# Patient Record
Sex: MALE | Race: WHITE | Employment: OTHER | URBAN - METROPOLITAN AREA
[De-identification: names, ages, dates, MRNs, and addresses within clinical notes are randomized per-mention and may not be internally consistent; named-entity substitution may affect disease eponyms.]

---

## 2017-01-02 ENCOUNTER — TELEPHONE (OUTPATIENT)
Dept: FAMILY MEDICINE | Facility: CLINIC | Age: 57
End: 2017-01-02

## 2017-01-03 ENCOUNTER — TELEPHONE (OUTPATIENT)
Dept: SURGERY | Facility: CLINIC | Age: 57
End: 2017-01-03

## 2017-01-03 NOTE — TELEPHONE ENCOUNTER
GENERAL SURGERY NURSE PHONE TRIAGE     Yogi Moreland      MRN# [id number]  AGE:  56 year old     YOB: 1960  [phone]     Surgeon: Dr. Masters      Surgery type: Laparoscopic-Assisted Colostomy Takedown, repair of parastomal hernia       Surgery Date: December / 27 / 2016     POD: 7     CHIEF CONCERN:  Constipation and incision     HISTORY OF PRESENT ILLNESS:      CONSTIPATION  Last bowel movement: 1 day ago.  Stool consistency: soft formed-   Patient feels his output is far less than input.  Accompanying Signs & Symptoms:  Abdominal pain (cramping?): no   Blood in stool: no   Rectal pain: no   Nausea/vomiting: no   Weight loss or gain: no  History:   History of abdominal surgery: YES    INCISION  Patient reports superior incision- where stoma stump was, is experiencing new drainage that is dark red. Slightly pink area where drainage has been.  Onset- 2 days ago.  Patient reports, area scabs over at times. Staples are present yet loose (per pt)  PLAN:   Take Miralax  clinic appointment with provider tomorrow 1/4/17 at 10:30 am to assess incision.   Pt is in agreement with this plan.  Joanne Lai RN

## 2017-01-03 NOTE — TELEPHONE ENCOUNTER
"Hospital/TCU/ED for chronic condition Discharge Protocol    \"Hi, my name is Efe Coto, a registered nurse, and I am calling from [facility].  I am calling to follow up and see how things are going for you after your recent /hospital stay.\"    Tell me how you are doing now that you are home?\" Pretty good. I had a little seepage from incision site, the site of colostomy/stoma takedown. Called surgeon who recommended follow up visit tomorrow 1/4/17 for incision check.  And is also seeing Dr. Masters again Monday Jan 9 for staple removal      Discharge Instructions    \"Let's review your discharge instructions.  What is/are the follow-up recommendations?  Pt. Response: see above    \"Has an appointment with your primary care provider been scheduled?\"   No. Declined at this time.     \"When you see the provider, I would recommend that you bring your medications with you.\"    Medications    \"Tell me what changed about your medicines when you discharged?\"    Changes to chronic meds?    0-1    \"What questions do you have about your medications?\"    None     New diagnoses of heart failure, COPD, diabetes, or MI?    No              Medication reconciliation completed? Yes  Was MTM referral placed (*Make sure to put transitions as reason for referral)?   No    Call Summary    \"What questions or concerns do you have about your recent visit and your follow-up care?\"     none    \"If you have questions or things don't continue to improve, we encourage you contact us.  Even if the clinic is not open, triage nurses are available 24/7 to help you.     \"Thank you for your time and take care!\"             "

## 2017-01-04 ENCOUNTER — OFFICE VISIT (OUTPATIENT)
Dept: SURGERY | Facility: CLINIC | Age: 57
End: 2017-01-04
Payer: COMMERCIAL

## 2017-01-04 VITALS
WEIGHT: 165 LBS | SYSTOLIC BLOOD PRESSURE: 110 MMHG | BODY MASS INDEX: 22.35 KG/M2 | HEART RATE: 77 BPM | OXYGEN SATURATION: 100 % | HEIGHT: 72 IN | DIASTOLIC BLOOD PRESSURE: 74 MMHG | TEMPERATURE: 98 F

## 2017-01-04 DIAGNOSIS — Z09 SURGICAL FOLLOWUP VISIT: Primary | ICD-10-CM

## 2017-01-04 PROCEDURE — 99024 POSTOP FOLLOW-UP VISIT: CPT | Performed by: PHYSICIAN ASSISTANT

## 2017-01-04 NOTE — PROGRESS NOTES
1/4/2017  [facility] Clinic Note     Subjective:  Yogi Moreland is here for an incision check. He underwent a laparoscopic-assisted colostomy takedown and repair of parastomal hernia by Dr. Masters on 12/26/2016. Today he  tells me he has been feeling well since surgery although on Monday he feels he may have overdone it with activity as he reports he attempted to hang some shelves at home and later that day he noticed some serosanginous drainage from his old stoma site. He denies increased pain or signs/symptoms of infections and is afebrile. He also reports he was feeling a bit constipated and began to take miralax, which has been successful. He currently does not require narcotic pain medications and is managing his pain with Tylenol and ibuprofen alone.     Objective:  Incisions - Healing well, well approximated, without signs of infection and no current drainage. Staples intact.  Old stoma site with small scab between staples, without drainage or signs of infection.     Assessment:  S/p laparoscopic-assisted colostomy takedown and repair of parastomal hernia by Dr. Masters on 12/26/2016. .   Likely small seroma began draining from stoma site.       Plan:  RTC next week as scheduled for postoperative visit and staple removal, or sooner if needed. Patient is in agreement with this plan.       Melba Crawford PA-C      Please route or send letter to:  *None*

## 2017-01-09 ENCOUNTER — OFFICE VISIT (OUTPATIENT)
Dept: SURGERY | Facility: CLINIC | Age: 57
End: 2017-01-09
Payer: COMMERCIAL

## 2017-01-09 VITALS
HEART RATE: 94 BPM | OXYGEN SATURATION: 100 % | TEMPERATURE: 98 F | DIASTOLIC BLOOD PRESSURE: 64 MMHG | BODY MASS INDEX: 22.62 KG/M2 | WEIGHT: 167 LBS | SYSTOLIC BLOOD PRESSURE: 122 MMHG | HEIGHT: 72 IN

## 2017-01-09 DIAGNOSIS — Z09 SURGICAL FOLLOWUP VISIT: Primary | ICD-10-CM

## 2017-01-09 PROCEDURE — 99024 POSTOP FOLLOW-UP VISIT: CPT | Performed by: SURGERY

## 2017-01-09 NOTE — PROGRESS NOTES
The patient returns to follow-up after his colostomy takedown and repair of parastomal hernia.  He is doing very well.  He did have some drainage from his incision, but this has stopped.  He presents today to have his staples removed.    The patient's incisions are healing well. There is a small scab in the midportion of his stoma site incision.  The staples are removed and Steri-Strips are placed.    Overall, the patient is doing well.  He may return to see me on an as-needed basis.  He should definitely return if he has any further significant drainage from his incision.      Ramón Masters MD  [facility]    Please route or send letter to:  Primary Care Provider (PCP)      HPI      ROS      Physical Exam

## 2017-01-09 NOTE — Clinical Note
2017      RE:  Yogi ORDOÑEZ Aniceto-: 56    The patient returns to follow-up after his colostomy takedown and repair of parastomal hernia.  He is doing very well.  He did have some drainage from his incision, but this has stopped.  He presents today to have his staples removed.    The patient's incisions are healing well. There is a small scab in the midportion of his stoma site incision.  The staples are removed and Steri-Strips are placed.    Overall, the patient is doing well.  He may return to see me on an as-needed basis.  He should definitely return if he has any further significant drainage from his incision.      Ramón Masters MD  [facility]

## 2017-01-23 DIAGNOSIS — R97.20 ELEVATED PROSTATE SPECIFIC ANTIGEN (PSA): ICD-10-CM

## 2017-01-23 PROCEDURE — 36415 COLL VENOUS BLD VENIPUNCTURE: CPT | Performed by: FAMILY MEDICINE

## 2017-01-23 PROCEDURE — 84153 ASSAY OF PSA TOTAL: CPT | Performed by: FAMILY MEDICINE

## 2017-01-24 LAB — PSA SERPL-MCNC: 7.61 UG/L (ref 0–4)

## 2017-01-26 ENCOUNTER — OFFICE VISIT (OUTPATIENT)
Dept: UROLOGY | Facility: CLINIC | Age: 57
End: 2017-01-26
Payer: COMMERCIAL

## 2017-01-26 ENCOUNTER — OFFICE VISIT (OUTPATIENT)
Dept: SURGERY | Facility: CLINIC | Age: 57
End: 2017-01-26
Payer: COMMERCIAL

## 2017-01-26 VITALS
WEIGHT: 167 LBS | HEIGHT: 72 IN | SYSTOLIC BLOOD PRESSURE: 120 MMHG | HEART RATE: 78 BPM | DIASTOLIC BLOOD PRESSURE: 60 MMHG | BODY MASS INDEX: 22.62 KG/M2

## 2017-01-26 DIAGNOSIS — R97.20 ELEVATED PROSTATE SPECIFIC ANTIGEN (PSA): Primary | ICD-10-CM

## 2017-01-26 DIAGNOSIS — Z09 SURGICAL FOLLOWUP VISIT: Primary | ICD-10-CM

## 2017-01-26 PROCEDURE — 99212 OFFICE O/P EST SF 10 MIN: CPT | Performed by: UROLOGY

## 2017-01-26 PROCEDURE — 99207 ZZC NO CHARGE NURSE ONLY: CPT | Performed by: SURGERY

## 2017-01-26 RX ORDER — CIPROFLOXACIN 500 MG/1
500 TABLET, FILM COATED ORAL EVERY 12 HOURS
Qty: 6 TABLET | Refills: 0 | Status: SHIPPED | OUTPATIENT
Start: 2017-01-26 | End: 2017-03-07

## 2017-01-26 NOTE — PROGRESS NOTES
Yogi is a 56-year-old male with an elevated PSA.  He recently underwent colostomy takedown.  His repeat PSA preop was 7.61.  He has no first-degree relative with prostate cancer.  He had a normal digital rectal exam 5 weeks ago.  Other past medical history:inflammatory diverticular disease, hyperlipidemia, nonsmoker  Medications:oxycodone  Allergies: None  Review of systems: Healing well from surgery a month ago.  Has some blood in stool and will check Dr. Masters  Assessment: Elevated PSA  Plan: Transrectal ultrasound of prostate with likely transrectal biopsies of the prostate  In 3 weeks.  We will prophylax with Cipro 500 mg plus  Gentamicin 80 mg IM because he has taken quinolones recently-discussed 20% risk of quinolone resistant Escherichia coli, other risks, follow-up

## 2017-01-26 NOTE — MR AVS SNAPSHOT
After Visit Summary   1/26/2017    Yogi Moreland    MRN: [id number]           Patient Information     Date Of Birth          1960        Visit Information        Provider Department      1/26/2017 10:30 AM Chuy Reddy MD [facility] [place]        Today's Diagnoses     Elevated prostate specific antigen (PSA)    -  1       Care Instructions    Ultrasound Guided Prostate Biopsy      What is a prostate biopsy?  A prostate biopsy is a relatively painless procedure performed in the physician's office, outpatient facility or hospital.  A long, thin needle is inserted into the prostate to collect a sample of tissue from the prostate.  These samples are then examined by a pathologist for abnormal cells.    Why do I need a prostate biopsy? During a man's lifetime the prostate gradually increases in size.  The patient may or may not experience symptoms.  Symptoms of an enlarge prostate include:    Increased frequency of urination    Decreased force of urinary stream    Trouble with urination    Awakening at night to urinate    When the prostate is examined, the physician may feel a nodule (hard or firm growth) which would require a biopsy.    Another reason for having a prostate biopsy is an increase in the prostate specific androgen (PSA) in your blood.  A prostate biopsy may be necessary to determine the cause of the increased PSA.    Your physician will also perform an ultrasound (an image created by sound waves).  The ultrasound is performed by placing a small probe in the rectum to image the prosate gland.    Preparation for the Prostate Biopsy    1.  During the week before your prostate biopsy substances that may thin your blood (ASPIRIN, BABY ASPIRIN, ACETYLSALICYLIC ACID, ADVIL/MOTRIN, IBUPROFEN, ALEVE, COUMADIN (WARFARIN), PRADAXA, ELIQUIS, PLAVIX, XARELTO, VITAMIN E, FISH OIL) must be discontinued.  If you are in doubt, please call.  This is a very  important requirement and your procedure may be cancelled if you have not stopped taking all of these medications.    2.  If you have any bleeding problems (thin blood), please tell your doctor.    3.  If you have been told by another doctor to take antibiotics before dental work or surgery, please tell the doctor.  This is common for patients that have had a joint replacement.    YOU HAVE BEEN PRESCRIBED AN ANTIBIOTIC.  Please follow the directions written on the bottle.  The directions usually will tell you to start the antibiotic the day before your biopsy.  You will continue taking the medication until it is gone.    The day of the biopsy you will be asked to use an enema one hour before you leave for your appointment.    Unless you have been prescribed a sedative (Valium or a similar drug) you will be able to drive to and from your appointment.  If you have taken a sedative you must have a ride.    AFTER THE BIOPSY    DANGER SIGNS    Small amount of blood in the urine for 10-14 days Excessive blood in the urine, stool or ejaculate  Small amount of blood in the stool for 48 hours Fever over 100 or chills  Small amount of blood in the ejaculate for up to Frequent urination or burning when you urinate   4 weeks          Your biopsy is scheduled on____________________________ at our [place] office.  Please be at     The office at ____________________.  A follow up visit has been scheduled for you on     _______________________________ at the  ________________________________.    Your doctor will discuss your results with you at this visit.    CALL THE DOCTOR'S OFFICE -[phone] IF YOU HAVE ANY OF THESE SYMPTOMS.  IF YOU CANNOT CONTACT THE OFFICE, GO TO THE EMERGENCY ROOM.    _             Follow-ups after your visit        Your next 10 appointments already scheduled     Feb 17, 2017  2:00 PM   Sonography/Biopsy with Chuy Reddy MD,  BX ROOM   [facility] [place] ([facility] [place])    [address]  [address]  [facility] [id number] [phone]            Mar 07, 2017 10:10 AM   Return Visit with Chuy Reddy MD   [facility] [place] ([facility] [place])    [address]  [address]  [place] [address] [phone]              Who to contact     If you have questions or need follow up information about today's clinic visit or your schedule please contact [facility] [place] directly at [phone].  Normal or non-critical lab and imaging results will be communicated to you by [employer], letter or phone within 4 business days after the clinic has received the results. If you do not hear from us within 7 days, please contact the clinic through [employer] or phone. If you have a critical or abnormal lab result, we will notify you by phone as soon as possible.  Submit refill requests through [employer] or call your pharmacy and they will forward the refill request to us. Please allow 3 business days for your refill to be completed.          Additional Information About Your Visit        MyChart Information     [employer] gives you secure access to your electronic health record. If you see a primary care provider, you can also send messages to your care team and make appointments. If you have questions, please call your primary care clinic.  If you do not have a primary care provider, please call [phone] and they will assist you.        Care EveryWhere ID     This is your Care EveryWhere ID. This could be used by other organizations to access your [place] medical records  [id number]        Your Vitals Were     Pulse Height BMI (Body Mass Index)             78 1.829 m (6') 22.64 kg/m2          Blood Pressure from Last 3 Encounters:   01/26/17 120/60   01/09/17 122/64   01/04/17 110/74    Weight from Last 3 Encounters:   01/26/17 75.751 kg (167 lb)   01/09/17 75.751 kg (167 lb)    01/04/17 74.844 kg (165 lb)              Today, you had the following     No orders found for display         Today's Medication Changes          These changes are accurate as of: 1/26/17 10:41 AM.  If you have any questions, ask your nurse or doctor.               Start taking these medicines.        Dose/Directions    ciprofloxacin 500 MG tablet   Commonly known as:  CIPRO   Used for:  Elevated prostate specific antigen (PSA)   Started by:  Chuy Reddy MD        Dose:  500 mg   Take 1 tablet (500 mg) by mouth every 12 hours   Quantity:  6 tablet   Refills:  0            Where to get your medicines      These medications were sent to [place] [address] [place] [facility] [address]  [address] [facility] [address]     Phone:  [phone]    - ciprofloxacin 500 MG tablet             Primary Care Provider Office Phone # Fax #    Randolph Solomon Mueller [phone]       [facility] [address] [facility] [address]        Thank you!     Thank you for choosing [facility] [place]  for your care. Our goal is always to provide you with excellent care. Hearing back from our patients is one way we can continue to improve our services. Please take a few minutes to complete the written survey that you may receive in the mail after your visit with us. Thank you!             Your Updated Medication List - Protect others around you: Learn how to safely use, store and throw away your medicines at www.disposemymeds.org.          This list is accurate as of: 1/26/17 10:41 AM.  Always use your most recent med list.                   Brand Name Dispense Instructions for use    ciprofloxacin 500 MG tablet    CIPRO    6 tablet    Take 1 tablet (500 mg) by mouth every 12 hours       oxyCODONE 5 MG IR tablet    ROXICODONE    50 tablet    Take 1-2 tablets (5-10 mg) by mouth every 4 hours as needed for moderate to severe pain

## 2017-01-26 NOTE — PATIENT INSTRUCTIONS
Ultrasound Guided Prostate Biopsy      What is a prostate biopsy?  A prostate biopsy is a relatively painless procedure performed in the physician's office, outpatient facility or hospital.  A long, thin needle is inserted into the prostate to collect a sample of tissue from the prostate.  These samples are then examined by a pathologist for abnormal cells.    Why do I need a prostate biopsy? During a man's lifetime the prostate gradually increases in size.  The patient may or may not experience symptoms.  Symptoms of an enlarge prostate include:    Increased frequency of urination    Decreased force of urinary stream    Trouble with urination    Awakening at night to urinate    When the prostate is examined, the physician may feel a nodule (hard or firm growth) which would require a biopsy.    Another reason for having a prostate biopsy is an increase in the prostate specific androgen (PSA) in your blood.  A prostate biopsy may be necessary to determine the cause of the increased PSA.    Your physician will also perform an ultrasound (an image created by sound waves).  The ultrasound is performed by placing a small probe in the rectum to image the prosate gland.    Preparation for the Prostate Biopsy    1.  During the week before your prostate biopsy substances that may thin your blood (ASPIRIN, BABY ASPIRIN, ACETYLSALICYLIC ACID, ADVIL/MOTRIN, IBUPROFEN, ALEVE, COUMADIN (WARFARIN), PRADAXA, ELIQUIS, PLAVIX, XARELTO, VITAMIN E, FISH OIL) must be discontinued.  If you are in doubt, please call.  This is a very important requirement and your procedure may be cancelled if you have not stopped taking all of these medications.    2.  If you have any bleeding problems (thin blood), please tell your doctor.    3.  If you have been told by another doctor to take antibiotics before dental work or surgery, please tell the doctor.  This is common for patients that have had a joint replacement.    YOU HAVE BEEN PRESCRIBED AN  ANTIBIOTIC.  Please follow the directions written on the bottle.  The directions usually will tell you to start the antibiotic the day before your biopsy.  You will continue taking the medication until it is gone.    The day of the biopsy you will be asked to use an enema one hour before you leave for your appointment.    Unless you have been prescribed a sedative (Valium or a similar drug) you will be able to drive to and from your appointment.  If you have taken a sedative you must have a ride.    AFTER THE BIOPSY    DANGER SIGNS    Small amount of blood in the urine for 10-14 days Excessive blood in the urine, stool or ejaculate  Small amount of blood in the stool for 48 hours Fever over 100 or chills  Small amount of blood in the ejaculate for up to Frequent urination or burning when you urinate   4 weeks          Your biopsy is scheduled on____________________________ at our [place] office.  Please be at     The office at ____________________.  A follow up visit has been scheduled for you on     _______________________________ at the  ________________________________.    Your doctor will discuss your results with you at this visit.    CALL THE DOCTOR'S OFFICE -[phone] IF YOU HAVE ANY OF THESE SYMPTOMS.  IF YOU CANNOT CONTACT THE OFFICE, GO TO THE EMERGENCY ROOM.    _

## 2017-01-26 NOTE — NURSING NOTE
Unable to find Gentamicin 80mg vial of medicine on Epic so RX was cllled into his pharmacy. Yogi was instructed to  the vial of medicine and and bring to his appointment for the biopsy and nurse at office will give 30 minutes prior to biopsy appointment per Dr Reddy. Suri Santiago LPN

## 2017-01-26 NOTE — Clinical Note
1/26/2017       RE: Yogi Moreland  [address] [facility] [address]     Dear Colleague,    Thank you for referring your patient, Yogi Moreland, to the [facility] [place] [facility]. Please see a copy of my visit note below.    Yogi is a 56-year-old male with an elevated PSA.  He recently underwent colostomy takedown.  His repeat PSA preop was 7.61.  He has no first-degree relative with prostate cancer.  He had a normal digital rectal exam 5 weeks ago.  Other past medical history:inflammatory diverticular disease, hyperlipidemia, nonsmoker  Medications:oxycodone  Allergies: None  Review of systems: Healing well from surgery a month ago.  Has some blood in stool and will check Dr. Masters  Assessment: Elevated PSA  Plan: Transrectal ultrasound of prostate with likely transrectal biopsies of the prostate  In 3 weeks.  We will prophylax with Cipro 500 mg plus  Gentamicin 80 mg IM because he has taken quinolones recently-discussed 20% risk of quinolone resistant Escherichia coli, other risks, follow-up    Again, thank you for allowing me to participate in the care of your patient.      Sincerely,    Chuy Reddy MD

## 2017-01-26 NOTE — PROGRESS NOTES
GENERAL SURGERY NURSE PHONE TRIAGE     Yogi Moreland      MRN# [id number]  AGE:  56 year old     YOB: 1960  [phone] (home) [phone] (work) [place]     Surgeon: Dr. Masters      Surgery type: Laparoscopic-Assisted Colostomy Takedown, repair of parastomal hernia         Surgery Date: December / 27 / 2016     POD: 30     CHIEF CONCERN:  1. Discomfort  2.  Blood in stool     HISTORY OF PRESENT ILLNESS:   Patient walked into clinic and asked to see someone, had just been downstairs at urologist  And has a procedure scheduled for 2/17/17.  Told front dest he is having slight discomfort with B.Ms and blood in stools for the past 3-4 days.    Was in a nurse visit, patient approached desk stating he could not wait any longer.  Reports bleeding is bright red, small line on side of stool.      Appointment scheduled with Dr. Masters for tomorrow morning.  Will inform clinic PA-C (who was in an appointment when pt walked in)  Will also alert Dr. Masters.    PLAN:   Appointment scheduled with Dr. Masters for tomorrow morning.  Will inform clinic PA-C (who was in an appointment when pt walked in)  Will also alert Dr. Masters.   Pt is in agreement with this plan.

## 2017-01-27 ENCOUNTER — OFFICE VISIT (OUTPATIENT)
Dept: SURGERY | Facility: CLINIC | Age: 57
End: 2017-01-27
Payer: COMMERCIAL

## 2017-01-27 VITALS
TEMPERATURE: 97.7 F | HEART RATE: 70 BPM | SYSTOLIC BLOOD PRESSURE: 118 MMHG | OXYGEN SATURATION: 99 % | BODY MASS INDEX: 22.62 KG/M2 | HEIGHT: 72 IN | DIASTOLIC BLOOD PRESSURE: 62 MMHG | WEIGHT: 167 LBS

## 2017-01-27 DIAGNOSIS — Z09 SURGICAL FOLLOWUP VISIT: Primary | ICD-10-CM

## 2017-01-27 PROCEDURE — 99024 POSTOP FOLLOW-UP VISIT: CPT | Performed by: SURGERY

## 2017-01-27 NOTE — Clinical Note
2017      RE:  Yogi Arredondomelissa-:  56    The patient presents today with concerns about some recent blood in his stool.  He had several days of a small amount of blood on the surface of his stool.  This has now been resolved for two days.  He is due to have a prostate biopsy in about three weeks.    I explained to the patient that if he is not having active bleeding, this is likely either due to the healing staple line or possibly to hemorrhoids.  Given the fact that the bleeding has stopped, I would not recommend any intervention at this time.  If he has any continued bleeding, colonoscopy sometime after the six week postop.  May be appropriate.  I think it should be fine for him to go ahead with his prostate biopsy.  He will let me know if he has any further bleeding.    Ramón Masters MD  [facility]

## 2017-01-27 NOTE — PROGRESS NOTES
The patient presents today with concerns about some recent blood in his stool.  He had several days of a small amount of blood on the surface of his stool.  This has now been resolved for two days.  He is due to have a prostate biopsy in about three weeks.    I explained to the patient that if he is not having active bleeding, this is likely either due to the healing staple line or possibly to hemorrhoids.  Given the fact that the bleeding has stopped, I would not recommend any intervention at this time.  If he has any continued bleeding, colonoscopy sometime after the six week postop.  May be appropriate.  I think it should be fine for him to go ahead with his prostate biopsy.  He will let me know if he has any further bleeding.    Ramón Masters MD  [facility]    Please route or send letter to:  Primary Care Provider (PCP)      HPI      ROS      Physical Exam

## 2017-02-17 ENCOUNTER — OFFICE VISIT (OUTPATIENT)
Dept: UROLOGY | Facility: CLINIC | Age: 57
End: 2017-02-17
Payer: COMMERCIAL

## 2017-02-17 VITALS
WEIGHT: 167 LBS | HEIGHT: 72 IN | BODY MASS INDEX: 22.62 KG/M2 | SYSTOLIC BLOOD PRESSURE: 124 MMHG | DIASTOLIC BLOOD PRESSURE: 70 MMHG | HEART RATE: 80 BPM

## 2017-02-17 DIAGNOSIS — R97.20 ELEVATED PROSTATE SPECIFIC ANTIGEN (PSA): Primary | ICD-10-CM

## 2017-02-17 PROCEDURE — 76872 US TRANSRECTAL: CPT | Performed by: UROLOGY

## 2017-02-17 PROCEDURE — 55700 ZZHC BIOPSY PROSTATE NEEDLE/PUNCH: CPT | Performed by: UROLOGY

## 2017-02-17 PROCEDURE — 88342 IMHCHEM/IMCYTCHM 1ST ANTB: CPT | Mod: 59 | Performed by: UROLOGY

## 2017-02-17 PROCEDURE — 88305 TISSUE EXAM BY PATHOLOGIST: CPT | Mod: 59 | Performed by: UROLOGY

## 2017-02-17 PROCEDURE — 88344 IMHCHEM/IMCYTCHM EA MLT ANTB: CPT | Performed by: UROLOGY

## 2017-02-17 RX ORDER — GENTAMICIN 40 MG/ML
INJECTION, SOLUTION INTRAMUSCULAR; INTRAVENOUS
COMMUNITY
Start: 2017-01-26 | End: 2017-03-07

## 2017-02-17 ASSESSMENT — PAIN SCALES - GENERAL: PAINLEVEL: NO PAIN (0)

## 2017-02-17 NOTE — PROGRESS NOTES
Diagnosis: PSA 7.61  Procedure: ALCIDES, transrectal ultrasound of prostate, transrectal biopsies of prostate  Anesthesia: Local  EBL: 5 cc  Complications: None. Patient tolerated procedure well  Findings: Prostate is 62 cc in size, left transition zone is much larger than right transition zone. No hypoechoic areas seen in the peripheral zone. Total of 15 biopsies taken with 3 from the left transition zone  Prophylaxis: Cipro 500 mg ×6 doses plus gentamicin IM

## 2017-02-17 NOTE — PATIENT INSTRUCTIONS
[facility]  Transrectal Ultrasound  Post Operative Information    The physician who performed your Transrectal Ultrasound is Dr. Reddy (telephone number [phone]).  Please contact this doctor if you have any problems or questions.  If unable to reach your doctor, please return to the Emergency Department.      Take one antibiotic the evening of the procedure and then as directed on your prescription.    Drink at least 6-8 glasses of fluids for the first 48 hours.    Avoid heavy lifting and strenuous activity for 48 hours.    Avoid sexual intercourse for the first 24 hours.    No aspirin or ibuprofen products (Motrin, Advil, Nuprin, ect.) for one week.  You may take acetaminophen (Tylenol) for pain.    You may notice a small amount of blood on the tissue after a bowel movement.    You may pass blood with clots in your urine following the procedure.  The amount will decrease with time but may be visible for up to two weeks.     You make have blood in your semen for 4 weeks after the procedure.    You may experience mild perineal (groin area) discomfort after the procedure.    Please call you doctor if you have any of the follow symptoms:  Fever  Increase in the amount of blood passed  Severe discomfort or pain

## 2017-02-17 NOTE — MR AVS SNAPSHOT
After Visit Summary   2/17/2017    Yogi Moreland    MRN: [id number]           Patient Information     Date Of Birth          1960        Visit Information        Provider Department      2/17/2017 2:00 PM Chuy Reddy MD; UA BX ROOM [facility] [place]        Today's Diagnoses     Elevated prostate specific antigen (PSA)    -  1      Care Instructions    [facility]  Transrectal Ultrasound  Post Operative Information    The physician who performed your Transrectal Ultrasound is Dr. Reddy (telephone number [phone]).  Please contact this doctor if you have any problems or questions.  If unable to reach your doctor, please return to the Emergency Department.      Take one antibiotic the evening of the procedure and then as directed on your prescription.    Drink at least 6-8 glasses of fluids for the first 48 hours.    Avoid heavy lifting and strenuous activity for 48 hours.    Avoid sexual intercourse for the first 24 hours.    No aspirin or ibuprofen products (Motrin, Advil, Nuprin, ect.) for one week.  You may take acetaminophen (Tylenol) for pain.    You may notice a small amount of blood on the tissue after a bowel movement.    You may pass blood with clots in your urine following the procedure.  The amount will decrease with time but may be visible for up to two weeks.     You make have blood in your semen for 4 weeks after the procedure.    You may experience mild perineal (groin area) discomfort after the procedure.    Please call you doctor if you have any of the follow symptoms:  Fever  Increase in the amount of blood passed  Severe discomfort or pain        Follow-ups after your visit        Your next 10 appointments already scheduled     Mar 07, 2017 10:10 AM CST   Return Visit with Chuy Reddy MD   [facility] [place] ([facility])    [address]  [facility] [id number] [phone]              Future tests that were ordered for you today     Open Future Orders        Priority Expected Expires Ordered    US TRANSRECTAL Routine 5/18/2017 2/17/2018 2/17/2017            Who to contact     If you have questions or need follow up information about today's clinic visit or your schedule please contact [facility] [place] directly at [phone].  Normal or non-critical lab and imaging results will be communicated to you by [employer], letter or phone within 4 business days after the clinic has received the results. If you do not hear from us within 7 days, please contact the clinic through [employer] or phone. If you have a critical or abnormal lab result, we will notify you by phone as soon as possible.  Submit refill requests through [employer] or call your pharmacy and they will forward the refill request to us. Please allow 3 business days for your refill to be completed.          Additional Information About Your Visit        [employer] Information     [employer] gives you secure access to your electronic health record. If you see a primary care provider, you can also send messages to your care team and make appointments. If you have questions, please call your primary care clinic.  If you do not have a primary care provider, please call [phone] and they will assist you.        Care EveryWhere ID     This is your Care EveryWhere ID. This could be used by other organizations to access your [place] medical records  [id number]        Your Vitals Were     Pulse Height BMI (Body Mass Index)             80 1.829 m (6') 22.65 kg/m2          Blood Pressure from Last 3 Encounters:   02/17/17 124/70   01/27/17 118/62   01/26/17 120/60    Weight from Last 3 Encounters:   02/17/17 75.8 kg (167 lb)   01/27/17 75.8 kg (167 lb)   01/26/17 75.8 kg (167 lb)              We Performed the Following     BIOPSY OF PROSTATE,NEEDLE/PUNCH [[id number]]      INJECT ASHA FELTON PERIPH NERV [[id number]]        Primary Care Provider Office Phone # Fax #    Randolph Solomon Mueller [phone]       [address] [address]        Thank you!     Thank you for choosing [facility] [place]  for your care. Our goal is always to provide you with excellent care. Hearing back from our patients is one way we can continue to improve our services. Please take a few minutes to complete the written survey that you may receive in the mail after your visit with us. Thank you!             Your Updated Medication List - Protect others around you: Learn how to safely use, store and throw away your medicines at www.disposemymeds.org.          This list is accurate as of: 2/17/17  2:53 PM.  Always use your most recent med list.                   Brand Name Dispense Instructions for use    ciprofloxacin 500 MG tablet    CIPRO    6 tablet    Take 1 tablet (500 mg) by mouth every 12 hours       gentamicin 40 MG/ML injection    GARAMYCIN         oxyCODONE 5 MG IR tablet    ROXICODONE    50 tablet    Take 1-2 tablets (5-10 mg) by mouth every 4 hours as needed for moderate to severe pain

## 2017-02-17 NOTE — LETTER
2/17/2017       RE: Yogi Moreland  [address]   [place] [place] [facility] [address]     Dear Colleague,    Thank you for referring your patient, Yogi Moreland, to the [facility]OLOG[facility] [place] at [facility]. Please see a copy of my visit note below.    Diagnosis: PSA 7.61  Procedure: ALCIDES, transrectal ultrasound of prostate, transrectal biopsies of prostate  Anesthesia: Local  EBL: 5 cc  Complications: None. Patient tolerated procedure well  Findings: Prostate is 62 cc in size, left transition zone is much larger than right transition zone. No hypoechoic areas seen in the peripheral zone. Total of 15 biopsies taken with 3 from the left transition zone  Prophylaxis: Cipro 500 mg ×6 doses plus gentamicin IM    Again, thank you for allowing me to participate in the care of your patient.      Sincerely,    Chuy Reddy MD

## 2017-02-17 NOTE — NURSING NOTE
Chief Complaint   Patient presents with     Prostate Biopsy     Prior to the start of the procedure and with procedural staff participation, I verbally confirmed the patient s identity using two indicators, relevant allergies, that the procedure was appropriate and matched the consent or emergent situation, and that the correct equipment/implants were available. Immediately prior to starting the procedure I conducted the Time Out with the procedural staff and re-confirmed the patient s name, procedure, and site/side. (The Joint Commission universal protocol was followed.)  Yes    Sedation (Moderate or Deep): None  Angie Ham LPN

## 2017-02-22 LAB — COPATH REPORT: NORMAL

## 2017-03-07 ENCOUNTER — OFFICE VISIT (OUTPATIENT)
Dept: UROLOGY | Facility: CLINIC | Age: 57
End: 2017-03-07
Payer: COMMERCIAL

## 2017-03-07 VITALS
HEART RATE: 80 BPM | BODY MASS INDEX: 21.94 KG/M2 | HEIGHT: 72 IN | WEIGHT: 162 LBS | DIASTOLIC BLOOD PRESSURE: 70 MMHG | SYSTOLIC BLOOD PRESSURE: 120 MMHG

## 2017-03-07 DIAGNOSIS — C61 MALIGNANT NEOPLASM OF PROSTATE (H): Primary | ICD-10-CM

## 2017-03-07 PROCEDURE — 99215 OFFICE O/P EST HI 40 MIN: CPT | Performed by: UROLOGY

## 2017-03-07 NOTE — PROGRESS NOTES
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 His clinical stage is T1c. PSA is 7.46. Prostate volume 60 cc.  The patient underwent sigmoid resection and colostomy through a lower midline incision last year. His colostomy was taken down December 27 of 2016. He has done very well since.  Other past medical history: Hyperlipidemia, nonsmoker  Medications: None  Allergies: None  Assessment: Grade 3+4 adenocarcinoma of the prostate. Discussed all options, risks, follow-up, recommendation for bilateral pelvic lymph node dissection and radical retropubic prostatectomy. Patient should see Phillip Denson M.D. for further discussion of open versus robotic-assisted surgery.  All questions answered  Total time: 45 minutes, 100% counseling. Abdifatah tables reviewed

## 2017-03-07 NOTE — LETTER
3/7/2017       RE: Yogi Moreland  [address] [facility] [address]     Dear Colleague,    Thank you for referring your patient, Yogi Moreland, to the [facility]OLOG[facility] [place] at [facility]. Please see a copy of my visit note below.    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 His clinical stage is T1c. PSA is 7.46. Prostate volume 60 cc.  The patient underwent sigmoid resection and colostomy through a lower midline incision last year. His colostomy was taken down December 27 of 2016. He has done very well since.  Other past medical history: Hyperlipidemia, nonsmoker  Medications: None  Allergies: None  Assessment: Grade 3+4 adenocarcinoma of the prostate. Discussed all options, risks, follow-up, recommendation for bilateral pelvic lymph node dissection and radical retropubic prostatectomy. Patient should see Phillip Denson M.D. for further discussion of open versus robotic-assisted surgery.  All questions answered  Total time: 45 minutes, 100% counseling. Abdifatah tables reviewed    Again, thank you for allowing me to participate in the care of your patient.      Sincerely,    Chuy Reddy MD

## 2017-03-07 NOTE — MR AVS SNAPSHOT
After Visit Summary   3/7/2017    Yogi Moreland    MRN: [id number]           Patient Information     Date Of Birth          1960        Visit Information        Provider Department      3/7/2017 10:10 AM Chuy Reddy MD [facility]        Today's Diagnoses     Malignant neoplasm of prostate (H)    -  1       Follow-ups after your visit        Your next 10 appointments already scheduled     Mar 21, 2017  3:40 PM CDT   Return Prostate Cancer with Keith Denson MD   [facility] ([facility])    [address]  [address]  [facility] [address] [phone]              Who to contact     If you have questions or need follow up information about today's clinic visit or your schedule please contact [facility] directly at [phone].  Normal or non-critical lab and imaging results will be communicated to you by MyChart, letter or phone within 4 business days after the clinic has received the results. If you do not hear from us within 7 days, please contact the clinic through [employer] or phone. If you have a critical or abnormal lab result, we will notify you by phone as soon as possible.  Submit refill requests through [employer] or call your pharmacy and they will forward the refill request to us. Please allow 3 business days for your refill to be completed.          Additional Information About Your Visit        MyChart Information     [employer] gives you secure access to your electronic health record. If you see a primary care provider, you can also send messages to your care team and make appointments. If you have questions, please call your primary care clinic.  If you do not have a primary care provider, please call [phone] and they will assist you.        Care EveryWhere ID     This is your Care EveryWhere ID. This could be used by  other organizations to access your [place] medical records  [id number]        Your Vitals Were     Pulse Height BMI (Body Mass Index)             80 1.829 m (6') 21.97 kg/m2          Blood Pressure from Last 3 Encounters:   03/07/17 120/70   02/17/17 124/70   01/27/17 118/62    Weight from Last 3 Encounters:   03/07/17 73.5 kg (162 lb)   02/17/17 75.8 kg (167 lb)   01/27/17 75.8 kg (167 lb)              Today, you had the following     No orders found for display         Today's Medication Changes          These changes are accurate as of: 3/7/17 11:58 AM.  If you have any questions, ask your nurse or doctor.               Stop taking these medicines if you haven't already. Please contact your care team if you have questions.     ciprofloxacin 500 MG tablet   Commonly known as:  CIPRO   Stopped by:  Chuy Reddy MD           gentamicin 40 MG/ML injection   Commonly known as:  GARAMYCIN   Stopped by:  Chuy Reddy MD           oxyCODONE 5 MG IR tablet   Commonly known as:  ROXICODONE   Stopped by:  Chuy Reddy MD                    Primary Care Provider Office Phone # Fax #    Randolph Solomon Mueller [phone]       [address] [address]        Thank you!     Thank you for choosing [facility]OLOG[facility] [place]  for your care. Our goal is always to provide you with excellent care. Hearing back from our patients is one way we can continue to improve our services. Please take a few minutes to complete the written survey that you may receive in the mail after your visit with us. Thank you!             Your Updated Medication List - Protect others around you: Learn how to safely use, store and throw away your medicines at www.disposemymeds.org.      Notice  As of 3/7/2017 11:58 AM    You have not been prescribed any medications.

## 2017-03-21 ENCOUNTER — OFFICE VISIT (OUTPATIENT)
Dept: UROLOGY | Facility: CLINIC | Age: 57
End: 2017-03-21
Payer: COMMERCIAL

## 2017-03-21 VITALS
DIASTOLIC BLOOD PRESSURE: 66 MMHG | HEART RATE: 72 BPM | HEIGHT: 72 IN | SYSTOLIC BLOOD PRESSURE: 124 MMHG | BODY MASS INDEX: 21.94 KG/M2 | WEIGHT: 162 LBS

## 2017-03-21 DIAGNOSIS — C61 MALIGNANT NEOPLASM OF PROSTATE (H): Primary | ICD-10-CM

## 2017-03-21 PROCEDURE — 99214 OFFICE O/P EST MOD 30 MIN: CPT | Performed by: UROLOGY

## 2017-03-21 ASSESSMENT — PAIN SCALES - GENERAL: PAINLEVEL: NO PAIN (0)

## 2017-03-21 NOTE — LETTER
3/21/2017       RE: Yogi TORRES Aniceto  [address]   [place] [address]     Dear Colleague,    Thank you for referring your patient, Yogi Moreland, to the [facility] [place] at [facility]. Please see a copy of my visit note below.    Office Visit Note  [facility]  [phone]    UROLOGIC DIAGNOSES:   T1C Windy 3+4=7 prostate cancer    CURRENT INTERVENTIONS:       HISTORY:   This is a 56-year-old gentleman who recently saw Dr. Reddy because of an elevated PSA of 7.61. He underwent transrectal ultrasound biopsies of prostate and they revealed 1 core positive for Windy 3+4 = 7 prostate cancer at the left base. The other 11 cores were negative. He has been counseled on his treatment options and is interested in having surgery for his prostate cancer.    He has a complicated recent surgical history in that he had perforated diverticulitis in September for which he underwent an exploratory laparotomy, sigmoid resection, end colostomy. He underwent his colostomy takedown in December and has done well since that procedure.    He reports good erectile function. His prostate was measured at 62 cm .      PAST MEDICAL HISTORY:   Past Medical History   Diagnosis Date     Colon polyp      Elevated LFTs 8/2016     Elevated PSA      Enlarged prostate      Hyperlipidemia        PAST SURGICAL HISTORY:   Past Surgical History   Procedure Laterality Date     Colonoscopy  2009     Colonoscopy  2/2013     Laparotomy exploratory N/A 9/26/2016     Procedure: LAPAROTOMY EXPLORATORY;  Surgeon: Ramón Masters MD;  Location: [facility] OR     Colectomy with colostomy, combined N/A 9/26/2016     Procedure: COMBINED COLECTOMY WITH COLOSTOMY;  Surgeon: Ramón Masters MD;  Location: [facility] OR     Appendectomy open N/A 9/26/2016     Procedure: APPENDECTOMY OPEN;  Surgeon: Ramón Masters MD;  Location: [facility] OR     Laparoscopic assisted colostomy  "takedown N/A 12/27/2016     Procedure: LAPAROSCOPIC ASSISTED COLOSTOMY TAKEDOWN;  Surgeon: Ramón Masters MD;  Location: [facility] OR       FAMILY HISTORY:   Family History   Problem Relation Age of Onset     CANCER Mother      metastatic     CANCER Father      melanoma        SOCIAL HISTORY:   Social History   Substance Use Topics     Smoking status: Never Smoker     Smokeless tobacco: Never Used     Alcohol use 0.0 oz/week     0 Standard drinks or equivalent per week      Comment: 1 drink per day, binge drinks \"too drunk to drive\" once per month.       No current outpatient prescriptions on file.     No current facility-administered medications for this visit.          PHYSICAL EXAM:    Ht 1.829 m (6')  Wt 73.5 kg (162 lb)  BMI 21.97 kg/m2    HEENT: Normocephalic and atraumatic   Cardiac: Not done  Back/Flank: Not done  CNS/PNS: Not done  Respiratory: Normal non-labored breathing  Abdomen: Soft nontender and nondistended  Peripheral Vascular: Not done  Mental Status: Not done    Penis: Not done  Scrotal Skin: Not done  Testicles: Not done  Epididymis: Not done  Digital Rectal Exam:    Cystoscopy: Not done    Imaging: None    Urinalysis: UA RESULTS:  Recent Labs   Lab Test  12/30/16   1025  12/06/16   1137   COLOR  Yellow  Yellow   APPEARANCE  Slightly Cloudy  Clear   URINEGLC  Negative  Negative   URINEBILI  Negative  Negative   URINEKETONE  Negative  Negative   SG  1.010  1.020   UBLD  Large*  Trace*   URINEPH  6.0  6.0   PROTEIN  Negative  Negative   UROBILINOGEN   --   0.2   NITRITE  Negative  Negative   LEUKEST  Negative  Negative   RBCU  161*   --    WBCU  7*   --        PSA: 7.61    Post Void Residual:     Other labs: None today      IMPRESSION:  T1C Ripon 3+4 = 7 prostate cancer, enlarged prostate    PLAN:  We discussed his biopsy results in detail today. He had a small amount of Ripon grade 7 prostate cancer discovered in the left base of the prostate. We went over his treatment options in detail again " today including active surveillance, radical prostatectomy surgery, and radiotherapy. He is interested in having surgery for the prostate and I think this is a good option for him. We discussed open versus robotic prostatectomy. He has had recent complicated bowel surgeries on the sigmoid colon after a perforated diverticulitis. This would likely make a robotic prostatectomy difficult to impossible. This would also increase the risk of bowel injury with a robotic technique as well. We discussed this today. I recommended he proceed with an open radical retropubic prostatectomy and he agreed to the procedure. We discussed the procedure in detail today along with its risks and expected recovery. All of their questions were answered. He would like a nerve sparing procedure to be performed. He will perform Kegel exercises. We will get him scheduled for surgery sometime likely in the spring.     Total Time: 30 min                                      Total in Consultation: 30 min      Keith Denson M.D.

## 2017-03-21 NOTE — PROGRESS NOTES
Office Visit Note  [facility]  [phone]    UROLOGIC DIAGNOSES:   T1C Greeley 3+4=7 prostate cancer    CURRENT INTERVENTIONS:       HISTORY:   This is a 56-year-old gentleman who recently saw Dr. Reddy because of an elevated PSA of 7.61. He underwent transrectal ultrasound biopsies of prostate and they revealed 1 core positive for Greeley 3+4 = 7 prostate cancer at the left base. The other 11 cores were negative. He has been counseled on his treatment options and is interested in having surgery for his prostate cancer.    He has a complicated recent surgical history in that he had perforated diverticulitis in September for which he underwent an exploratory laparotomy, sigmoid resection, end colostomy. He underwent his colostomy takedown in December and has done well since that procedure.    He reports good erectile function. His prostate was measured at 62 cm .      PAST MEDICAL HISTORY:   Past Medical History   Diagnosis Date     Colon polyp      Elevated LFTs 8/2016     Elevated PSA      Enlarged prostate      Hyperlipidemia        PAST SURGICAL HISTORY:   Past Surgical History   Procedure Laterality Date     Colonoscopy  2009     Colonoscopy  2/2013     Laparotomy exploratory N/A 9/26/2016     Procedure: LAPAROTOMY EXPLORATORY;  Surgeon: Ramón Masters MD;  Location: [facility]     Colectomy with colostomy, combined N/A 9/26/2016     Procedure: COMBINED COLECTOMY WITH COLOSTOMY;  Surgeon: Ramón Masters MD;  Location: [facility] OR     Appendectomy open N/A 9/26/2016     Procedure: APPENDECTOMY OPEN;  Surgeon: Ramón Masters MD;  Location: [facility] OR     Laparoscopic assisted colostomy takedown N/A 12/27/2016     Procedure: LAPAROSCOPIC ASSISTED COLOSTOMY TAKEDOWN;  Surgeon: Ramón Masters MD;  Location: [facility] OR       FAMILY HISTORY:   Family History   Problem Relation Age of Onset     CANCER Mother      metastatic     CANCER Father      melanoma        SOCIAL HISTORY:   Social History   Substance  "Use Topics     Smoking status: Never Smoker     Smokeless tobacco: Never Used     Alcohol use 0.0 oz/week     0 Standard drinks or equivalent per week      Comment: 1 drink per day, binge drinks \"too drunk to drive\" once per month.       No current outpatient prescriptions on file.     No current facility-administered medications for this visit.          PHYSICAL EXAM:    Ht 1.829 m (6')  Wt 73.5 kg (162 lb)  BMI 21.97 kg/m2    HEENT: Normocephalic and atraumatic   Cardiac: Not done  Back/Flank: Not done  CNS/PNS: Not done  Respiratory: Normal non-labored breathing  Abdomen: Soft nontender and nondistended  Peripheral Vascular: Not done  Mental Status: Not done    Penis: Not done  Scrotal Skin: Not done  Testicles: Not done  Epididymis: Not done  Digital Rectal Exam:    Cystoscopy: Not done    Imaging: None    Urinalysis: UA RESULTS:  Recent Labs   Lab Test  12/30/16   1025  12/06/16   1137   COLOR  Yellow  Yellow   APPEARANCE  Slightly Cloudy  Clear   URINEGLC  Negative  Negative   URINEBILI  Negative  Negative   URINEKETONE  Negative  Negative   SG  1.010  1.020   UBLD  Large*  Trace*   URINEPH  6.0  6.0   PROTEIN  Negative  Negative   UROBILINOGEN   --   0.2   NITRITE  Negative  Negative   LEUKEST  Negative  Negative   RBCU  161*   --    WBCU  7*   --        PSA: 7.61    Post Void Residual:     Other labs: None today      IMPRESSION:  T1C Windy 3+4 = 7 prostate cancer, enlarged prostate    PLAN:  We discussed his biopsy results in detail today. He had a small amount of Mather grade 7 prostate cancer discovered in the left base of the prostate. We went over his treatment options in detail again today including active surveillance, radical prostatectomy surgery, and radiotherapy. He is interested in having surgery for the prostate and I think this is a good option for him. We discussed open versus robotic prostatectomy. He has had recent complicated bowel surgeries on the sigmoid colon after a perforated " diverticulitis. This would likely make a robotic prostatectomy difficult to impossible. This would also increase the risk of bowel injury with a robotic technique as well. We discussed this today. I recommended he proceed with an open radical retropubic prostatectomy and he agreed to the procedure. We discussed the procedure in detail today along with its risks and expected recovery. All of their questions were answered. He would like a nerve sparing procedure to be performed. He will perform Kegel exercises. We will get him scheduled for surgery sometime likely in the spring.     Total Time: 30 min                                      Total in Consultation: 30 min      Keith Denson M.D.

## 2017-03-21 NOTE — MR AVS SNAPSHOT
After Visit Summary   3/21/2017    Yogi Moreland    MRN: [id number]           Patient Information     Date Of Birth          1960        Visit Information        Provider Department      3/21/2017 3:40 PM Keith Denson MD [facility] [place]        Today's Diagnoses     Malignant neoplasm of prostate (H)    -  1       Follow-ups after your visit        Follow-up notes from your care team     Return for Schedule surgery.      Who to contact     If you have questions or need follow up information about today's clinic visit or your schedule please contact [facility] [place] directly at [phone].  Normal or non-critical lab and imaging results will be communicated to you by MyChart, letter or phone within 4 business days after the clinic has received the results. If you do not hear from us within 7 days, please contact the clinic through Ismolehart or phone. If you have a critical or abnormal lab result, we will notify you by phone as soon as possible.  Submit refill requests through Lil Monkey Butt or call your pharmacy and they will forward the refill request to us. Please allow 3 business days for your refill to be completed.          Additional Information About Your Visit        MyChart Information     Lil Monkey Butt gives you secure access to your electronic health record. If you see a primary care provider, you can also send messages to your care team and make appointments. If you have questions, please call your primary care clinic.  If you do not have a primary care provider, please call [phone] and they will assist you.        Care EveryWhere ID     This is your Care EveryWhere ID. This could be used by other organizations to access your [facility] medical records  [id number]        Your Vitals Were     Pulse Height BMI (Body Mass Index)             72 1.829 m (6') 21.97 kg/m2          Blood Pressure from Last 3 Encounters:    03/21/17 124/66   03/07/17 120/70   02/17/17 124/70    Weight from Last 3 Encounters:   03/21/17 73.5 kg (162 lb)   03/07/17 73.5 kg (162 lb)   02/17/17 75.8 kg (167 lb)              We Performed the Following     [place]ana-Operative Worksheet  (Urology General)        Primary Care Provider Office Phone # Fax #    Randolph Mueller [phone]       [address] [address]        Thank you!     Thank you for choosing [facility] [place]  for your care. Our goal is always to provide you with excellent care. Hearing back from our patients is one way we can continue to improve our services. Please take a few minutes to complete the written survey that you may receive in the mail after your visit with us. Thank you!             Your Updated Medication List - Protect others around you: Learn how to safely use, store and throw away your medicines at www.disposemymeds.org.      Notice  As of 3/21/2017  4:35 PM    You have not been prescribed any medications.

## 2017-03-24 ENCOUNTER — TELEPHONE (OUTPATIENT)
Dept: OTHER | Facility: CLINIC | Age: 57
End: 2017-03-24

## 2017-04-05 ENCOUNTER — OFFICE VISIT (OUTPATIENT)
Dept: UROLOGY | Facility: CLINIC | Age: 57
End: 2017-04-05
Payer: COMMERCIAL

## 2017-04-05 VITALS
BODY MASS INDEX: 23.3 KG/M2 | WEIGHT: 172 LBS | SYSTOLIC BLOOD PRESSURE: 132 MMHG | DIASTOLIC BLOOD PRESSURE: 72 MMHG | HEART RATE: 76 BPM | HEIGHT: 72 IN

## 2017-04-05 DIAGNOSIS — C61 PROSTATE CANCER (H): Primary | ICD-10-CM

## 2017-04-05 LAB
ALBUMIN UR-MCNC: NEGATIVE MG/DL
APPEARANCE UR: CLEAR
BILIRUB UR QL STRIP: NEGATIVE
COLOR UR AUTO: YELLOW
GLUCOSE UR STRIP-MCNC: NEGATIVE MG/DL
HGB UR QL STRIP: NEGATIVE
KETONES UR STRIP-MCNC: NEGATIVE MG/DL
LEUKOCYTE ESTERASE UR QL STRIP: NEGATIVE
NITRATE UR QL: NEGATIVE
PH UR STRIP: 7 PH (ref 5–7)
SP GR UR STRIP: 1.02 (ref 1–1.03)
URN SPEC COLLECT METH UR: NORMAL
UROBILINOGEN UR STRIP-ACNC: 1 EU/DL (ref 0.2–1)

## 2017-04-05 PROCEDURE — 96402 CHEMO HORMON ANTINEOPL SQ/IM: CPT | Performed by: UROLOGY

## 2017-04-05 PROCEDURE — 81003 URINALYSIS AUTO W/O SCOPE: CPT | Performed by: UROLOGY

## 2017-04-05 PROCEDURE — 99212 OFFICE O/P EST SF 10 MIN: CPT | Mod: 25 | Performed by: UROLOGY

## 2017-04-05 ASSESSMENT — PAIN SCALES - GENERAL: PAINLEVEL: NO PAIN (0)

## 2017-04-05 NOTE — MR AVS SNAPSHOT
After Visit Summary   4/5/2017    Yogi Moreland    MRN: [id number]           Patient Information     Date Of Birth          1960        Visit Information        Provider Department      4/5/2017 3:30 PM Chuy Reddy MD [facility] [place]        Today's Diagnoses     Prostate cancer (H)    -  1       Follow-ups after your visit        Who to contact     If you have questions or need follow up information about today's clinic visit or your schedule please contact [facility] [place] directly at [phone].  Normal or non-critical lab and imaging results will be communicated to you by Whittlhart, letter or phone within 4 business days after the clinic has received the results. If you do not hear from us within 7 days, please contact the clinic through Goods Platformt or phone. If you have a critical or abnormal lab result, we will notify you by phone as soon as possible.  Submit refill requests through PlantSense or call your pharmacy and they will forward the refill request to us. Please allow 3 business days for your refill to be completed.          Additional Information About Your Visit        MyChart Information     PlantSense gives you secure access to your electronic health record. If you see a primary care provider, you can also send messages to your care team and make appointments. If you have questions, please call your primary care clinic.  If you do not have a primary care provider, please call [phone] and they will assist you.        Care EveryWhere ID     This is your Care EveryWhere ID. This could be used by other organizations to access your [facility] medical records  [id number]        Your Vitals Were     Pulse Height BMI (Body Mass Index)             76 1.829 m (6') 23.33 kg/m2          Blood Pressure from Last 3 Encounters:   04/05/17 132/72   03/21/17 124/66   03/07/17 120/70    Weight from Last 3 Encounters:   04/05/17  78 kg (172 lb)   03/21/17 73.5 kg (162 lb)   03/07/17 73.5 kg (162 lb)              We Performed the Following     UA without Microscopic        Primary Care Provider Office Phone # Fax #    Randolph Mueller [phone]       [facility] [id number] [place] [id number]        Thank you!     Thank you for choosing [facility]OLOG[facility]  for your care. Our goal is always to provide you with excellent care. Hearing back from our patients is one way we can continue to improve our services. Please take a few minutes to complete the written survey that you may receive in the mail after your visit with us. Thank you!             Your Updated Medication List - Protect others around you: Learn how to safely use, store and throw away your medicines at www.disposemymeds.org.      Notice  As of 4/5/2017  3:58 PM    You have not been prescribed any medications.

## 2017-04-05 NOTE — PROGRESS NOTES
Yogi is a 56-year-old gentleman with grade 3+4 adenocarcinoma at the left prostate base medially, a PSA of 6.1, clinical stage TIc.  In December he underwent takedown of a colostomy and is doing well. He is here to discuss his surgery date and hormonal therapy.  Other past medical history: Inflammatory diverticular disease, hyperlipidemia, nonsmoker  Medications: None  Allergies: None  Exam: Normal appearance, alert and oriented, normocephalic  Assessment: Grade 3+4 adenocarcinoma of the prostate. He has elected to proceed with open radical retropubic prostatectomy and modified bilateral pelvic lymph node dissection in 3 months. He will have a bilateral nerve sparing procedure. The side effects of hormonal therapy for the next 3 months has been discussed  Plan: Eligard 22.5 mg subcutaneous today            Surgery scheduled for Tuesday, July 11 at [facility]

## 2017-04-05 NOTE — LETTER
4/5/2017       RE: Yogi Moreland  [address]   [place] [address]     Dear Colleague,    Thank you for referring your patient, Yogi Moreland, to the [facility] [place] at [facility]. Please see a copy of my visit note below.    Yogi is a 56-year-old gentleman with grade 3+4 adenocarcinoma at the left prostate base medially, a PSA of 6.1, clinical stage TIc.  In December he underwent takedown of a colostomy and is doing well. He is here to discuss his surgery date and hormonal therapy.  Other past medical history: Inflammatory diverticular disease, hyperlipidemia, nonsmoker  Medications: None  Allergies: None  Exam: Normal appearance, alert and oriented, normocephalic  Assessment: Grade 3+4 adenocarcinoma of the prostate. He has elected to proceed with open radical retropubic prostatectomy and modified bilateral pelvic lymph node dissection in 3 months. He will have a bilateral nerve sparing procedure. The side effects of hormonal therapy for the next 3 months has been discussed  Plan: Eligard 22.5 mg subcutaneous today            Surgery scheduled for Tuesday, July 11 at [facility]    Again, thank you for allowing me to participate in the care of your patient.      Sincerely,    Chuy Reddy MD

## 2017-04-06 DIAGNOSIS — C61 MALIGNANT NEOPLASM OF PROSTATE (H): Primary | ICD-10-CM

## 2017-04-07 DIAGNOSIS — C61 MALIGNANT NEOPLASM OF PROSTATE (H): Primary | ICD-10-CM

## 2017-04-11 DIAGNOSIS — C61 PROSTATE CANCER (H): ICD-10-CM

## 2017-04-11 DIAGNOSIS — C61 MALIGNANT NEOPLASM OF PROSTATE (H): Primary | ICD-10-CM

## 2017-06-14 ENCOUNTER — OFFICE VISIT (OUTPATIENT)
Dept: FAMILY MEDICINE | Facility: CLINIC | Age: 57
End: 2017-06-14
Payer: COMMERCIAL

## 2017-06-14 VITALS
HEART RATE: 68 BPM | WEIGHT: 170.7 LBS | SYSTOLIC BLOOD PRESSURE: 116 MMHG | OXYGEN SATURATION: 98 % | DIASTOLIC BLOOD PRESSURE: 73 MMHG | BODY MASS INDEX: 23.12 KG/M2 | HEIGHT: 72 IN | TEMPERATURE: 98.4 F

## 2017-06-14 DIAGNOSIS — Z01.818 PREOP GENERAL PHYSICAL EXAM: Primary | ICD-10-CM

## 2017-06-14 LAB
ERYTHROCYTE [DISTWIDTH] IN BLOOD BY AUTOMATED COUNT: 13.2 % (ref 10–15)
HCT VFR BLD AUTO: 46.7 % (ref 40–53)
HGB BLD-MCNC: 15.9 G/DL (ref 13.3–17.7)
MCH RBC QN AUTO: 31.9 PG (ref 26.5–33)
MCHC RBC AUTO-ENTMCNC: 34 G/DL (ref 31.5–36.5)
MCV RBC AUTO: 94 FL (ref 78–100)
PLATELET # BLD AUTO: 230 10E9/L (ref 150–450)
RBC # BLD AUTO: 4.99 10E12/L (ref 4.4–5.9)
WBC # BLD AUTO: 7.2 10E9/L (ref 4–11)

## 2017-06-14 PROCEDURE — 93000 ELECTROCARDIOGRAM COMPLETE: CPT | Performed by: FAMILY MEDICINE

## 2017-06-14 PROCEDURE — 36415 COLL VENOUS BLD VENIPUNCTURE: CPT | Performed by: FAMILY MEDICINE

## 2017-06-14 PROCEDURE — 99214 OFFICE O/P EST MOD 30 MIN: CPT | Performed by: FAMILY MEDICINE

## 2017-06-14 PROCEDURE — 85027 COMPLETE CBC AUTOMATED: CPT | Performed by: FAMILY MEDICINE

## 2017-06-14 PROCEDURE — 80048 BASIC METABOLIC PNL TOTAL CA: CPT | Performed by: FAMILY MEDICINE

## 2017-06-14 NOTE — MR AVS SNAPSHOT
After Visit Summary   6/14/2017    Yogi Moreland    MRN: [id number]           Patient Information     Date Of Birth          1960        Visit Information        Provider Department      6/14/2017 11:00 AM Randolph Mueller MD [facility]        Today's Diagnoses     Preop general physical exam    -  1      Care Instructions      Before Your Surgery      Call your surgeon if there is any change in your health. This includes signs of a cold or flu (such as a sore throat, runny nose, cough, rash or fever).    Do not smoke, drink alcohol or take over the counter medicine (unless your surgeon or primary care doctor tells you to) for the 24 hours before and after surgery.    If you take prescribed drugs: Follow your doctor s orders about which medicines to take and which to stop until after surgery.    Eating and drinking prior to surgery: follow the instructions from your surgeon    Take a shower or bath the night before surgery. Use the soap your surgeon gave you to gently clean your skin. If you do not have soap from your surgeon, use your regular soap. Do not shave or scrub the surgery site.  Wear clean pajamas and have clean sheets on your bed.           Follow-ups after your visit        Your next 10 appointments already scheduled     Jul 11, 2017   Procedure with Chuy Reddy MD   [facility] (--)    [address]  [facility] [id number] [phone]            Jul 25, 2017  8:50 AM CDT   Post-Op with Chuy Reddy MD   [facility] [place] ([facility] [place] [facility])    [address]  [address]  [facility] [id number] [phone]              Who to contact     If you have questions or need follow up information about today's clinic visit or your schedule please contact [facility] directly at [phone].  Normal or non-critical lab and imaging results will be  communicated to you by ProtectWisehart, letter or phone within 4 business days after the clinic has received the results. If you do not hear from us within 7 days, please contact the clinic through Ethical Deal or phone. If you have a critical or abnormal lab result, we will notify you by phone as soon as possible.  Submit refill requests through Ethical Deal or call your pharmacy and they will forward the refill request to us. Please allow 3 business days for your refill to be completed.          Additional Information About Your Visit        Ethical Deal Information     Ethical Deal gives you secure access to your electronic health record. If you see a primary care provider, you can also send messages to your care team and make appointments. If you have questions, please call your primary care clinic.  If you do not have a primary care provider, please call [phone] and they will assist you.        Care EveryWhere ID     This is your Care EveryWhere ID. This could be used by other organizations to access your [place] medical records  [id number]        Your Vitals Were     Pulse Temperature Height Pulse Oximetry BMI (Body Mass Index)       68 98.4  F (36.9  C) (Oral) 6' (1.829 m) 98% 23.15 kg/m2        Blood Pressure from Last 3 Encounters:   06/14/17 116/73   04/05/17 132/72   03/21/17 124/66    Weight from Last 3 Encounters:   06/14/17 170 lb 11.2 oz (77.4 kg)   04/05/17 172 lb (78 kg)   03/21/17 162 lb (73.5 kg)              We Performed the Following     Basic metabolic panel  (Ca, Cl, CO2, Creat, Gluc, K, Na, BUN)     CBC with platelets     EKG 12-lead complete w/read - Clinics        Primary Care Provider Office Phone # Fax #    Randolph Mueller [phone]       [facility] [id number] [place] [address]        Thank you!     Thank you for choosing [facility]  for your care. Our goal is always to provide you with excellent care. Hearing back from our patients is  one way we can continue to improve our services. Please take a few minutes to complete the written survey that you may receive in the mail after your visit with us. Thank you!             Your Updated Medication List - Protect others around you: Learn how to safely use, store and throw away your medicines at www.disposemymeds.org.      Notice  As of 6/14/2017  2:35 PM    You have not been prescribed any medications.

## 2017-06-14 NOTE — PROGRESS NOTES
[facility]  [address] [address]  [phone]  Dept: [phone]    PRE-OP EVALUATION:  Today's date: 2017    Yogi Moreland (: 1960) presents for pre-operative evaluation assessment as requested by Dr. Maureen Vera.  He requires evaluation and anesthesia risk assessment prior to undergoing surgery/procedure for treatment of prostate  .  Proposed procedure:     Date of Surgery/ Procedure: 17  Time of Surgery/ Procedure: 12:40pm  Hospital/Surgical Facility: [facility]   Fax number for surgical facility:   Primary Physician: Randolph Mueller  Type of Anesthesia Anticipated: General    Patient has a Health Care Directive or Living Will:  YES     1. NO - Do you have a history of heart attack, stroke, stent, bypass or surgery on an artery in the head, neck, heart or legs?  2. NO - Do you ever have any pain or discomfort in your chest?  3. NO - Do you have a history of  Heart Failure?  4. NO - Are you troubled by shortness of breath when: walking on the level, up a slight hill or at night?  5. NO - Do you currently have a cold, bronchitis or other respiratory infection?  6. NO - Do you have a cough, shortness of breath or wheezing?  7. NO - Do you sometimes get pains in the calves of your legs when you walk?  8. NO - Do you or anyone in your family have previous history of blood clots?  9. NO - Do you or does anyone in your family have a serious bleeding problem such as prolonged bleeding following surgeries or cuts?  10. NO - Have you ever had problems with anemia or been told to take iron pills?  11. NO - Have you had any abnormal blood loss such as black, tarry or bloody stools, or abnormal vaginal bleeding?  12. NO - Have you ever had a blood transfusion?  13. NO - Have you or any of your relatives ever had problems with anesthesia?  14. NO - Do you have sleep apnea, excessive snoring or daytime drowsiness?  15. NO - Do you have any  prosthetic heart valves?  16. NO - Do you have prosthetic joints?  17. NO - Is there any chance that you may be pregnant?      HPI:                                                      Brief HPI related to upcoming procedure: prostatectomy surgery      Abdominal surgery last year for Colonic diverticulitos with colostomy later taken down     MEDICAL HISTORY:                                                      Patient Active Problem List    Diagnosis Date Noted     Prostate cancer (H) 04/06/2017     Priority: Medium     S/P colostomy takedown 12/27/2016     Priority: Medium     Diverticulitis of colon with perforation 09/26/2016     Priority: Medium     Elevated LFTs 09/02/2016     Priority: Medium     Elevated PSA 01/25/2013     History of colon polyps 01/23/2013     Hyperlipidemia LDL goal <160 01/23/2013     Impaired fasting glucose 01/23/2013      Past Medical History:   Diagnosis Date     Colon polyp      Elevated LFTs 8/2016     Elevated PSA      Enlarged prostate      Hyperlipidemia      Past Surgical History:   Procedure Laterality Date     APPENDECTOMY OPEN N/A 9/26/2016    Procedure: APPENDECTOMY OPEN;  Surgeon: Ramón Masters MD;  Location: [facility] OR     COLECTOMY WITH COLOSTOMY, COMBINED N/A 9/26/2016    Procedure: COMBINED COLECTOMY WITH COLOSTOMY;  Surgeon: Ramón Masters MD;  Location: [facility] OR     COLONOSCOPY  2009     COLONOSCOPY  2/2013     LAPAROSCOPIC ASSISTED COLOSTOMY TAKEDOWN N/A 12/27/2016    Procedure: LAPAROSCOPIC ASSISTED COLOSTOMY TAKEDOWN;  Surgeon: Ramón Masters MD;  Location: [facility] OR     LAPAROTOMY EXPLORATORY N/A 9/26/2016    Procedure: LAPAROTOMY EXPLORATORY;  Surgeon: Ramón Masters MD;  Location: [facility] OR     No current outpatient prescriptions on file.     OTC products: None, except as noted above    No Known Allergies   Latex Allergy: NO    Social History   Substance Use Topics     Smoking status: Never Smoker     Smokeless tobacco: Never Used     Alcohol use 0.0 oz/week      "0 Standard drinks or equivalent per week      Comment: 1 drink per day, binge drinks \"too drunk to drive\" once per month.     History   Drug Use No       REVIEW OF SYSTEMS:                                                    Constitutional, neuro, ENT, endocrine, pulmonary, cardiac, gastrointestinal, genitourinary, musculoskeletal, integument and psychiatric systems are negative, except as otherwise noted.    EXAM:                                                    There were no vitals taken for this visit.    GENERAL APPEARANCE: healthy, alert and no distress     EYES: EOMI,  PERRL     HENT: ear canals and TM's normal and nose and mouth without ulcers or lesions     NECK: no adenopathy, no asymmetry, masses, or scars and thyroid normal to palpation     RESP: lungs clear to auscultation - no rales, rhonchi or wheezes     CV: regular rates and rhythm, normal S1 S2, no S3 or S4 and no murmur, click or rub     ABDOMEN:  soft, nontender, no HSM or masses and bowel sounds normal     MS: extremities normal- no gross deformities noted, no evidence of inflammation in joints, FROM in all extremities.     SKIN: no suspicious lesions or rashes     NEURO: Normal strength and tone, sensory exam grossly normal, mentation intact and speech normal     PSYCH: mentation appears normal. and affect normal/bright     LYMPHATICS: No axillary, cervical, or supraclavicular nodes    DIAGNOSTICS:                                                    EKG: appears normal, NSR, normal axis, normal intervals, no acute ST/T changes c/w ischemia, no LVH by voltage criteria, unchanged from previous tracings    Recent Labs   Lab Test  12/30/16   0725  12/27/16   1550  12/22/16   1050  11/02/16   0757   09/26/16   0445  09/04/16   2240   08/26/16   1038   HGB   --    --   14.0   --    --   14.0  14.0   < >  14.7   PLT  273  218  248   --    < >  223  333   < >  218   INR   --    --    --    --    --    --   1.01   --    --    NA   --    --   141  142   " --   136  137   < >  135   POTASSIUM   --    --   3.7  4.3   --   4.1  4.0   < >  4.2   CR   --   0.74  0.72  0.77   < >  0.68  0.74   < >  0.93   A1C   --    --    --    --    --    --    --    --   5.6    < > = values in this interval not displayed.        IMPRESSION:                                                    Reason for surgery/procedure: prostate cancer   Diagnosis/reason for consult: preop    The proposed surgical procedure is considered INTERMEDIATE risk.    REVISED CARDIAC RISK INDEX  The patient has the following serious cardiovascular risks for perioperative complications such as (MI, PE, VFib and 3  AV Block):  No serious cardiac risks  INTERPRETATION: 1 risks: Class II (low risk - 0.9% complication rate)    The patient has the following additional risks for perioperative complications:  No identified additional risks      RECOMMENDATIONS:                                                              Approved for surgery and anaestheisa  (Z01.818) Preop general physical exam  (primary encounter diagnosis)  Comment:   Plan: CBC with platelets, Basic metabolic panel  (Ca,        Cl, CO2, Creat, Gluc, K, Na, BUN), EKG 12-lead         complete w/read - Clinics                   Signed Electronically by: Randolph Mueller MD    Copy of this evaluation report is provided to requesting physician.    [facility] Preop Guidelines

## 2017-06-14 NOTE — NURSING NOTE
Chief Complaint   Patient presents with     Pre-Op Exam     prostate        Initial /73 (BP Location: Right arm, Patient Position: Chair, Cuff Size: Adult Regular)  Pulse 68  Temp 98.4  F (36.9  C) (Oral)  Ht 6' (1.829 m)  Wt 170 lb 11.2 oz (77.4 kg)  SpO2 98%  BMI 23.15 kg/m2 Estimated body mass index is 23.15 kg/(m^2) as calculated from the following:    Height as of this encounter: 6' (1.829 m).    Weight as of this encounter: 170 lb 11.2 oz (77.4 kg).  Medication Reconciliation: complete

## 2017-06-15 LAB
ANION GAP SERPL CALCULATED.3IONS-SCNC: 10 MMOL/L (ref 3–14)
BUN SERPL-MCNC: 25 MG/DL (ref 7–30)
CALCIUM SERPL-MCNC: 9 MG/DL (ref 8.5–10.1)
CHLORIDE SERPL-SCNC: 105 MMOL/L (ref 94–109)
CO2 SERPL-SCNC: 26 MMOL/L (ref 20–32)
CREAT SERPL-MCNC: 0.8 MG/DL (ref 0.66–1.25)
GFR SERPL CREATININE-BSD FRML MDRD: NORMAL ML/MIN/1.7M2
GLUCOSE SERPL-MCNC: 97 MG/DL (ref 70–99)
POTASSIUM SERPL-SCNC: 4.9 MMOL/L (ref 3.4–5.3)
SODIUM SERPL-SCNC: 141 MMOL/L (ref 133–144)

## 2017-07-07 NOTE — PHARMACY-ADMISSION MEDICATION HISTORY
Med rec completed by pre admitting Lynette Lakhani RN Fri Jul 7, 2017  1:20 PM         Prior to Admission medications    Medication Sig Last Dose Taking? Auth Provider   Ibuprofen (ADVIL PO) Take 400 mg by mouth every 6 hours as needed for moderate pain   Reported, Patient

## 2017-07-07 NOTE — H&P (VIEW-ONLY)
[facility]  [address] [facility] [address]  [phone]  Dept: [phone]    PRE-OP EVALUATION:  Today's date: 2017    Yogi Moreland (: 1960) presents for pre-operative evaluation assessment as requested by Dr. Maureen Vera.  He requires evaluation and anesthesia risk assessment prior to undergoing surgery/procedure for treatment of prostate  .  Proposed procedure:     Date of Surgery/ Procedure: 17  Time of Surgery/ Procedure: 12:40pm  Hospital/Surgical Facility: [facility]   Fax number for surgical facility:   Primary Physician: Randolph Mueller  Type of Anesthesia Anticipated: General    Patient has a Health Care Directive or Living Will:  YES     1. NO - Do you have a history of heart attack, stroke, stent, bypass or surgery on an artery in the head, neck, heart or legs?  2. NO - Do you ever have any pain or discomfort in your chest?  3. NO - Do you have a history of  Heart Failure?  4. NO - Are you troubled by shortness of breath when: walking on the level, up a slight hill or at night?  5. NO - Do you currently have a cold, bronchitis or other respiratory infection?  6. NO - Do you have a cough, shortness of breath or wheezing?  7. NO - Do you sometimes get pains in the calves of your legs when you walk?  8. NO - Do you or anyone in your family have previous history of blood clots?  9. NO - Do you or does anyone in your family have a serious bleeding problem such as prolonged bleeding following surgeries or cuts?  10. NO - Have you ever had problems with anemia or been told to take iron pills?  11. NO - Have you had any abnormal blood loss such as black, tarry or bloody stools, or abnormal vaginal bleeding?  12. NO - Have you ever had a blood transfusion?  13. NO - Have you or any of your relatives ever had problems with anesthesia?  14. NO - Do you have sleep apnea, excessive snoring or daytime drowsiness?  15. NO - Do you have any  prosthetic heart valves?  16. NO - Do you have prosthetic joints?  17. NO - Is there any chance that you may be pregnant?      HPI:                                                      Brief HPI related to upcoming procedure: prostatectomy surgery      Abdominal surgery last year for Colonic diverticulitos with colostomy later taken down     MEDICAL HISTORY:                                                      Patient Active Problem List    Diagnosis Date Noted     Prostate cancer (H) 04/06/2017     Priority: Medium     S/P colostomy takedown 12/27/2016     Priority: Medium     Diverticulitis of colon with perforation 09/26/2016     Priority: Medium     Elevated LFTs 09/02/2016     Priority: Medium     Elevated PSA 01/25/2013     History of colon polyps 01/23/2013     Hyperlipidemia LDL goal <160 01/23/2013     Impaired fasting glucose 01/23/2013      Past Medical History:   Diagnosis Date     Colon polyp      Elevated LFTs 8/2016     Elevated PSA      Enlarged prostate      Hyperlipidemia      Past Surgical History:   Procedure Laterality Date     APPENDECTOMY OPEN N/A 9/26/2016    Procedure: APPENDECTOMY OPEN;  Surgeon: Ramón Masters MD;  Location: [facility] OR     COLECTOMY WITH COLOSTOMY, COMBINED N/A 9/26/2016    Procedure: COMBINED COLECTOMY WITH COLOSTOMY;  Surgeon: Ramón Masters MD;  Location: [facility] OR     COLONOSCOPY  2009     COLONOSCOPY  2/2013     LAPAROSCOPIC ASSISTED COLOSTOMY TAKEDOWN N/A 12/27/2016    Procedure: LAPAROSCOPIC ASSISTED COLOSTOMY TAKEDOWN;  Surgeon: Ramón Masters MD;  Location: [facility] OR     LAPAROTOMY EXPLORATORY N/A 9/26/2016    Procedure: LAPAROTOMY EXPLORATORY;  Surgeon: Ramón Masters MD;  Location: [facility] OR     No current outpatient prescriptions on file.     OTC products: None, except as noted above    No Known Allergies   Latex Allergy: NO    Social History   Substance Use Topics     Smoking status: Never Smoker     Smokeless tobacco: Never Used     Alcohol use 0.0 oz/week      "0 Standard drinks or equivalent per week      Comment: 1 drink per day, binge drinks \"too drunk to drive\" once per month.     History   Drug Use No       REVIEW OF SYSTEMS:                                                    Constitutional, neuro, ENT, endocrine, pulmonary, cardiac, gastrointestinal, genitourinary, musculoskeletal, integument and psychiatric systems are negative, except as otherwise noted.    EXAM:                                                    There were no vitals taken for this visit.    GENERAL APPEARANCE: healthy, alert and no distress     EYES: EOMI,  PERRL     HENT: ear canals and TM's normal and nose and mouth without ulcers or lesions     NECK: no adenopathy, no asymmetry, masses, or scars and thyroid normal to palpation     RESP: lungs clear to auscultation - no rales, rhonchi or wheezes     CV: regular rates and rhythm, normal S1 S2, no S3 or S4 and no murmur, click or rub     ABDOMEN:  soft, nontender, no HSM or masses and bowel sounds normal     MS: extremities normal- no gross deformities noted, no evidence of inflammation in joints, FROM in all extremities.     SKIN: no suspicious lesions or rashes     NEURO: Normal strength and tone, sensory exam grossly normal, mentation intact and speech normal     PSYCH: mentation appears normal. and affect normal/bright     LYMPHATICS: No axillary, cervical, or supraclavicular nodes    DIAGNOSTICS:                                                    EKG: appears normal, NSR, normal axis, normal intervals, no acute ST/T changes c/w ischemia, no LVH by voltage criteria, unchanged from previous tracings    Recent Labs   Lab Test  12/30/16   0725  12/27/16   1550  12/22/16   1050  11/02/16   0757   09/26/16   0445  09/04/16   2240   08/26/16   1038   HGB   --    --   14.0   --    --   14.0  14.0   < >  14.7   PLT  273  218  248   --    < >  223  333   < >  218   INR   --    --    --    --    --    --   1.01   --    --    NA   --    --   141  142   " --   136  137   < >  135   POTASSIUM   --    --   3.7  4.3   --   4.1  4.0   < >  4.2   CR   --   0.74  0.72  0.77   < >  0.68  0.74   < >  0.93   A1C   --    --    --    --    --    --    --    --   5.6    < > = values in this interval not displayed.        IMPRESSION:                                                    Reason for surgery/procedure: prostate cancer   Diagnosis/reason for consult: preop    The proposed surgical procedure is considered INTERMEDIATE risk.    REVISED CARDIAC RISK INDEX  The patient has the following serious cardiovascular risks for perioperative complications such as (MI, PE, VFib and 3  AV Block):  No serious cardiac risks  INTERPRETATION: 1 risks: Class II (low risk - 0.9% complication rate)    The patient has the following additional risks for perioperative complications:  No identified additional risks      RECOMMENDATIONS:                                                              Approved for surgery and anaestheisa  (Z01.818) Preop general physical exam  (primary encounter diagnosis)  Comment:   Plan: CBC with platelets, Basic metabolic panel  (Ca,        Cl, CO2, Creat, Gluc, K, Na, BUN), EKG 12-lead         complete w/read - Clinics                   Signed Electronically by: Randolph Mueller MD    Copy of this evaluation report is provided to requesting physician.    [facility] Preop Guidelines

## 2017-07-10 ENCOUNTER — HOSPITAL ENCOUNTER (OUTPATIENT)
Dept: LAB | Facility: CLINIC | Age: 57
Discharge: HOME | End: 2017-07-10
Attending: UROLOGY | Admitting: UROLOGY
Payer: COMMERCIAL

## 2017-07-10 DIAGNOSIS — Z01.812 PRE-OPERATIVE LABORATORY EXAMINATION: ICD-10-CM

## 2017-07-10 LAB
ABO + RH BLD: NORMAL
ABO + RH BLD: NORMAL
BLD GP AB SCN SERPL QL: NORMAL
BLOOD BANK CMNT PATIENT-IMP: NORMAL
SPECIMEN EXP DATE BLD: NORMAL

## 2017-07-10 PROCEDURE — 86901 BLOOD TYPING SEROLOGIC RH(D): CPT | Performed by: UROLOGY

## 2017-07-10 PROCEDURE — 86850 RBC ANTIBODY SCREEN: CPT | Performed by: UROLOGY

## 2017-07-10 PROCEDURE — 86900 BLOOD TYPING SEROLOGIC ABO: CPT | Performed by: UROLOGY

## 2017-07-10 PROCEDURE — 36415 COLL VENOUS BLD VENIPUNCTURE: CPT | Performed by: UROLOGY

## 2017-07-11 ENCOUNTER — SURGERY (OUTPATIENT)
Age: 57
End: 2017-07-11

## 2017-07-11 ENCOUNTER — ANESTHESIA EVENT (OUTPATIENT)
Dept: SURGERY | Facility: CLINIC | Age: 57
DRG: 708 | End: 2017-07-11
Payer: COMMERCIAL

## 2017-07-11 ENCOUNTER — ANESTHESIA (OUTPATIENT)
Dept: SURGERY | Facility: CLINIC | Age: 57
DRG: 708 | End: 2017-07-11
Payer: COMMERCIAL

## 2017-07-11 ENCOUNTER — HOSPITAL ENCOUNTER (INPATIENT)
Facility: CLINIC | Age: 57
LOS: 2 days | Discharge: HOME | DRG: 708 | End: 2017-07-13
Attending: UROLOGY | Admitting: UROLOGY
Payer: COMMERCIAL

## 2017-07-11 DIAGNOSIS — G89.18 ACUTE POST-OPERATIVE PAIN: ICD-10-CM

## 2017-07-11 DIAGNOSIS — C61 PROSTATE CANCER (H): Primary | ICD-10-CM

## 2017-07-11 LAB
ANION GAP SERPL CALCULATED.3IONS-SCNC: 5 MMOL/L (ref 3–14)
BUN SERPL-MCNC: 25 MG/DL (ref 7–30)
CALCIUM SERPL-MCNC: 8 MG/DL (ref 8.5–10.1)
CHLORIDE SERPL-SCNC: 107 MMOL/L (ref 94–109)
CO2 SERPL-SCNC: 28 MMOL/L (ref 20–32)
CREAT SERPL-MCNC: 0.89 MG/DL (ref 0.66–1.25)
ERYTHROCYTE [DISTWIDTH] IN BLOOD BY AUTOMATED COUNT: 12.8 % (ref 10–15)
GFR SERPL CREATININE-BSD FRML MDRD: 87 ML/MIN/1.7M2
GLUCOSE SERPL-MCNC: 141 MG/DL (ref 70–99)
HCT VFR BLD AUTO: 41.5 % (ref 40–53)
HGB BLD-MCNC: 13.6 G/DL (ref 13.3–17.7)
MCH RBC QN AUTO: 30.6 PG (ref 26.5–33)
MCHC RBC AUTO-ENTMCNC: 32.8 G/DL (ref 31.5–36.5)
MCV RBC AUTO: 94 FL (ref 78–100)
PLATELET # BLD AUTO: 230 10E9/L (ref 150–450)
POTASSIUM SERPL-SCNC: 4.5 MMOL/L (ref 3.4–5.3)
RBC # BLD AUTO: 4.44 10E12/L (ref 4.4–5.9)
SODIUM SERPL-SCNC: 140 MMOL/L (ref 133–144)
WBC # BLD AUTO: 14.4 10E9/L (ref 4–11)

## 2017-07-11 PROCEDURE — 25000566 ZZH SEVOFLURANE, EA 15 MIN: Performed by: UROLOGY

## 2017-07-11 PROCEDURE — 88309 TISSUE EXAM BY PATHOLOGIST: CPT | Mod: 26 | Performed by: UROLOGY

## 2017-07-11 PROCEDURE — 12000007 ZZH R&B INTERMEDIATE

## 2017-07-11 PROCEDURE — 25000128 H RX IP 250 OP 636: Performed by: ANESTHESIOLOGY

## 2017-07-11 PROCEDURE — 0VBQ0ZZ EXCISION OF BILATERAL VAS DEFERENS, OPEN APPROACH: ICD-10-PCS | Performed by: UROLOGY

## 2017-07-11 PROCEDURE — 25000128 H RX IP 250 OP 636: Performed by: NURSE ANESTHETIST, CERTIFIED REGISTERED

## 2017-07-11 PROCEDURE — 88309 TISSUE EXAM BY PATHOLOGIST: CPT | Performed by: UROLOGY

## 2017-07-11 PROCEDURE — 25000125 ZZHC RX 250: Performed by: NURSE ANESTHETIST, CERTIFIED REGISTERED

## 2017-07-11 PROCEDURE — 25000128 H RX IP 250 OP 636: Performed by: UROLOGY

## 2017-07-11 PROCEDURE — 25000132 ZZH RX MED GY IP 250 OP 250 PS 637: Performed by: UROLOGY

## 2017-07-11 PROCEDURE — 0VT00ZZ RESECTION OF PROSTATE, OPEN APPROACH: ICD-10-PCS | Performed by: UROLOGY

## 2017-07-11 PROCEDURE — 55845 EXTENSIVE PROSTATE SURGERY: CPT | Performed by: UROLOGY

## 2017-07-11 PROCEDURE — 07BC0ZZ EXCISION OF PELVIS LYMPHATIC, OPEN APPROACH: ICD-10-PCS | Performed by: UROLOGY

## 2017-07-11 PROCEDURE — 37000008 ZZH ANESTHESIA TECHNICAL FEE, 1ST 30 MIN: Performed by: UROLOGY

## 2017-07-11 PROCEDURE — 80048 BASIC METABOLIC PNL TOTAL CA: CPT | Performed by: UROLOGY

## 2017-07-11 PROCEDURE — 36000056 ZZH SURGERY LEVEL 3 1ST 30 MIN: Performed by: UROLOGY

## 2017-07-11 PROCEDURE — 71000012 ZZH RECOVERY PHASE 1 LEVEL 1 FIRST HR: Performed by: UROLOGY

## 2017-07-11 PROCEDURE — 36415 COLL VENOUS BLD VENIPUNCTURE: CPT | Performed by: UROLOGY

## 2017-07-11 PROCEDURE — 27210794 ZZH OR GENERAL SUPPLY STERILE: Performed by: UROLOGY

## 2017-07-11 PROCEDURE — 36000058 ZZH SURGERY LEVEL 3 EA 15 ADDTL MIN: Performed by: UROLOGY

## 2017-07-11 PROCEDURE — 85027 COMPLETE CBC AUTOMATED: CPT | Performed by: UROLOGY

## 2017-07-11 PROCEDURE — 0VT30ZZ RESECTION OF BILATERAL SEMINAL VESICLES, OPEN APPROACH: ICD-10-PCS | Performed by: UROLOGY

## 2017-07-11 PROCEDURE — 40000306 ZZH STATISTIC PRE PROC ASSESS II: Performed by: UROLOGY

## 2017-07-11 PROCEDURE — 37000009 ZZH ANESTHESIA TECHNICAL FEE, EACH ADDTL 15 MIN: Performed by: UROLOGY

## 2017-07-11 RX ORDER — CEFAZOLIN SODIUM 2 G/100ML
2 INJECTION, SOLUTION INTRAVENOUS
Status: COMPLETED | OUTPATIENT
Start: 2017-07-11 | End: 2017-07-11

## 2017-07-11 RX ORDER — FENTANYL CITRATE 50 UG/ML
INJECTION, SOLUTION INTRAMUSCULAR; INTRAVENOUS PRN
Status: DISCONTINUED | OUTPATIENT
Start: 2017-07-11 | End: 2017-07-11

## 2017-07-11 RX ORDER — CEFAZOLIN SODIUM 1 G/3ML
1 INJECTION, POWDER, FOR SOLUTION INTRAMUSCULAR; INTRAVENOUS SEE ADMIN INSTRUCTIONS
Status: DISCONTINUED | OUTPATIENT
Start: 2017-07-11 | End: 2017-07-11 | Stop reason: HOSPADM

## 2017-07-11 RX ORDER — NALOXONE HYDROCHLORIDE 0.4 MG/ML
.1-.4 INJECTION, SOLUTION INTRAMUSCULAR; INTRAVENOUS; SUBCUTANEOUS
Status: DISCONTINUED | OUTPATIENT
Start: 2017-07-11 | End: 2017-07-13 | Stop reason: HOSPADM

## 2017-07-11 RX ORDER — ACETAMINOPHEN 325 MG/1
975 TABLET ORAL EVERY 8 HOURS
Status: DISCONTINUED | OUTPATIENT
Start: 2017-07-11 | End: 2017-07-13 | Stop reason: HOSPADM

## 2017-07-11 RX ORDER — NEOSTIGMINE METHYLSULFATE 1 MG/ML
VIAL (ML) INJECTION PRN
Status: DISCONTINUED | OUTPATIENT
Start: 2017-07-11 | End: 2017-07-11

## 2017-07-11 RX ORDER — OXYCODONE HYDROCHLORIDE 5 MG/1
5-10 TABLET ORAL
Status: DISCONTINUED | OUTPATIENT
Start: 2017-07-11 | End: 2017-07-13 | Stop reason: HOSPADM

## 2017-07-11 RX ORDER — HYDROMORPHONE HYDROCHLORIDE 1 MG/ML
.3-.5 INJECTION, SOLUTION INTRAMUSCULAR; INTRAVENOUS; SUBCUTANEOUS EVERY 5 MIN PRN
Status: DISCONTINUED | OUTPATIENT
Start: 2017-07-11 | End: 2017-07-11 | Stop reason: HOSPADM

## 2017-07-11 RX ORDER — LIDOCAINE HYDROCHLORIDE 10 MG/ML
INJECTION, SOLUTION INFILTRATION; PERINEURAL PRN
Status: DISCONTINUED | OUTPATIENT
Start: 2017-07-11 | End: 2017-07-11

## 2017-07-11 RX ORDER — LIDOCAINE 40 MG/G
CREAM TOPICAL
Status: DISCONTINUED | OUTPATIENT
Start: 2017-07-11 | End: 2017-07-11 | Stop reason: HOSPADM

## 2017-07-11 RX ORDER — LIDOCAINE 40 MG/G
CREAM TOPICAL
Status: DISCONTINUED | OUTPATIENT
Start: 2017-07-11 | End: 2017-07-13 | Stop reason: HOSPADM

## 2017-07-11 RX ORDER — DEXAMETHASONE SODIUM PHOSPHATE 4 MG/ML
INJECTION, SOLUTION INTRA-ARTICULAR; INTRALESIONAL; INTRAMUSCULAR; INTRAVENOUS; SOFT TISSUE PRN
Status: DISCONTINUED | OUTPATIENT
Start: 2017-07-11 | End: 2017-07-11

## 2017-07-11 RX ORDER — NEOMYCIN/BACITRACIN/POLYMYXINB 3.5-400-5K
OINTMENT (GRAM) TOPICAL 2 TIMES DAILY
Status: DISCONTINUED | OUTPATIENT
Start: 2017-07-11 | End: 2017-07-13 | Stop reason: HOSPADM

## 2017-07-11 RX ORDER — SODIUM CHLORIDE, SODIUM LACTATE, POTASSIUM CHLORIDE, CALCIUM CHLORIDE 600; 310; 30; 20 MG/100ML; MG/100ML; MG/100ML; MG/100ML
INJECTION, SOLUTION INTRAVENOUS CONTINUOUS
Status: DISCONTINUED | OUTPATIENT
Start: 2017-07-11 | End: 2017-07-11 | Stop reason: HOSPADM

## 2017-07-11 RX ORDER — PROPOFOL 10 MG/ML
INJECTION, EMULSION INTRAVENOUS PRN
Status: DISCONTINUED | OUTPATIENT
Start: 2017-07-11 | End: 2017-07-11

## 2017-07-11 RX ORDER — LABETALOL HYDROCHLORIDE 5 MG/ML
10 INJECTION, SOLUTION INTRAVENOUS EVERY 5 MIN PRN
Status: DISCONTINUED | OUTPATIENT
Start: 2017-07-11 | End: 2017-07-11 | Stop reason: HOSPADM

## 2017-07-11 RX ORDER — PROCHLORPERAZINE MALEATE 5 MG
5-10 TABLET ORAL EVERY 6 HOURS PRN
Status: DISCONTINUED | OUTPATIENT
Start: 2017-07-11 | End: 2017-07-13 | Stop reason: HOSPADM

## 2017-07-11 RX ORDER — SODIUM CHLORIDE 9 MG/ML
INJECTION, SOLUTION INTRAVENOUS CONTINUOUS
Status: DISCONTINUED | OUTPATIENT
Start: 2017-07-11 | End: 2017-07-12

## 2017-07-11 RX ORDER — ONDANSETRON 4 MG/1
4 TABLET, ORALLY DISINTEGRATING ORAL EVERY 30 MIN PRN
Status: DISCONTINUED | OUTPATIENT
Start: 2017-07-11 | End: 2017-07-11 | Stop reason: HOSPADM

## 2017-07-11 RX ORDER — DOCUSATE SODIUM 100 MG/1
100 CAPSULE, LIQUID FILLED ORAL 2 TIMES DAILY
Status: DISCONTINUED | OUTPATIENT
Start: 2017-07-11 | End: 2017-07-13 | Stop reason: HOSPADM

## 2017-07-11 RX ORDER — ONDANSETRON 2 MG/ML
4 INJECTION INTRAMUSCULAR; INTRAVENOUS EVERY 30 MIN PRN
Status: DISCONTINUED | OUTPATIENT
Start: 2017-07-11 | End: 2017-07-11 | Stop reason: HOSPADM

## 2017-07-11 RX ORDER — ONDANSETRON 2 MG/ML
4 INJECTION INTRAMUSCULAR; INTRAVENOUS EVERY 6 HOURS PRN
Status: DISCONTINUED | OUTPATIENT
Start: 2017-07-11 | End: 2017-07-13 | Stop reason: HOSPADM

## 2017-07-11 RX ORDER — FENTANYL CITRATE 50 UG/ML
25-50 INJECTION, SOLUTION INTRAMUSCULAR; INTRAVENOUS
Status: DISCONTINUED | OUTPATIENT
Start: 2017-07-11 | End: 2017-07-11 | Stop reason: HOSPADM

## 2017-07-11 RX ORDER — ONDANSETRON 4 MG/1
4 TABLET, ORALLY DISINTEGRATING ORAL EVERY 6 HOURS PRN
Status: DISCONTINUED | OUTPATIENT
Start: 2017-07-11 | End: 2017-07-13 | Stop reason: HOSPADM

## 2017-07-11 RX ORDER — HYDROCODONE BITARTRATE AND ACETAMINOPHEN 5; 325 MG/1; MG/1
1-2 TABLET ORAL EVERY 6 HOURS PRN
Qty: 20 TABLET | Refills: 0 | Status: SHIPPED | OUTPATIENT
Start: 2017-07-11 | End: 2017-07-12

## 2017-07-11 RX ORDER — GLYCOPYRROLATE 0.2 MG/ML
INJECTION, SOLUTION INTRAMUSCULAR; INTRAVENOUS PRN
Status: DISCONTINUED | OUTPATIENT
Start: 2017-07-11 | End: 2017-07-11

## 2017-07-11 RX ORDER — ONDANSETRON 2 MG/ML
INJECTION INTRAMUSCULAR; INTRAVENOUS PRN
Status: DISCONTINUED | OUTPATIENT
Start: 2017-07-11 | End: 2017-07-11

## 2017-07-11 RX ORDER — HYDROMORPHONE HCL/0.9% NACL/PF 0.2MG/0.2
.2-.4 SYRINGE (ML) INTRAVENOUS
Status: DISCONTINUED | OUTPATIENT
Start: 2017-07-11 | End: 2017-07-13 | Stop reason: HOSPADM

## 2017-07-11 RX ORDER — KETOROLAC TROMETHAMINE 30 MG/ML
30 INJECTION, SOLUTION INTRAMUSCULAR; INTRAVENOUS EVERY 6 HOURS
Status: COMPLETED | OUTPATIENT
Start: 2017-07-11 | End: 2017-07-12

## 2017-07-11 RX ADMIN — LIDOCAINE HYDROCHLORIDE 50 MG: 10 INJECTION, SOLUTION INFILTRATION; PERINEURAL at 13:28

## 2017-07-11 RX ADMIN — FENTANYL CITRATE 50 MCG: 50 INJECTION, SOLUTION INTRAMUSCULAR; INTRAVENOUS at 13:42

## 2017-07-11 RX ADMIN — CEFAZOLIN SODIUM 2 G: 2 INJECTION, SOLUTION INTRAVENOUS at 13:17

## 2017-07-11 RX ADMIN — DEXAMETHASONE SODIUM PHOSPHATE 4 MG: 4 INJECTION, SOLUTION INTRA-ARTICULAR; INTRALESIONAL; INTRAMUSCULAR; INTRAVENOUS; SOFT TISSUE at 13:29

## 2017-07-11 RX ADMIN — SODIUM CHLORIDE: 9 INJECTION, SOLUTION INTRAVENOUS at 18:16

## 2017-07-11 RX ADMIN — SODIUM CHLORIDE, POTASSIUM CHLORIDE, SODIUM LACTATE AND CALCIUM CHLORIDE: 600; 310; 30; 20 INJECTION, SOLUTION INTRAVENOUS at 14:45

## 2017-07-11 RX ADMIN — BACITRACIN ZINC, NEOMYCIN, POLYMYXIN B: 400; 3.5; 5 OINTMENT TOPICAL at 21:34

## 2017-07-11 RX ADMIN — SODIUM CHLORIDE, POTASSIUM CHLORIDE, SODIUM LACTATE AND CALCIUM CHLORIDE: 600; 310; 30; 20 INJECTION, SOLUTION INTRAVENOUS at 13:17

## 2017-07-11 RX ADMIN — ONDANSETRON 4 MG: 2 INJECTION INTRAMUSCULAR; INTRAVENOUS at 15:23

## 2017-07-11 RX ADMIN — GLYCOPYRROLATE 0.4 MG: 0.2 INJECTION, SOLUTION INTRAMUSCULAR; INTRAVENOUS at 15:45

## 2017-07-11 RX ADMIN — KETOROLAC TROMETHAMINE 30 MG: 30 INJECTION, SOLUTION INTRAMUSCULAR at 18:22

## 2017-07-11 RX ADMIN — ROCURONIUM BROMIDE 6 MG: 10 INJECTION INTRAVENOUS at 15:06

## 2017-07-11 RX ADMIN — Medication 0.2 MG: at 21:43

## 2017-07-11 RX ADMIN — PROPOFOL 200 MG: 10 INJECTION, EMULSION INTRAVENOUS at 13:28

## 2017-07-11 RX ADMIN — FENTANYL CITRATE 50 MCG: 50 INJECTION INTRAMUSCULAR; INTRAVENOUS at 16:34

## 2017-07-11 RX ADMIN — MIDAZOLAM HYDROCHLORIDE 2 MG: 1 INJECTION, SOLUTION INTRAMUSCULAR; INTRAVENOUS at 13:17

## 2017-07-11 RX ADMIN — FENTANYL CITRATE 150 MCG: 50 INJECTION, SOLUTION INTRAMUSCULAR; INTRAVENOUS at 13:28

## 2017-07-11 RX ADMIN — CEFAZOLIN 1 G: 1 INJECTION, POWDER, FOR SOLUTION INTRAMUSCULAR; INTRAVENOUS at 15:16

## 2017-07-11 RX ADMIN — PHENYLEPHRINE HYDROCHLORIDE 100 MCG: 10 INJECTION, SOLUTION INTRAMUSCULAR; INTRAVENOUS; SUBCUTANEOUS at 15:24

## 2017-07-11 RX ADMIN — PHENYLEPHRINE HYDROCHLORIDE 100 MCG: 10 INJECTION, SOLUTION INTRAMUSCULAR; INTRAVENOUS; SUBCUTANEOUS at 14:17

## 2017-07-11 RX ADMIN — DOCUSATE SODIUM 100 MG: 100 CAPSULE, LIQUID FILLED ORAL at 21:34

## 2017-07-11 RX ADMIN — SODIUM CHLORIDE, POTASSIUM CHLORIDE, SODIUM LACTATE AND CALCIUM CHLORIDE: 600; 310; 30; 20 INJECTION, SOLUTION INTRAVENOUS at 13:47

## 2017-07-11 RX ADMIN — ROCURONIUM BROMIDE 10 MG: 10 INJECTION INTRAVENOUS at 14:07

## 2017-07-11 RX ADMIN — Medication 0.2 MG: at 18:10

## 2017-07-11 RX ADMIN — FENTANYL CITRATE 50 MCG: 50 INJECTION, SOLUTION INTRAMUSCULAR; INTRAVENOUS at 13:45

## 2017-07-11 RX ADMIN — ROCURONIUM BROMIDE 50 MG: 10 INJECTION INTRAVENOUS at 13:29

## 2017-07-11 RX ADMIN — ACETAMINOPHEN 975 MG: 325 TABLET, FILM COATED ORAL at 18:09

## 2017-07-11 RX ADMIN — PHENYLEPHRINE HYDROCHLORIDE 100 MCG: 10 INJECTION, SOLUTION INTRAMUSCULAR; INTRAVENOUS; SUBCUTANEOUS at 15:06

## 2017-07-11 RX ADMIN — FENTANYL CITRATE 50 MCG: 50 INJECTION INTRAMUSCULAR; INTRAVENOUS at 16:47

## 2017-07-11 RX ADMIN — HYDROMORPHONE HYDROCHLORIDE 0.5 MG: 1 INJECTION, SOLUTION INTRAMUSCULAR; INTRAVENOUS; SUBCUTANEOUS at 15:57

## 2017-07-11 RX ADMIN — ROCURONIUM BROMIDE 10 MG: 10 INJECTION INTRAVENOUS at 13:39

## 2017-07-11 RX ADMIN — Medication 3 MG: at 15:45

## 2017-07-11 ASSESSMENT — PAIN DESCRIPTION - DESCRIPTORS: DESCRIPTORS: CRAMPING;PRESSURE

## 2017-07-11 NOTE — IP AVS SNAPSHOT
[place] [address] [facility]    [address]    [facility] [address]    Phone:  [phone]    Fax:  [phone]                                       After Visit Summary   7/11/2017    Yogi Moreland    MRN: [id number]           After Visit Summary Signature Page     I have received my discharge instructions, and my questions have been answered. I have discussed any challenges I see with this plan with the nurse or doctor.    ..........................................................................................................................................  Patient/Patient Representative Signature      ..........................................................................................................................................  Patient Representative Print Name and Relationship to Patient    ..................................................               ................................................  Date                                            Time    ..........................................................................................................................................  Reviewed by Signature/Title    ...................................................              ..............................................  Date                                                            Time

## 2017-07-11 NOTE — BRIEF OP NOTE
[facility]    Brief Operative Note    Pre-operative diagnosis: Prostate Cancer   Post-operative diagnosis Prostate Cancer  Procedure: Procedure(s):  Retropubic Radical Prostectectomy, Left pelvic Lymph Node Dissection  - Wound Class: II-Clean Contaminated  Surgeon: Surgeon(s) and Role:     * Chuy Reddy MD - Primary     * Deon Lopez MD - Resident - Assisting     * Sapphire Lilly - Assisting  Anesthesia: General   Estimated blood loss:  400 mL  Drains:  15 Jeff drains x2, 20 Fr urethral catheter with 15 cc in balloon  Specimens: Prostate and seminal vesicles  Findings:   60 cc prostate, grossly negative margins, only left sided LND performed due to minimal right sided lymphatic tissue being present  Complications: None.  Implants: None.

## 2017-07-11 NOTE — ANESTHESIA CARE TRANSFER NOTE
Patient: Yogi Moreland    Procedure(s):  Retropubic Radical Prostectectomy, Bilateral pelvic Lymph Node Dissection  - Wound Class: II-Clean Contaminated    Diagnosis: Prostate Cancer   Diagnosis Additional Information: No value filed.    Anesthesia Type:   General, ETT     Note:  Airway :Face Mask  Patient transferred to:PACU  Comments: To PACU, adequate gas exchange, report to RN.      Vitals: (Last set prior to Anesthesia Care Transfer)    CRNA VITALS  7/11/2017 1537 - 7/11/2017 1613      7/11/2017             Pulse: 97    SpO2: 99 %    Resp Rate (observed): 10                Electronically Signed By: RIGO Harrington CRNA  July 11, 2017  4:13 PM

## 2017-07-11 NOTE — ANESTHESIA POSTPROCEDURE EVALUATION
Patient: Yogi Moreland    Procedure(s):  Retropubic Radical Prostectectomy, Bilateral pelvic Lymph Node Dissection  - Wound Class: II-Clean Contaminated    Diagnosis:Prostate Cancer   Diagnosis Additional Information: Prostate Cancer     Anesthesia Type:  General, ETT    Note:  Anesthesia Post Evaluation    Patient location during evaluation: PACU  Patient participation: Able to fully participate in evaluation  Level of consciousness: awake and alert  Pain management: adequate  Airway patency: patent  Cardiovascular status: acceptable  Respiratory status: acceptable  Hydration status: acceptable  PONV: controlled     Anesthetic complications: None          Last vitals:  Vitals:    07/11/17 1148 07/11/17 1610 07/11/17 1615   BP: 124/81 126/75 120/72   Resp: 16 12 15   Temp: 97.2  F (36.2  C) 98.1  F (36.7  C)    SpO2: 99% 96% 98%         Electronically Signed By: Kiel Barrera MD  July 11, 2017  4:23 PM

## 2017-07-11 NOTE — IP AVS SNAPSHOT
MRN:[id number]                      After Visit Summary   7/11/2017    Yogi Davey    MRN: [id number]           Thank you!     Thank you for choosing [facility] for your care. Our goal is always to provide you with excellent care. Hearing back from our patients is one way we can continue to improve our services. Please take a few minutes to complete the written survey that you may receive in the mail after you visit. If you would like to speak to someone directly about your visit please contact Patient Relations at [phone]. Thank you!          Patient Information     Date Of Birth          1960        Designated Caregiver       Most Recent Value    Caregiver    Will someone help with your care after discharge? yes    Name of designated caregiver sumaya davey    Phone number of caregiver [phone]    Caregiver address [place], [place]      About your hospital stay     You were admitted on:  July 11, 2017 You last received care in the:  [place] [address] [facility]    You were discharged on:  July 13, 2017        Reason for your hospital stay       Surgery                  Who to Call     For medical emergencies, please call 911.  For non-urgent questions about your medical care, please call your primary care provider or clinic, [phone]  For questions related to your surgery, please call your surgery clinic        Attending Provider     Provider Specialty    Chuy Reddy MD Urology       Primary Care Provider Office Phone # Fax #    Randolph Mueller [phone]       When to contact your care team       Fevers, chills, nausea, vomiting, uncontrolled pain, passing large blood clots.   [phone]                  After Care Instructions     Activity       Your activity upon discharge: No lifting >10 lbs until your post op appt. Take a stool softner regularly to prevent straining.            Diet       Follow this diet upon  discharge: Regular            Wound care and dressings       Instructions to care for your wound at home: ice to area for comfort, keep clean and dry.                  Follow-up Appointments     Follow-up and recommended labs and tests       As scheduled with Dr. Reddy on 7/25/17.                  Your next 10 appointments already scheduled     Jul 25, 2017  9:00 AM CDT   Post-Op with Chuy Reddy MD   [facility] ([facility])    [address]  [address]  [facility] [address] [phone]              Further instructions from your care team       Clean around catheter tubing two times daily and apply bacitracin as needed.     Avoid straining with bowel movements.     Rinse catheter bag with vinegar and water, clean ends of tubing with alcohol wipes.        Pending Results     No orders found from 7/9/2017 to 7/12/2017.            Statement of Approval     Ordered          07/13/17 0849  I have reviewed and agree with all the recommendations and orders detailed in this document.  EFFECTIVE NOW     Approved and electronically signed by:  Deon Lopez MD             Admission Information     Date & Time Provider Department Dept. Phone    7/11/2017 Chuy Reddy MD [place] [address] [facility] [phone]      Your Vitals Were     Blood Pressure Temperature Respirations Height Weight Pulse Oximetry    108/61 (BP Location: Right arm) 98.6  F (37  C) (Oral) 18 1.829 m (6') 80.4 kg (177 lb 4.8 oz) 98%    BMI (Body Mass Index)                   24.05 kg/m2           MyChart Information     [facility] gives you secure access to your electronic health record. If you see a primary care provider, you can also send messages to your care team and make appointments. If you have questions, please call your primary care clinic.  If you do not have a primary care provider, please call [phone] and they will assist you.        Care  EveryWhere ID     This is your Care EveryWhere ID. This could be used by other organizations to access your [facility] medical records  [id number]        Equal Access to Services     MAYDA TEJADA : Jo Ann Motta, rosmery singh, fideliacamron alvaresmarileejonn gonzaleztienjonn, waxbobby brianin hayaaminoo gonzalezbraydon alisahalima leidy shipmanNeal So [facility] [phone].    ATENCIÓN: Si habla español, tiene a solis disposición servicios gratuitos de asistencia lingüística. Llame al [phone].    We comply with applicable federal civil rights laws and [place] laws. We do not discriminate on the basis of race, color, national origin, age, disability sex, sexual orientation or gender identity.               Review of your medicines      START taking        Dose / Directions    ciprofloxacin 250 MG tablet   Commonly known as:  CIPRO   Used for:  Prostate cancer (H)        Dose:  250 mg   Take 1 tablet (250 mg) by mouth daily   Quantity:  14 tablet   Refills:  0       docusate sodium 100 MG capsule   Commonly known as:  COLACE   Used for:  Prostate cancer (H)        Dose:  100 mg   Take 1 capsule (100 mg) by mouth 2 times daily   Quantity:  30 capsule   Refills:  0       oxyCODONE 5 MG IR tablet   Commonly known as:  ROXICODONE   Used for:  Acute post-operative pain   Notes to Patient:  Available anytime        Dose:  5-10 mg   Take 1-2 tablets (5-10 mg) by mouth every 3 hours as needed for moderate to severe pain   Quantity:  20 tablet   Refills:  0         CONTINUE these medicines which have NOT CHANGED        Dose / Directions    ADVIL PO        Dose:  400 mg   Take 400 mg by mouth every 6 hours as needed for moderate pain   Refills:  0            Where to get your medicines      These medications were sent to [facility] [place], [place] - [address] [facility]  [address] [facility] [address]     Phone:  [phone]     ciprofloxacin 250 MG tablet    docusate sodium 100 MG capsule         Some of these will need a  paper prescription and others can be bought over the counter. Ask your nurse if you have questions.     Bring a paper prescription for each of these medications     oxyCODONE 5 MG IR tablet                Protect others around you: Learn how to safely use, store and throw away your medicines at www.disposemymeds.org.             Medication List: This is a list of all your medications and when to take them. Check marks below indicate your daily home schedule. Keep this list as a reference.      Medications           Morning Afternoon Evening Bedtime As Needed    ADVIL PO   Take 400 mg by mouth every 6 hours as needed for moderate pain                                   ciprofloxacin 250 MG tablet   Commonly known as:  CIPRO   Take 1 tablet (250 mg) by mouth daily   Next Dose Due:  7/13- Supper                                   docusate sodium 100 MG capsule   Commonly known as:  COLACE   Take 1 capsule (100 mg) by mouth 2 times daily   Last time this was given:  100 mg on 7/13/2017  8:47 AM   Next Dose Due:  7/13- Evening                                      oxyCODONE 5 MG IR tablet   Commonly known as:  ROXICODONE   Take 1-2 tablets (5-10 mg) by mouth every 3 hours as needed for moderate to severe pain   Last time this was given:  5 mg on 7/12/2017  9:00 PM   Notes to Patient:  Available anytime

## 2017-07-11 NOTE — OP NOTE
Preoperative diagnosis: Adenocarcinoma the prostate  Postoperative diagnosis: Same  Procedure: Left obturator lymph node dissection, radical retropubic prostatectomy  Surgeons: John Reddy Grossinger  Anesthesia: Gen. endotracheal   Estimated blood loss: 400 cc   Indications: The patient is a 56-year-old gentleman with a grade 3+4 adenocarcinoma of the prostate at the left base medially. He has clinical stage TIc disease with a PSA of 7.46. His diagnosis was made in March of this here. He underwent a sigmoidectomy with colostomy followed by colostomy takedown in December last year. He has been on hormonal therapy since his diagnosis. He now presents for radical retropubic prostatectomy and bilateral pelvic lymph node dissection. The procedure, the alternatives, risks and follow-up were carefully discussed with the patient and his wife     Procedure: Patient received 2 g of IV Ancef and was taken to the operating suite and placed supine on the operating table. After adequate general endotracheal anesthesia the patient's abdomen was shaved and prepped and draped in a sterile fashion. He was supine on the operating table with the kidney rest raised and the table flexed. A Sandra catheter was inserted in the bladder from the table and placed to close gravity drainage. Midline incision was made from the level of the symphysis pubis to just below the umbilicus. This was carried down through Camper's and Vania's fashion was the electrocautery and some subcutaneous bleeders were cauterized. The anterior rectus fascia was opened with electrocautery. The rectus muscle bellies were divided in the midline bluntly and the transversalis fascia was opened with electrocautery. The retropubic space was developed bluntly. The lateral retropubic space was also developed bluntly and a small tear in the peritoneum was made on the right side and repaired with a running 30 plain suture at the end of the case. A [place] retractor was  placed for continuous exposure. Examination of the pelvis revealed essentially no obturator tissue on the patient's right side. On the left side there is a small chain of obturator tissue that was excised between surgical clips.    The superficial dorsal veins of the penis were cauterized and divided. The endopelvic fascia was opened on each side of the prostate were opened sharply and developed bluntly. The skeletal muscle fibers from the urogenital diaphragm to the anterior surface of the prostate were bluntly dissected off the prostate. The puboprostatic ligaments were not divided. The plane between the deep dorsal vein complex and the urethra was then developed bluntly. The dorsal vein complex was tied off proximally with 2 ties of 0 Vicryl suture. Distally the complex was controlled with a curved [place] clamp and the complex was divided. An additional suture ligature of 2-0 chromic was placed proximally for hemostasis. The urethra was visualized. The posterior urethra was bluntly dissected off the rectourethralis muscle with a right ankle clamp. The anterior urethra was then divided several millimeters beyond the apex of the prostate with a scalpel. The Sandra catheter was brought through this incision with a tonsil clamp and then clamped with a [place] and divided and held for traction. The posterior urethra was then divided with the scalpel. The plane between the posterior prostatic capsule and the anterior rectal wall was then bluntly dissected. The lateral pedicles of the prostate were then divided on each side in a nerve sparing technique with surgical clips used for hemostasis. This was carried back to the seminal vesicles. We then opened and diabetes fascia sharply and the ampulla of the vasa were bluntly dissected and divided between surgical clips. The seminal vesicles were then lightly dissected off the posterior bladder wall and divided at their vascular apices between surgical clips. The bladder neck  was then developed in a retrograde fashion using electrocautery and a Kitner with excellent sparing of the bladder neck fibers. There was a large middle lobe that had to be dissected out posteriorly. The urethra was divided and the prostate was sent in formalin to pathology with its sleeve vesicles. The pelvis was irrigated with 800 cc of warm water in the pelvis packed off. Attention was turned to the bladder neck where the bladder mucosa was everted using interrupted 4-0 chromic stitches. The pelvis was then inspected and there was a small amount of bleeding from the left side near the ampulla of the vas. This was cauterized and then secured with a surgical staple.  2-0 Monocryl sutures were placed at the urethral remnant at the 1:00, 11:00, 5 and 7:00 positions and then at the appropriate position at the bladder neck. The kidney rest was taken down and the table taken out of flex and these 4 sutures tied creating the vesicourethral anastomosis. This was done over a new Sandra catheter was inserted carefully into the bladder and 15 cc of saline was placed in the Sandra balloon. The Sandra was irrigated with clear returns and a Cager clamp was placed on the Sandra balloon port. The Sandra was placed to close gravity drainage and would later be secured to the patient's thigh with a Velcro strap without traction. 2 #15 round Jef-Alejo drains were placed through small stab wounds in each lower quadrant to drain the perivesical space on each side and these were secured to the skin with a 2-0 silk suture and later placed to grenade suction. The incision was then closed in several layers. The anterior rectus fascia was closed with interrupted figure-of-eight 0 Vicryl sutures. Camper's and Vania's was irrigated with a gentamicin solution. Vania's was closed with a 30 plain stitch and the skin reapproximated with a subcuticular stitch using 4-0 Monocryl. Sterile dressings were applied and the patient went to the recovery  room in stable condition. Operative time was just over 2 hours and estimated blood loss 400 cc.

## 2017-07-11 NOTE — ANESTHESIA PREPROCEDURE EVALUATION
Anesthesia Evaluation     . Pt has had prior anesthetic. Type: General    No history of anesthetic complications          ROS/MED HX    ENT/Pulmonary:  - neg pulmonary ROS     Neurologic:  - neg neurologic ROS     Cardiovascular:     (+) Dyslipidemia, ----. : . . . :. .       METS/Exercise Tolerance:     Hematologic:  - neg hematologic  ROS       Musculoskeletal:  - neg musculoskeletal ROS       GI/Hepatic:  - neg GI/hepatic ROS       Renal/Genitourinary:     (+) BPH, Other Renal/ Genitourinary, prostate cancer      Endo:  - neg endo ROS       Psychiatric:  - neg psychiatric ROS       Infectious Disease:  - neg infectious disease ROS       Malignancy:      - no malignancy   Other:    - neg other ROS                 Physical Exam  Normal systems: cardiovascular, pulmonary and dental    Airway   Mallampati: II  TM distance: >3 FB  Neck ROM: full    Dental     Cardiovascular       Pulmonary                     Anesthesia Plan      History & Physical Review  History and physical reviewed and following examination; no interval change.    ASA Status:  1 .    NPO Status:  > 8 hours    Plan for General and ETT with Intravenous and Propofol induction. Maintenance will be Balanced.    PONV prophylaxis:  Ondansetron (or other 5HT-3) and Dexamethasone or Solumedrol       Postoperative Care  Postoperative pain management:  IV analgesics and Oral pain medications.      Consents  Anesthetic plan, risks, benefits and alternatives discussed with:  Patient or representative and Patient..                          .

## 2017-07-12 LAB
ANION GAP SERPL CALCULATED.3IONS-SCNC: 6 MMOL/L (ref 3–14)
BUN SERPL-MCNC: 19 MG/DL (ref 7–30)
CALCIUM SERPL-MCNC: 8.3 MG/DL (ref 8.5–10.1)
CHLORIDE SERPL-SCNC: 105 MMOL/L (ref 94–109)
CO2 SERPL-SCNC: 29 MMOL/L (ref 20–32)
CREAT SERPL-MCNC: 0.8 MG/DL (ref 0.66–1.25)
ERYTHROCYTE [DISTWIDTH] IN BLOOD BY AUTOMATED COUNT: 12.8 % (ref 10–15)
GFR SERPL CREATININE-BSD FRML MDRD: ABNORMAL ML/MIN/1.7M2
GLUCOSE SERPL-MCNC: 107 MG/DL (ref 70–99)
HCT VFR BLD AUTO: 37.8 % (ref 40–53)
HGB BLD-MCNC: 12.5 G/DL (ref 13.3–17.7)
MCH RBC QN AUTO: 31.6 PG (ref 26.5–33)
MCHC RBC AUTO-ENTMCNC: 33.1 G/DL (ref 31.5–36.5)
MCV RBC AUTO: 96 FL (ref 78–100)
PLATELET # BLD AUTO: 205 10E9/L (ref 150–450)
POTASSIUM SERPL-SCNC: 4.1 MMOL/L (ref 3.4–5.3)
RBC # BLD AUTO: 3.95 10E12/L (ref 4.4–5.9)
SODIUM SERPL-SCNC: 140 MMOL/L (ref 133–144)
WBC # BLD AUTO: 9.2 10E9/L (ref 4–11)

## 2017-07-12 PROCEDURE — 25000132 ZZH RX MED GY IP 250 OP 250 PS 637: Performed by: UROLOGY

## 2017-07-12 PROCEDURE — 12000007 ZZH R&B INTERMEDIATE

## 2017-07-12 PROCEDURE — 80048 BASIC METABOLIC PNL TOTAL CA: CPT | Performed by: UROLOGY

## 2017-07-12 PROCEDURE — 36415 COLL VENOUS BLD VENIPUNCTURE: CPT | Performed by: UROLOGY

## 2017-07-12 PROCEDURE — S5010 5% DEXTROSE AND 0.45% SALINE: HCPCS | Performed by: UROLOGY

## 2017-07-12 PROCEDURE — 25000128 H RX IP 250 OP 636: Performed by: UROLOGY

## 2017-07-12 PROCEDURE — 25800025 ZZH RX 258: Performed by: UROLOGY

## 2017-07-12 PROCEDURE — 85027 COMPLETE CBC AUTOMATED: CPT | Performed by: UROLOGY

## 2017-07-12 RX ORDER — HEPARIN SODIUM 5000 [USP'U]/.5ML
5000 INJECTION, SOLUTION INTRAVENOUS; SUBCUTANEOUS EVERY 12 HOURS
Status: DISCONTINUED | OUTPATIENT
Start: 2017-07-12 | End: 2017-07-13 | Stop reason: HOSPADM

## 2017-07-12 RX ORDER — TOLTERODINE 4 MG/1
4 CAPSULE, EXTENDED RELEASE ORAL DAILY
Status: DISCONTINUED | OUTPATIENT
Start: 2017-07-12 | End: 2017-07-13 | Stop reason: HOSPADM

## 2017-07-12 RX ORDER — OXYCODONE HYDROCHLORIDE 5 MG/1
5-10 TABLET ORAL
Qty: 20 TABLET | Refills: 0 | Status: SHIPPED | OUTPATIENT
Start: 2017-07-12 | End: 2017-07-25

## 2017-07-12 RX ORDER — CIPROFLOXACIN 250 MG/1
250 TABLET, FILM COATED ORAL DAILY
Qty: 14 TABLET | Refills: 0 | Status: SHIPPED | OUTPATIENT
Start: 2017-07-12 | End: 2017-11-22

## 2017-07-12 RX ORDER — DOCUSATE SODIUM 100 MG/1
100 CAPSULE, LIQUID FILLED ORAL 2 TIMES DAILY
Qty: 30 CAPSULE | Refills: 0 | Status: SHIPPED | OUTPATIENT
Start: 2017-07-12 | End: 2017-11-22

## 2017-07-12 RX ADMIN — HEPARIN SODIUM 5000 UNITS: 10000 INJECTION, SOLUTION INTRAVENOUS; SUBCUTANEOUS at 11:22

## 2017-07-12 RX ADMIN — HEPARIN SODIUM 5000 UNITS: 10000 INJECTION, SOLUTION INTRAVENOUS; SUBCUTANEOUS at 21:04

## 2017-07-12 RX ADMIN — DOCUSATE SODIUM 100 MG: 100 CAPSULE, LIQUID FILLED ORAL at 08:50

## 2017-07-12 RX ADMIN — Medication 2 LOZENGE: at 00:09

## 2017-07-12 RX ADMIN — KETOROLAC TROMETHAMINE 30 MG: 30 INJECTION, SOLUTION INTRAMUSCULAR at 00:00

## 2017-07-12 RX ADMIN — KETOROLAC TROMETHAMINE 30 MG: 30 INJECTION, SOLUTION INTRAMUSCULAR at 12:32

## 2017-07-12 RX ADMIN — KETOROLAC TROMETHAMINE 30 MG: 30 INJECTION, SOLUTION INTRAMUSCULAR at 06:26

## 2017-07-12 RX ADMIN — ACETAMINOPHEN 975 MG: 325 TABLET, FILM COATED ORAL at 11:22

## 2017-07-12 RX ADMIN — BACITRACIN ZINC, NEOMYCIN, POLYMYXIN B: 400; 3.5; 5 OINTMENT TOPICAL at 20:38

## 2017-07-12 RX ADMIN — TOLTERODINE TARTRATE 4 MG: 4 CAPSULE, EXTENDED RELEASE ORAL at 08:51

## 2017-07-12 RX ADMIN — OXYCODONE HYDROCHLORIDE 5 MG: 5 TABLET ORAL at 21:00

## 2017-07-12 RX ADMIN — DEXTROSE AND SODIUM CHLORIDE: 5; 450 INJECTION, SOLUTION INTRAVENOUS at 11:22

## 2017-07-12 RX ADMIN — ACETAMINOPHEN 975 MG: 325 TABLET, FILM COATED ORAL at 18:14

## 2017-07-12 RX ADMIN — SODIUM CHLORIDE 1000 ML: 9 INJECTION, SOLUTION INTRAVENOUS at 03:54

## 2017-07-12 RX ADMIN — ACETAMINOPHEN 975 MG: 325 TABLET, FILM COATED ORAL at 01:58

## 2017-07-12 RX ADMIN — BACITRACIN ZINC, NEOMYCIN, POLYMYXIN B: 400; 3.5; 5 OINTMENT TOPICAL at 08:52

## 2017-07-12 ASSESSMENT — PAIN DESCRIPTION - DESCRIPTORS
DESCRIPTORS: CRAMPING
DESCRIPTORS: DISCOMFORT
DESCRIPTORS: DULL

## 2017-07-12 NOTE — DISCHARGE SUMMARY
[facility] Discharge Summary: Urology    Yogi Moreland MRN# [id number]   Age: 56 year old YOB: 1960     Date of Admission:  7/11/2017  Date of Discharge::  7/13/2017  Admitting Physician:  Chuy Reddy MD  Discharge Physician:  Cesia Suero PA-C, OBDULIA, Deon Lopez MD           Admission Diagnoses:      Prostate cancer (H)  Acute post-operative pain          Discharge Diagnosis:   Same          Procedures:   RRP and left obturator LND          Medications Prior to Admission:     Prescriptions Prior to Admission   Medication Sig Dispense Refill Last Dose     Ibuprofen (ADVIL PO) Take 400 mg by mouth every 6 hours as needed for moderate pain   7/6/2017             Discharge Medications:     Current Discharge Medication List      START taking these medications    Details   oxyCODONE (ROXICODONE) 5 MG IR tablet Take 1-2 tablets (5-10 mg) by mouth every 3 hours as needed for moderate to severe pain  Qty: 20 tablet, Refills: 0    Associated Diagnoses: Acute post-operative pain      docusate sodium (COLACE) 100 MG capsule Take 1 capsule (100 mg) by mouth 2 times daily  Qty: 30 capsule, Refills: 0    Comments: Hold for diarrhea  Associated Diagnoses: Prostate cancer (H)      ciprofloxacin (CIPRO) 250 MG tablet Take 1 tablet (250 mg) by mouth daily  Qty: 14 tablet, Refills: 0    Associated Diagnoses: Prostate cancer (H)         CONTINUE these medications which have NOT CHANGED    Details   Ibuprofen (ADVIL PO) Take 400 mg by mouth every 6 hours as needed for moderate pain                   Consultations:     None          Hospital Course:     The patient underwent the above procedure and tolerated this well.  Transitioned to oral narcotics on POD1. Uncomplicated post operative course. Had adequate pain control, ambulating and tolerating regular diet at the time of discharge.  Path pend at time of discharge.          Discharge Instructions and Follow-Up:   Discharge diet: Regular   Discharge  activity: No lifting >10lbs    Discharge follow-up: Dr. Reddy on 7/25    Wound care: Ice to area for comfort  Keep wound clean and dry           Discharge Disposition:   Discharged to home        Cesia Suero PA-C  [facility]  Cell: [phone] (text or call, Mon-Wed until 5pm)    Deon Lopez MD, PGY-5  Urology Resident / [facility]  [phone] pager

## 2017-07-12 NOTE — PLAN OF CARE
Problem: Individualization  Goal: Patient Preferences  Outcome: No Change  Pt up to BR with SBA. Unable to pass gas but +BS and reports feeling hungry. Denies nausea, berto clears. C/o lower abdominal pressure. Sandra output is very dark bloody. Small clots noted but tubing is patent.   Declines narcotics. Toradol and tylenol scheduled. Flat affect noted, very apprehensive of movement and anticipatory of pain.   Uneventful night, calls appropriately and able to verbalizes needs.

## 2017-07-12 NOTE — PLAN OF CARE
Problem: Goal Outcome Summary  Goal: Goal Outcome Summary  Outcome: No Change  Pt remains admitted for prostatectomy. Pt had procedure done during the shift. EBL during procedure 350, hemo level 13.6. After returning to the floor, pt reported abdominal pain, gave tordol x1, dilaudid x2, and tylenol x1. Pt reported no nausea. Clear liquid diet, tolerated well. Sandra cath in place, output bright red, 350 mL. Right and left TEE drains, R drained 10 mL bloody output, L drained 30 mL bloody output. On 2L O2. Did not get up or dangle during shift. Will continue to monitor.

## 2017-07-12 NOTE — PROGRESS NOTES
[facility] Urology Progress Note          Assessment and Plan:   Active Problems:    Prostate cancer (H)    Assessment: POD# 1 RRP and left obturator LND, doing well    Plan:   Dilaudid IV prn and Oxy prn  Ambulate, IS  Sandra cares, leg bag and teaching prior to d/c, home on Cipro 250mg daily with Sandra in place.    Aim for home tomorrow. D/C orders done.   Tues 7/25 ely with Dr. Reddy.    Cesia Suero PA-C  [facility]  Cell: [phone] (text or call until 5pm, Mon-Wed & Fri)               Interval History:   doing well; no cp, sob, n/v/d. Pain controlled, passing flatus. Sandra with candice urine, patent.              Review of Systems:   The 5 point Review of Systems is negative other than noted in the HPI             Medications:     Current Facility-Administered Medications Ordered in Epic   Medication Dose Route Frequency Last Rate Last Dose     tolterodine (DETROL LA) 24 hr capsule 4 mg  4 mg Oral Daily   4 mg at 07/12/17 0851     dextrose 5% and 0.45% NaCl infusion   Intravenous Continuous 50 mL/hr at 07/12/17 1122       heparin sodium PF injection 5,000 Units  5,000 Units Subcutaneous Q12H   5,000 Units at 07/12/17 1122     lidocaine 1 % 1 mL  1 mL Other Q1H PRN         lidocaine (LMX4) kit   Topical Q1H PRN         sodium chloride (PF) 0.9% PF flush 3 mL  3 mL Intracatheter Q1H PRN         sodium chloride (PF) 0.9% PF flush 3 mL  3 mL Intracatheter Q8H         naloxone (NARCAN) injection 0.1-0.4 mg  0.1-0.4 mg Intravenous Q2 Min PRN         neomycin-bacitracin-polymyxin (NEOSPORIN) ointment   Topical BID         benzocaine-menthol (CHLORASEPTIC) 6-10 MG lozenge 1-2 lozenge  1-2 lozenge Buccal Q1H PRN   2 lozenge at 07/12/17 0009     HYDROmorphone (DILAUDID) injection 0.2-0.4 mg  0.2-0.4 mg Intravenous Q2H PRN   0.2 mg at 07/11/17 8223     acetaminophen (TYLENOL) tablet 975 mg  975 mg Oral Q8H   975 mg at 07/12/17 1122     oxyCODONE (ROXICODONE) IR tablet 5-10 mg  5-10 mg Oral Q3H PRN          ondansetron (ZOFRAN-ODT) ODT tab 4 mg  4 mg Oral Q6H PRN        Or     ondansetron (ZOFRAN) injection 4 mg  4 mg Intravenous Q6H PRN         prochlorperazine (COMPAZINE) injection 5-10 mg  5-10 mg Intravenous Q6H PRN        Or     prochlorperazine (COMPAZINE) tablet 5-10 mg  5-10 mg Oral Q6H PRN         docusate sodium (COLACE) capsule 100 mg  100 mg Oral BID   100 mg at 07/12/17 0850     Current Outpatient Prescriptions Ordered in Epic   Medication     oxyCODONE (ROXICODONE) 5 MG IR tablet     docusate sodium (COLACE) 100 MG capsule     ciprofloxacin (CIPRO) 250 MG tablet                  Physical Exam:   Vitals were reviewed  Patient Vitals for the past 8 hrs:   BP Temp Temp src Heart Rate Resp SpO2   07/12/17 0754 111/59 97.6  F (36.4  C) Axillary 85 18 97 %     GEN: NAD, lying in bed  HEENT: EOMI  NECK: Supple  ABD: soft, NTTP, no tympany dressing dry  EXT: No LE edema  : Sandra patent, candice, no clots.           Data:   No results found for: NTBNPI, NTBNP  Lab Results   Component Value Date    WBC 9.2 07/12/2017    WBC 14.4 (H) 07/11/2017    WBC 7.2 06/14/2017    HGB 12.5 (L) 07/12/2017    HGB 13.6 07/11/2017    HGB 15.9 06/14/2017    HCT 37.8 (L) 07/12/2017    HCT 41.5 07/11/2017    HCT 46.7 06/14/2017    MCV 96 07/12/2017    MCV 94 07/11/2017    MCV 94 06/14/2017     07/12/2017     07/11/2017     06/14/2017     Lab Results   Component Value Date    INR 1.01 09/04/2016       I have seen and examined the patient and I agree with the above findings and plan

## 2017-07-12 NOTE — PROGRESS NOTES
Urology    No acute events overnight  Ate breakfast, no nausea or vomiting   Planning to get out of bed this morning  Passing flatus, one small bowel movement as well  Pain well controlled    Exam  /59  Temp 97.6  F (36.4  C) (Axillary)  Resp 18  Ht 1.829 m (6')  Wt 80.3 kg (177 lb)  SpO2 97%  BMI 24.01 kg/m2  No acute distress  Unlabored breathing  Abd soft, nt/appropriately tender. Incisions clean with Steri Strips.   TEE drains SSD bilaterally.   Urethral catheter secured to right leg, urine watermelon    Labs  Cr 0.80  Hgb 12.5 (13.6)    Assessment/Plan  56 year old male POD#1 s/p open RRP. Doing well.  - Continue oxycodone, scheduled acetaminophen and ketorolac, prn IV hydromorphone for pain.   - IS/pulmonary toilet while awake  - Regular diet. MIVF@ 50 cc/hr. Saline lock when tolerating appropriate PO. Stool softeners.   - Hgb stable. Will plan on starting SQH prophylaxis today. SCDs while in bed. Ambulate at least QID today.   - Continue TEE drains until discharge (to be removed once outputs are <50 cc/day).   - Dispo: Likely discharge tomorrow.    Discussed with Dr. Reddy.    eDon Lopez MD, PGY-5  Urology Resident / [facility]

## 2017-07-12 NOTE — PROGRESS NOTES
[facility] Urology Progress Note          Assessment and Plan:   Active Problems:    Prostate cancer (H)    Assessment: POD# 1 RRP and left obturator LND, doing well    Plan:   Dilaudid IV prn and Oxy prn  Ambulate, IS  Sandra cares, leg bag and teaching prior to d/c, home on Cipro 250mg daily with Sandra in place.    Aim for home tomorrow. D/C orders done.   Tues 7/25 ely with Dr. Reddy.    Cesia Suero PA-C  [facility]  Cell: [phone] (text or call until 5pm, Mon-Wed & Fri)               Interval History:   doing well; no cp, sob, n/v/d. Pain controlled, passing flatus. Sandra with candice urine, patent.              Review of Systems:   The 5 point Review of Systems is negative other than noted in the HPI             Medications:     Current Facility-Administered Medications Ordered in Epic   Medication Dose Route Frequency Last Rate Last Dose     tolterodine (DETROL LA) 24 hr capsule 4 mg  4 mg Oral Daily         lidocaine 1 % 1 mL  1 mL Other Q1H PRN         lidocaine (LMX4) kit   Topical Q1H PRN         sodium chloride (PF) 0.9% PF flush 3 mL  3 mL Intracatheter Q1H PRN         sodium chloride (PF) 0.9% PF flush 3 mL  3 mL Intracatheter Q8H         naloxone (NARCAN) injection 0.1-0.4 mg  0.1-0.4 mg Intravenous Q2 Min PRN         0.9% sodium chloride infusion   Intravenous Continuous 100 mL/hr at 07/12/17 0354 1,000 mL at 07/12/17 0354     neomycin-bacitracin-polymyxin (NEOSPORIN) ointment   Topical BID         benzocaine-menthol (CHLORASEPTIC) 6-10 MG lozenge 1-2 lozenge  1-2 lozenge Buccal Q1H PRN   2 lozenge at 07/12/17 0009     HYDROmorphone (DILAUDID) injection 0.2-0.4 mg  0.2-0.4 mg Intravenous Q2H PRN   0.2 mg at 07/11/17 2143     acetaminophen (TYLENOL) tablet 975 mg  975 mg Oral Q8H   975 mg at 07/12/17 0158     oxyCODONE (ROXICODONE) IR tablet 5-10 mg  5-10 mg Oral Q3H PRN         ketorolac (TORADOL) injection 30 mg  30 mg Intravenous Q6H   30 mg at 07/12/17 4082      ondansetron (ZOFRAN-ODT) ODT tab 4 mg  4 mg Oral Q6H PRN        Or     ondansetron (ZOFRAN) injection 4 mg  4 mg Intravenous Q6H PRN         prochlorperazine (COMPAZINE) injection 5-10 mg  5-10 mg Intravenous Q6H PRN        Or     prochlorperazine (COMPAZINE) tablet 5-10 mg  5-10 mg Oral Q6H PRN         docusate sodium (COLACE) capsule 100 mg  100 mg Oral BID   100 mg at 07/11/17 2134     Current Outpatient Prescriptions Ordered in Epic   Medication     HYDROcodone-acetaminophen (NORCO) 5-325 MG per tablet                  Physical Exam:   Vitals were reviewed  Patient Vitals for the past 8 hrs:   BP Temp Temp src Heart Rate Resp SpO2   07/12/17 0754 111/59 97.6  F (36.4  C) Axillary 85 18 97 %   07/12/17 0349 105/55 97.2  F (36.2  C) Oral 68 16 100 %     GEN: NAD, lying in bed  HEENT: EOMI  NECK: Supple  ABD: soft, NTTP, no tympany dressing dry  EXT: No LE edema  : Sandra patent, candice, no clots.           Data:   No results found for: NTBNPI, NTBNP  Lab Results   Component Value Date    WBC 9.2 07/12/2017    WBC 14.4 (H) 07/11/2017    WBC 7.2 06/14/2017    HGB 12.5 (L) 07/12/2017    HGB 13.6 07/11/2017    HGB 15.9 06/14/2017    HCT 37.8 (L) 07/12/2017    HCT 41.5 07/11/2017    HCT 46.7 06/14/2017    MCV 96 07/12/2017    MCV 94 07/11/2017    MCV 94 06/14/2017     07/12/2017     07/11/2017     06/14/2017     Lab Results   Component Value Date    INR 1.01 09/04/2016

## 2017-07-13 VITALS
WEIGHT: 177.3 LBS | BODY MASS INDEX: 24.01 KG/M2 | DIASTOLIC BLOOD PRESSURE: 61 MMHG | SYSTOLIC BLOOD PRESSURE: 108 MMHG | OXYGEN SATURATION: 98 % | RESPIRATION RATE: 18 BRPM | HEIGHT: 72 IN | TEMPERATURE: 98.6 F

## 2017-07-13 LAB — COPATH REPORT: NORMAL

## 2017-07-13 PROCEDURE — S5010 5% DEXTROSE AND 0.45% SALINE: HCPCS | Performed by: UROLOGY

## 2017-07-13 PROCEDURE — 25000128 H RX IP 250 OP 636: Performed by: UROLOGY

## 2017-07-13 PROCEDURE — 25800025 ZZH RX 258: Performed by: UROLOGY

## 2017-07-13 PROCEDURE — 25000132 ZZH RX MED GY IP 250 OP 250 PS 637: Performed by: UROLOGY

## 2017-07-13 RX ADMIN — DEXTROSE AND SODIUM CHLORIDE: 5; 450 INJECTION, SOLUTION INTRAVENOUS at 06:56

## 2017-07-13 RX ADMIN — TOLTERODINE TARTRATE 4 MG: 4 CAPSULE, EXTENDED RELEASE ORAL at 08:47

## 2017-07-13 RX ADMIN — ONDANSETRON 4 MG: 2 INJECTION INTRAMUSCULAR; INTRAVENOUS at 02:09

## 2017-07-13 RX ADMIN — HEPARIN SODIUM 5000 UNITS: 10000 INJECTION, SOLUTION INTRAVENOUS; SUBCUTANEOUS at 10:13

## 2017-07-13 RX ADMIN — BACITRACIN ZINC, NEOMYCIN, POLYMYXIN B: 400; 3.5; 5 OINTMENT TOPICAL at 08:49

## 2017-07-13 RX ADMIN — DOCUSATE SODIUM 100 MG: 100 CAPSULE, LIQUID FILLED ORAL at 08:47

## 2017-07-13 RX ADMIN — ACETAMINOPHEN 975 MG: 325 TABLET, FILM COATED ORAL at 01:46

## 2017-07-13 RX ADMIN — ACETAMINOPHEN 975 MG: 325 TABLET, FILM COATED ORAL at 10:13

## 2017-07-13 ASSESSMENT — PAIN DESCRIPTION - DESCRIPTORS
DESCRIPTORS: HEAVINESS
DESCRIPTORS: DULL;DISCOMFORT

## 2017-07-13 NOTE — PROGRESS NOTES
Urology    Some lightheadedness overnight with 'warm spells'  Tolerating regular diet  Ambulated several times  Pain well controlled    Exam  /66 (BP Location: Right arm)  Temp 99  F (37.2  C) (Oral)  Resp 20  Ht 1.829 m (6')  Wt 80.4 kg (177 lb 4.8 oz)  SpO2 96%  BMI 24.05 kg/m2  No acute distress  Unlabored breathing  Abd soft, nt/appropriately tender. Incisions clean with Steri Strips.   TEE drains SSD bilaterally. Removed at bedside, dressings applied.  Urethral catheter secured to right leg, urine sunita    Assessment/Plan  56 year old male POD#2 s/p open RRP. Doing well.  - Continue oxycodone, scheduled acetaminophen  - IS/pulmonary toilet while awake  - Regular diet. Saline lock IV. Stool softeners.  - Discharge home today with follow-up in 2 weeks for catheter removal. Ciprofloxacin while catheter is in place.    Seen and examined with Dr. Denson.    Deon Lopez MD, PGY-5  Urology Resident / [facility]

## 2017-07-13 NOTE — PLAN OF CARE
Problem: Goal Outcome Summary  Goal: Goal Outcome Summary  Outcome: No Change  Pt remains hospitalized for: POD #1 prostatectomy with bilat. PND    Ambulatory Status:  Pt up standby. Ambulated in halls x2, up to chair for meal  Orientation: a&o  VS:  VSS  Pain:  C/o abd pain. Oxycodone given and scheduled tylenol   Resp: LS clear.   GI:  denies nausea.  fair appetite, on regular diet. +BS.  Passing flatus.  Last BM 7/12.  : oh patent with red urine   Skin:  Midline with dried drainage on steri-strips. TEE dressings changed. L drain is leaking at the site   Disposition:  Possible d/c tomorrow

## 2017-07-13 NOTE — PLAN OF CARE
Problem: Goal Outcome Summary  Goal: Goal Outcome Summary  Outcome: No Change  Pain managed with scheduled tylenol, pt having nausea and sweating, zofran given with some relief. up independently in room.

## 2017-07-13 NOTE — DISCHARGE INSTRUCTIONS
Clean around catheter tubing two times daily and apply bacitracin as needed.     Avoid straining with bowel movements.     Rinse catheter bag with vinegar and water, clean ends of tubing with alcohol wipes.

## 2017-07-13 NOTE — PLAN OF CARE
Problem: Goal Outcome Summary  Goal: Goal Outcome Summary  Outcome: Adequate for Discharge Date Met:  07/13/17  Pt to discharge to home with support of wife for oh catheter cares  Provided with catheter teaching and supplies including alcohol wipes and oh cleansing wipes  Pt showered dressing to TEE sites changed, more drainage on the right side  Will be sent with Oxycodone Cipro, Colace  Follow up with Dr. Reddy on 7/25/17

## 2017-07-14 ENCOUNTER — TELEPHONE (OUTPATIENT)
Dept: FAMILY MEDICINE | Facility: CLINIC | Age: 57
End: 2017-07-14

## 2017-07-14 ENCOUNTER — TELEPHONE (OUTPATIENT)
Dept: UROLOGY | Facility: CLINIC | Age: 57
End: 2017-07-14

## 2017-07-14 NOTE — TELEPHONE ENCOUNTER
Wife Karina is calling with questions and concerns, Was discharged yesterday after surgery. He has not had a  BM as of yet on stool softer and passing gas. Has increase of blood and urine bypassing the urethra when trying to have a BM ot when standing up from sitting position. Urine is draining  In bag however is gross hematuria. Yogi is not taking any narcotics however has been taking 2 Advil every 6 hours. Instructed Karina to stop the Advil and use Tylenol. Avoid straining to have BM and add Miralax daily . Informed Karina that the bladder spasm will most likely get better if he is able to move his bowels. Instructed her to call office with any other questions or concerns Informed her that after hours we do have a MD on call.. Dr Denson was informed of the hematuria and the use of Advil  . Per Dr Denson begin Miralax  As instructed. Suri Santiago LPN

## 2017-07-17 NOTE — TELEPHONE ENCOUNTER
ED / Discharge Outreach Protocol    Patient Contact    Attempt # 1    Was call answered?  No.  Left message on voicemail with information to call me back.    Cary Huitron RN, BS  Clinical Nurse Triage.

## 2017-07-18 DIAGNOSIS — N32.89 BLADDER SPASMS: Primary | ICD-10-CM

## 2017-07-18 RX ORDER — OXYBUTYNIN CHLORIDE 10 MG/1
10 TABLET, EXTENDED RELEASE ORAL DAILY
Qty: 8 TABLET | Refills: 0 | Status: SHIPPED | OUTPATIENT
Start: 2017-07-18 | End: 2017-11-22

## 2017-07-25 ENCOUNTER — OFFICE VISIT (OUTPATIENT)
Dept: UROLOGY | Facility: CLINIC | Age: 57
End: 2017-07-25
Payer: COMMERCIAL

## 2017-07-25 VITALS
HEIGHT: 72 IN | BODY MASS INDEX: 22.62 KG/M2 | HEART RATE: 78 BPM | DIASTOLIC BLOOD PRESSURE: 60 MMHG | SYSTOLIC BLOOD PRESSURE: 110 MMHG | WEIGHT: 167 LBS

## 2017-07-25 DIAGNOSIS — C61 MALIGNANT NEOPLASM OF PROSTATE (H): Primary | ICD-10-CM

## 2017-07-25 PROCEDURE — 99024 POSTOP FOLLOW-UP VISIT: CPT | Performed by: UROLOGY

## 2017-07-25 RX ORDER — OXYCODONE HYDROCHLORIDE 5 MG/1
TABLET ORAL
COMMUNITY
Start: 2017-07-13 | End: 2017-11-22

## 2017-07-25 ASSESSMENT — PAIN SCALES - GENERAL: PAINLEVEL: MODERATE PAIN (4)

## 2017-07-25 NOTE — MR AVS SNAPSHOT
After Visit Summary   7/25/2017    Yogi Moreland    MRN: [id number]           Patient Information     Date Of Birth          1960        Visit Information        Provider Department      7/25/2017 9:00 AM Chuy Reddy MD [facility] [place]        Today's Diagnoses     Malignant neoplasm of prostate (H)    -  1       Follow-ups after your visit        Follow-up notes from your care team     Return in about 4 weeks (around 8/22/2017) for PSA.      Future tests that were ordered for you today     Open Future Orders        Priority Expected Expires Ordered    PSA tumor marker [[id number]] Routine  7/25/2018 7/25/2017            Who to contact     If you have questions or need follow up information about today's clinic visit or your schedule please contact [facility] directly at [phone].  Normal or non-critical lab and imaging results will be communicated to you by MyChart, letter or phone within 4 business days after the clinic has received the results. If you do not hear from us within 7 days, please contact the clinic through FamilyFindshart or phone. If you have a critical or abnormal lab result, we will notify you by phone as soon as possible.  Submit refill requests through [employer] or call your pharmacy and they will forward the refill request to us. Please allow 3 business days for your refill to be completed.          Additional Information About Your Visit        MyChart Information     [employer] gives you secure access to your electronic health record. If you see a primary care provider, you can also send messages to your care team and make appointments. If you have questions, please call your primary care clinic.  If you do not have a primary care provider, please call [phone] and they will assist you.        Care EveryWhere ID     This is your Care EveryWhere ID. This could be used by other organizations  to access your [place] medical records  [id number]        Your Vitals Were     Pulse Height BMI (Body Mass Index)             78 1.829 m (6') 22.65 kg/m2          Blood Pressure from Last 3 Encounters:   07/25/17 110/60   07/13/17 108/61   06/14/17 116/73    Weight from Last 3 Encounters:   07/25/17 75.8 kg (167 lb)   07/13/17 80.4 kg (177 lb 4.8 oz)   06/14/17 77.4 kg (170 lb 11.2 oz)                 Today's Medication Changes          These changes are accurate as of: 7/25/17  9:52 AM.  If you have any questions, ask your nurse or doctor.               These medicines have changed or have updated prescriptions.        Dose/Directions    oxyCODONE 5 MG IR tablet   Commonly known as:  ROXICODONE   This may have changed:  Another medication with the same name was removed. Continue taking this medication, and follow the directions you see here.   Changed by:  Chuy Reddy MD        Refills:  0                Primary Care Provider Office Phone # Fax #    Randolph Solomon Mueller [phone]       [address] [address]        Equal Access to Services     MAYDA TEJADA AH: Hadii aad ku hadasho Soomaali, waaxda luqadaha, qaybta kaalmada adeegyada, romain shipman. So [facility] [phone].    ATENCIÓN: Si habla español, tiene a solis disposición servicios gratuitos de asistencia lingüística. Llame al [phone].    We comply with applicable federal civil rights laws and [place] laws. We do not discriminate on the basis of race, color, national origin, age, disability sex, sexual orientation or gender identity.            Thank you!     Thank you for choosing [facility] [place]  for your care. Our goal is always to provide you with excellent care. Hearing back from our patients is one way we can continue to improve our services. Please take a few minutes to complete the written survey that you  may receive in the mail after your visit with us. Thank you!             Your Updated Medication List - Protect others around you: Learn how to safely use, store and throw away your medicines at www.disposemymeds.org.          This list is accurate as of: 7/25/17  9:52 AM.  Always use your most recent med list.                   Brand Name Dispense Instructions for use Diagnosis    ADVIL PO      Take 400 mg by mouth every 6 hours as needed for moderate pain        ciprofloxacin 250 MG tablet    CIPRO    14 tablet    Take 1 tablet (250 mg) by mouth daily    Prostate cancer (H)       docusate sodium 100 MG capsule    COLACE    30 capsule    Take 1 capsule (100 mg) by mouth 2 times daily    Prostate cancer (H)       oxybutynin 10 MG 24 hr tablet    DITROPAN XL    8 tablet    Take 1 tablet (10 mg) by mouth daily    Bladder spasms       oxyCODONE 5 MG IR tablet    ROXICODONE

## 2017-07-25 NOTE — PROGRESS NOTES
Yogi Moreland is a 57-year-old male who underwent radical prostatectomy with lymph node dissection 2 weeks ago. His final pathology revealed grade 3+4 adenocarcinoma with negative surgical margins and negative lymph nodes. He returns today for catheter removal. His incision is healing nicely. His urine is clear and his Sandra catheter was removed. Kegel exercises discussed.  Assessment: Adenocarcinoma of the prostate-postop doing well  Plan: See me in 4 weeks with PSA, daily kegel exercises

## 2017-07-25 NOTE — LETTER
7/25/2017       RE: Yogi Moreland  [address] [address] [facility] [address]     Dear Colleague,    Thank you for referring your patient, Yogi Moreland, to the [facility] [place] at [facility]. Please see a copy of my visit note below.    Yogi Moreland is a 57-year-old male who underwent radical prostatectomy with lymph node dissection 2 weeks ago. His final pathology revealed grade 3+4 adenocarcinoma with negative surgical margins and negative lymph nodes. He returns today for catheter removal. His incision is healing nicely. His urine is clear and his Sandra catheter was removed. Kegel exercises discussed.  Assessment: Adenocarcinoma of the prostate-postop doing well  Plan: See me in 4 weeks with PSA, daily kegel exercises    Again, thank you for allowing me to participate in the care of your patient.      Sincerely,    Chuy Reddy MD

## 2017-07-28 ENCOUNTER — TELEPHONE (OUTPATIENT)
Dept: UROLOGY | Facility: CLINIC | Age: 57
End: 2017-07-28

## 2017-07-28 NOTE — TELEPHONE ENCOUNTER
Pt calling and stating he is doing well post prostectomy.  Pt states he is still leaking.  Pt is wearing a pad.  Pt states when he walks his penis is getting irritated because the pad is rubbing on his penis.  Pt states he has been using bacitracin.  I suggested he readjust the pad and wear tighter underwear so see if less movement with the pad helps.  I also suggest that he stop the bacitracin and keep the area clean and dry.  Pt will call us back if needed.  SMITA Quiñones, CMA

## 2017-08-11 ENCOUNTER — MYC MEDICAL ADVICE (OUTPATIENT)
Dept: UROLOGY | Facility: CLINIC | Age: 57
End: 2017-08-11

## 2017-08-11 NOTE — TELEPHONE ENCOUNTER
Spoke with patient and he stated that he read the discharge instructions and there was an ointment that he went home with. He has been following the instructions he was given. I explained to him that he should keep area clean and dry. He will call us back next Monday if things are worsening or if he has any further questions. Angie Ham LPN

## 2017-08-21 DIAGNOSIS — C61 MALIGNANT NEOPLASM OF PROSTATE (H): ICD-10-CM

## 2017-08-21 LAB — PSA SERPL-MCNC: 0.01 UG/L (ref 0–4)

## 2017-08-21 PROCEDURE — 84153 ASSAY OF PSA TOTAL: CPT | Performed by: FAMILY MEDICINE

## 2017-08-21 PROCEDURE — 36415 COLL VENOUS BLD VENIPUNCTURE: CPT | Performed by: FAMILY MEDICINE

## 2017-08-24 ENCOUNTER — OFFICE VISIT (OUTPATIENT)
Dept: UROLOGY | Facility: CLINIC | Age: 57
End: 2017-08-24
Payer: COMMERCIAL

## 2017-08-24 VITALS
SYSTOLIC BLOOD PRESSURE: 120 MMHG | HEIGHT: 72 IN | BODY MASS INDEX: 22.62 KG/M2 | DIASTOLIC BLOOD PRESSURE: 76 MMHG | HEART RATE: 76 BPM | WEIGHT: 167 LBS

## 2017-08-24 DIAGNOSIS — C61 MALIGNANT NEOPLASM OF PROSTATE (H): Primary | ICD-10-CM

## 2017-08-24 PROCEDURE — 99024 POSTOP FOLLOW-UP VISIT: CPT | Performed by: UROLOGY

## 2017-08-24 ASSESSMENT — PAIN SCALES - GENERAL: PAINLEVEL: NO PAIN (0)

## 2017-08-24 NOTE — MR AVS SNAPSHOT
After Visit Summary   8/24/2017    Yogi Moreland    MRN: [id number]           Patient Information     Date Of Birth          1960        Visit Information        Provider Department      8/24/2017 11:00 AM Chuy Reddy MD [facility]        Today's Diagnoses     Malignant neoplasm of prostate (H)    -  1       Follow-ups after your visit        Follow-up notes from your care team     Return in about 3 months (around 11/24/2017) for PSA.      Your next 10 appointments already scheduled     Nov 28, 2017 10:40 AM CST   Return Visit with Chuy Reddy MD   [facility] ([facility] [place])    [address]  [address]  [facility] [address] [phone]              Future tests that were ordered for you today     Open Future Orders        Priority Expected Expires Ordered    PSA tumor marker [[id number]] Routine 11/24/2017 8/24/2018 8/24/2017            Who to contact     If you have questions or need follow up information about today's clinic visit or your schedule please contact [facility] directly at [phone].  Normal or non-critical lab and imaging results will be communicated to you by MyChart, letter or phone within 4 business days after the clinic has received the results. If you do not hear from us within 7 days, please contact the clinic through MyChart or phone. If you have a critical or abnormal lab result, we will notify you by phone as soon as possible.  Submit refill requests through [employer] or call your pharmacy and they will forward the refill request to us. Please allow 3 business days for your refill to be completed.          Additional Information About Your Visit        MyChart Information     [employer] gives you secure access to your electronic health record. If you see a primary care provider, you can also send  messages to your care team and make appointments. If you have questions, please call your primary care clinic.  If you do not have a primary care provider, please call [phone] and they will assist you.        Care EveryWhere ID     This is your Care EveryWhere ID. This could be used by other organizations to access your [place] medical records  [id number]        Your Vitals Were     Pulse Height BMI (Body Mass Index)             76 1.829 m (6') 22.65 kg/m2          Blood Pressure from Last 3 Encounters:   08/24/17 120/76   07/25/17 110/60   07/13/17 108/61    Weight from Last 3 Encounters:   08/24/17 75.8 kg (167 lb)   07/25/17 75.8 kg (167 lb)   07/13/17 80.4 kg (177 lb 4.8 oz)               Primary Care Provider Office Phone # Fax #    Randolph Solomon Mueller [phone] [address] [facility] [address]        Equal Access to Services     ROBERT TEJADA AH: Hadii aad ku hadasho Soomaali, waaxda luqadaha, qaybta kaalmada adeegyada, waxay idiin hayaan phil kharash leidy . So [facility] [phone].    ATENCIÓN: Si habla español, tiene a solis disposición servicios gratuitos de asistencia lingüística. [facility] [phone].    We comply with applicable federal civil rights laws and [place] laws. We do not discriminate on the basis of race, color, national origin, age, disability sex, sexual orientation or gender identity.            Thank you!     Thank you for choosing [facility] [place]  for your care. Our goal is always to provide you with excellent care. Hearing back from our patients is one way we can continue to improve our services. Please take a few minutes to complete the written survey that you may receive in the mail after your visit with us. Thank you!             Your Updated Medication List - Protect others around you: Learn how to safely use, store and throw away your medicines at www.disposemymeds.org.          This list is accurate as  of: 8/24/17 11:19 AM.  Always use your most recent med list.                   Brand Name Dispense Instructions for use Diagnosis    ADVIL PO      Take 400 mg by mouth every 6 hours as needed for moderate pain        ciprofloxacin 250 MG tablet    CIPRO    14 tablet    Take 1 tablet (250 mg) by mouth daily    Prostate cancer (H)       docusate sodium 100 MG capsule    COLACE    30 capsule    Take 1 capsule (100 mg) by mouth 2 times daily    Prostate cancer (H)       oxybutynin 10 MG 24 hr tablet    DITROPAN XL    8 tablet    Take 1 tablet (10 mg) by mouth daily    Bladder spasms       oxyCODONE 5 MG IR tablet    ROXICODONE

## 2017-08-24 NOTE — LETTER
8/24/2017       RE: Yogi Moreland  [address] [facility] [facility] [address]     Dear Colleague,    Thank you for referring your patient, Yogi Moreland, to the [facility] [place] at [facility]. Please see a copy of my visit note below.    Yogi Moreland is a 57-year-old male who underwent radical prostatectomy with bilateral pelvic lymph node dissection and bilateral nerve sparing 6 weeks ago. He is voiding with excellent urinary control. We discussed penile rehabilitation this morning.  Other past medical history reviewed  Exam: Alert and oriented, normocephalic, normal respirations. Incision nicely healed. Normal phallus and meatus  Assessment: Grade 3+4 adenocarcinoma the prostate with negative surgical margins and negative lymph nodes. PSA 0.01  Plan: See me in 3 months with PSA, daily Kegel exercises. Cialis 5 mg twice weekly-he understands his erections may take 12-18 months to return    Again, thank you for allowing me to participate in the care of your patient.      Sincerely,    Chuy Reddy MD

## 2017-08-24 NOTE — PROGRESS NOTES
Yogi Moreland is a 57-year-old male who underwent radical prostatectomy with bilateral pelvic lymph node dissection and bilateral nerve sparing 6 weeks ago. He is voiding with excellent urinary control. We discussed penile rehabilitation this morning.  Other past medical history reviewed  Exam: Alert and oriented, normocephalic, normal respirations. Incision nicely healed. Normal phallus and meatus  Assessment: Grade 3+4 adenocarcinoma the prostate with negative surgical margins and negative lymph nodes. PSA 0.01  Plan: See me in 3 months with PSA, daily Kegel exercises. Cialis 5 mg twice weekly-he understands his erections may take 12-18 months to return

## 2017-08-30 ENCOUNTER — TELEPHONE (OUTPATIENT)
Dept: UROLOGY | Facility: CLINIC | Age: 57
End: 2017-08-30

## 2017-08-30 DIAGNOSIS — C61 PROSTATE CANCER (H): Primary | ICD-10-CM

## 2017-08-30 RX ORDER — TADALAFIL 5 MG/1
5 TABLET ORAL DAILY
Qty: 30 TABLET | Refills: 6 | Status: SHIPPED | OUTPATIENT
Start: 2017-08-30 | End: 2019-02-12

## 2017-08-30 NOTE — TELEPHONE ENCOUNTER
Pt calling with questions about cilais. Why only 5mg? Will he eventually be able to ween off of it? Can he get 30 tabs instead of 20. Answered all his questions, sent in Rx for the 30 tabs instead of 20. Juana Hopper,A

## 2017-10-13 ENCOUNTER — TELEPHONE (OUTPATIENT)
Dept: INFUSION THERAPY | Facility: CLINIC | Age: 57
End: 2017-10-13

## 2017-11-22 ENCOUNTER — OFFICE VISIT (OUTPATIENT)
Dept: FAMILY MEDICINE | Facility: CLINIC | Age: 57
End: 2017-11-22
Payer: COMMERCIAL

## 2017-11-22 VITALS
TEMPERATURE: 97.6 F | DIASTOLIC BLOOD PRESSURE: 69 MMHG | WEIGHT: 167.3 LBS | RESPIRATION RATE: 12 BRPM | BODY MASS INDEX: 23.42 KG/M2 | HEIGHT: 71 IN | HEART RATE: 60 BPM | SYSTOLIC BLOOD PRESSURE: 108 MMHG | OXYGEN SATURATION: 99 %

## 2017-11-22 DIAGNOSIS — C61 PROSTATE CANCER (H): ICD-10-CM

## 2017-11-22 DIAGNOSIS — Z00.00 ROUTINE GENERAL MEDICAL EXAMINATION AT A HEALTH CARE FACILITY: Primary | ICD-10-CM

## 2017-11-22 DIAGNOSIS — R22.1 MASS OF NECK: ICD-10-CM

## 2017-11-22 PROCEDURE — 36415 COLL VENOUS BLD VENIPUNCTURE: CPT | Performed by: FAMILY MEDICINE

## 2017-11-22 PROCEDURE — 80061 LIPID PANEL: CPT | Performed by: FAMILY MEDICINE

## 2017-11-22 PROCEDURE — 80053 COMPREHEN METABOLIC PANEL: CPT | Performed by: FAMILY MEDICINE

## 2017-11-22 PROCEDURE — 99396 PREV VISIT EST AGE 40-64: CPT | Performed by: FAMILY MEDICINE

## 2017-11-22 NOTE — MR AVS SNAPSHOT
After Visit Summary   11/22/2017    oYgi Moreland    MRN: [id number]           Patient Information     Date Of Birth          1960        Visit Information        Provider Department      11/22/2017 9:30 AM Randolph Mueller MD [facility]        Today's Diagnoses     Routine general medical examination at a health care facility    -  1    Mass of neck        Prostate cancer (H)          Care Instructions      Preventive Health Recommendations  Male Ages 50 - 64    Yearly exam:             See your health care provider every year in order to  o   Review health changes.   o   Discuss preventive care.    o   Review your medicines if your doctor has prescribed any.     Have a cholesterol test every 5 years, or more frequently if you are at risk for high cholesterol/heart disease.     Have a diabetes test (fasting glucose) every three years. If you are at risk for diabetes, you should have this test more often.     Have a colonoscopy at age 50, or have a yearly FIT test (stool test). These exams will check for colon cancer.      Talk with your health care provider about whether or not a prostate cancer screening test (PSA) is right for you.    You should be tested each year for STDs (sexually transmitted diseases), if you re at risk.     Shots: Get a flu shot each year. Get a tetanus shot every 10 years.     Nutrition:    Eat at least 5 servings of fruits and vegetables daily.     Eat whole-grain bread, whole-wheat pasta and brown rice instead of white grains and rice.     Talk to your provider about Calcium and Vitamin D.     Lifestyle    Exercise for at least 150 minutes a week (30 minutes a day, 5 days a week). This will help you control your weight and prevent disease.     Limit alcohol to one drink per day.     No smoking.     Wear sunscreen to prevent skin cancer.     See your dentist every six months for an exam and cleaning.     See your eye doctor every 1 to 2  "years.            Follow-ups after your visit        Your next 10 appointments already scheduled     Nov 28, 2017 10:40 AM CST   Return Visit with Chuy Reddy MD   [facility] [place] ([facility])    [address] [facility]  [address]  [facility] [address] [phone]              Who to contact     If you have questions or need follow up information about today's clinic visit or your schedule please contact [facility] directly at [phone].  Normal or non-critical lab and imaging results will be communicated to you by [employer], letter or phone within 4 business days after the clinic has received the results. If you do not hear from us within 7 days, please contact the clinic through [employer] or phone. If you have a critical or abnormal lab result, we will notify you by phone as soon as possible.  Submit refill requests through [employer] or call your pharmacy and they will forward the refill request to us. Please allow 3 business days for your refill to be completed.          Additional Information About Your Visit        [employer] Information     [employer] gives you secure access to your electronic health record. If you see a primary care provider, you can also send messages to your care team and make appointments. If you have questions, please call your primary care clinic.  If you do not have a primary care provider, please call [phone] and they will assist you.        Care EveryWhere ID     This is your Care EveryWhere ID. This could be used by other organizations to access your [place] medical records  [id number]        Your Vitals Were     Pulse Temperature Respirations Height Pulse Oximetry BMI (Body Mass Index)    60 97.6  F (36.4  C) (Oral) 12 5' 11\" (1.803 m) 99% 23.33 kg/m2       Blood Pressure from Last 3 Encounters:   11/22/17 108/69   08/24/17 120/76   07/25/17 110/60    Weight from Last 3 Encounters:   11/22/17 167 " lb 4.8 oz (75.9 kg)   08/24/17 167 lb (75.8 kg)   07/25/17 167 lb (75.8 kg)              We Performed the Following     Comprehensive metabolic panel     Lipid panel reflex to direct LDL Fasting        Primary Care Provider Office Phone # Fax #    Randolph Mueller [phone] [address] [facility] [address]        Equal Access to Services     ROBERT TEJADA : Hadii aad ku hadasho Soomaali, waaxda luqadaha, qaybta kaalmada adeegyada, waxay idiin hayaan adeeg khrosannesh la'analin . So [facility] [phone].    ATENCIÓN: Si brenla melissa, tiene a solis disposición servicios gratuitos de asistencia lingüística. Llame al [phone].    We comply with applicable federal civil rights laws and [place] laws. We do not discriminate on the basis of race, color, national origin, age, disability, sex, sexual orientation, or gender identity.            Thank you!     Thank you for choosing [facility]  for your care. Our goal is always to provide you with excellent care. Hearing back from our patients is one way we can continue to improve our services. Please take a few minutes to complete the written survey that you may receive in the mail after your visit with us. Thank you!             Your Updated Medication List - Protect others around you: Learn how to safely use, store and throw away your medicines at www.disposemymeds.org.          This list is accurate as of: 11/22/17  9:50 AM.  Always use your most recent med list.                   Brand Name Dispense Instructions for use Diagnosis    ADVIL PO      Take 400 mg by mouth every 6 hours as needed for moderate pain        tadalafil 5 MG tablet    CIALIS    30 tablet    Take 1 tablet (5 mg) by mouth daily Never use with nitroglycerin, terazosin or doxazosin.    Prostate cancer (H)

## 2017-11-22 NOTE — PROGRESS NOTES
"SUBJECTIVE:   CC: Yogi Moreland is an 57 year old male who presents for preventative health visit.     Physical   Annual:     Getting at least 3 servings of Calcium per day::  Yes    Bi-annual eye exam::  NO    Dental care twice a year::  NO    Sleep apnea or symptoms of sleep apnea::  None    Diet::  Regular (no restrictions)    Frequency of exercise::  2-3 days/week    Duration of exercise::  15-30 minutes    Taking medications regularly::  Yes    Medication side effects::  Not applicable    Additional concerns today::  No    He had prostate surgery         Today's PHQ-2 Score: PHQ-2 ( 1999 [employer]izer) 11/18/2017   Q1: Little interest or pleasure in doing things 0   Q2: Feeling down, depressed or hopeless 0   PHQ-2 Score 0   Q1: Little interest or pleasure in doing things Not at all   Q2: Feeling down, depressed or hopeless Not at all   PHQ-2 Score 0       Abuse: Current or Past(Physical, Sexual or Emotional)- No  Do you feel safe in your environment - Yes    Social History   Substance Use Topics     Smoking status: Never Smoker     Smokeless tobacco: Never Used     Alcohol use 0.0 oz/week     0 Standard drinks or equivalent per week      Comment: 1 drink per day, binge drinks \"too drunk to drive\" once per month.         Last PSA:   PSA   Date Value Ref Range Status   08/21/2017 0.01 0 - 4 ug/L Final     Comment:     Assay Method:  Chemiluminescence using Siemens Vista analyzer       Reviewed orders with patient. Reviewed health maintenance and updated orders accordingly - Yes  BP Readings from Last 3 Encounters:   11/22/17 108/69   08/24/17 120/76   07/25/17 110/60    Wt Readings from Last 3 Encounters:   11/22/17 167 lb 4.8 oz (75.9 kg)   08/24/17 167 lb (75.8 kg)   07/25/17 167 lb (75.8 kg)              Reviewed and updated as needed this visit by Provider          Review of Systems  C: NEGATIVE for fever, chills, change in weight  I: NEGATIVE for worrisome rashes, moles or lesions  E: NEGATIVE for vision changes " or irritation  ENT: NEGATIVE for ear, mouth and throat problems  R: NEGATIVE for significant cough or SOB  CV: NEGATIVE for chest pain, palpitations or peripheral edema  GI: NEGATIVE for nausea, abdominal pain, heartburn, or change in bowel habits   male: negative for dysuria, hematuria, decreased urinary stream, erectile dysfunction, urethral discharge  M: NEGATIVE for significant arthralgias or myalgia  N: NEGATIVE for weakness, dizziness or paresthesias  E: NEGATIVE for temperature intolerance, skin/hair changes  H: NEGATIVE for bleeding problems  P: NEGATIVE for changes in mood or affect    OBJECTIVE:   There were no vitals taken for this visit.    Physical Exam  GENERAL: healthy, alert and no distress  EYES: Eyes grossly normal to inspection, PERRL and conjunctivae and sclerae normal  HENT: ear canals and TM's normal, nose and mouth without ulcers or lesions  NECK: no adenopathy, no asymmetry, masses, or scars and thyroid normal to palpation  RESP: lungs clear to auscultation - no rales, rhonchi or wheezes  CV: regular rate and rhythm, normal S1 S2, no S3 or S4, no murmur, click or rub, no peripheral edema and peripheral pulses strong  ABDOMEN: soft, nontender, no hepatosplenomegaly, no masses and bowel sounds normal  MS: no gross musculoskeletal defects noted, no edema  SKIN: no suspicious lesions or rashes  NEURO: Normal strength and tone, mentation intact and speech normal  PSYCH: mentation appears normal, affect normal/bright    ASSESSMENT/PLAN:   (Z00.00) Routine general medical examination at a health care facility  (primary encounter diagnosis)  Comment:   Plan:     (R22.1) Mass of neck  Comment: will ask Urology to check at there visit next week : not hard but moderately firm   Plan: : consider if he needs imaging.     (C61) Prostate cancer (H)  Comment:   Plan:   Question of imaging for neck mass: has scheduled Urology follow up     COUNSELING:   Reviewed preventive health counseling, as reflected in  patient instructions       Regular exercise       Healthy diet/nutrition       Vision screening    Working on erectile function .   reports that he has never smoked. He has never used smokeless tobacco.      Estimated body mass index is 22.65 kg/(m^2) as calculated from the following:    Height as of 8/24/17: 6' (1.829 m).    Weight as of 8/24/17: 167 lb (75.8 kg).      cholesterol is no optimal,  Yogi may be a candidate for a statin treatment    Counseling Resources:  ATP IV Guidelines  Pooled Cohorts Equation Calculator  FRAX Risk Assessment  ICSI Preventive Guidelines  Dietary Guidelines for Americans, 2010  USDA's MyPlate  ASA Prophylaxis  Lung CA Screening    Randolph Mueller MD  [facility] for HPI/ROS submitted by the patient on 11/18/2017   PHQ-2 Score: 0

## 2017-11-23 LAB
ALBUMIN SERPL-MCNC: 4.5 G/DL (ref 3.4–5)
ALP SERPL-CCNC: 60 U/L (ref 40–150)
ALT SERPL W P-5'-P-CCNC: 29 U/L (ref 0–70)
ANION GAP SERPL CALCULATED.3IONS-SCNC: 10 MMOL/L (ref 3–14)
AST SERPL W P-5'-P-CCNC: 24 U/L (ref 0–45)
BILIRUB SERPL-MCNC: 1 MG/DL (ref 0.2–1.3)
BUN SERPL-MCNC: 27 MG/DL (ref 7–30)
CALCIUM SERPL-MCNC: 9.5 MG/DL (ref 8.5–10.1)
CHLORIDE SERPL-SCNC: 105 MMOL/L (ref 94–109)
CHOLEST SERPL-MCNC: 232 MG/DL
CO2 SERPL-SCNC: 24 MMOL/L (ref 20–32)
CREAT SERPL-MCNC: 0.86 MG/DL (ref 0.66–1.25)
GFR SERPL CREATININE-BSD FRML MDRD: >90 ML/MIN/1.7M2
GLUCOSE SERPL-MCNC: 98 MG/DL (ref 70–99)
HDLC SERPL-MCNC: 45 MG/DL
LDLC SERPL CALC-MCNC: 165 MG/DL
NONHDLC SERPL-MCNC: 187 MG/DL
POTASSIUM SERPL-SCNC: 5.1 MMOL/L (ref 3.4–5.3)
PROT SERPL-MCNC: 7.5 G/DL (ref 6.8–8.8)
SODIUM SERPL-SCNC: 139 MMOL/L (ref 133–144)
TRIGL SERPL-MCNC: 109 MG/DL

## 2017-11-28 ENCOUNTER — OFFICE VISIT (OUTPATIENT)
Dept: UROLOGY | Facility: CLINIC | Age: 57
End: 2017-11-28
Payer: COMMERCIAL

## 2017-11-28 VITALS
HEART RATE: 70 BPM | SYSTOLIC BLOOD PRESSURE: 120 MMHG | BODY MASS INDEX: 23.38 KG/M2 | DIASTOLIC BLOOD PRESSURE: 62 MMHG | HEIGHT: 71 IN | WEIGHT: 167 LBS

## 2017-11-28 DIAGNOSIS — C61 MALIGNANT NEOPLASM OF PROSTATE (H): ICD-10-CM

## 2017-11-28 DIAGNOSIS — C61 MALIGNANT NEOPLASM OF PROSTATE (H): Primary | ICD-10-CM

## 2017-11-28 LAB — PSA SERPL-MCNC: <0.01 UG/L (ref 0–4)

## 2017-11-28 PROCEDURE — 84153 ASSAY OF PSA TOTAL: CPT | Performed by: FAMILY MEDICINE

## 2017-11-28 PROCEDURE — 36415 COLL VENOUS BLD VENIPUNCTURE: CPT | Performed by: FAMILY MEDICINE

## 2017-11-28 PROCEDURE — 99212 OFFICE O/P EST SF 10 MIN: CPT | Performed by: UROLOGY

## 2017-11-28 ASSESSMENT — PAIN SCALES - GENERAL: PAINLEVEL: NO PAIN (0)

## 2017-11-28 NOTE — MR AVS SNAPSHOT
After Visit Summary   11/28/2017    Yogi Moreland    MRN: [id number]           Patient Information     Date Of Birth          1960        Visit Information        Provider Department      11/28/2017 10:40 AM Chuy Reddy MD [facility]        Today's Diagnoses     Malignant neoplasm of prostate (H)    -  1       Follow-ups after your visit        Follow-up notes from your care team     Return in about 3 months (around 2/28/2018) for PSA.      Future tests that were ordered for you today     Open Future Orders        Priority Expected Expires Ordered    PSA tumor marker [[id number]] Routine 2/28/2018 11/28/2018 11/28/2017            Who to contact     If you have questions or need follow up information about today's clinic visit or your schedule please contact [facility]OLOG[facility] directly at [phone].  Normal or non-critical lab and imaging results will be communicated to you by MyChart, letter or phone within 4 business days after the clinic has received the results. If you do not hear from us within 7 days, please contact the clinic through [employer] or phone. If you have a critical or abnormal lab result, we will notify you by phone as soon as possible.  Submit refill requests through TL[employer] or call your pharmacy and they will forward the refill request to us. Please allow 3 business days for your refill to be completed.          Additional Information About Your Visit        MyChart Information     TLabs gives you secure access to your electronic health record. If you see a primary care provider, you can also send messages to your care team and make appointments. If you have questions, please call your primary care clinic.  If you do not have a primary care provider, please call [phone] and they will assist you.        Care EveryWhere ID     This is your Care EveryWhere ID. This could be used by other  "organizations to access your [place] medical records  [id number]        Your Vitals Were     Pulse Height BMI (Body Mass Index)             70 1.803 m (5' 11\") 23.29 kg/m2          Blood Pressure from Last 3 Encounters:   11/28/17 120/62   11/22/17 108/69   08/24/17 120/76    Weight from Last 3 Encounters:   11/28/17 75.8 kg (167 lb)   11/22/17 75.9 kg (167 lb 4.8 oz)   08/24/17 75.8 kg (167 lb)               Primary Care Provider Office Phone # Fax #    Randolph Mueller [phone] [address] [facility] [address]        Equal Access to Services     MAYDA TEJADA : Hadii aad ku hadasho Soomaali, waaxda luqadaha, qaybta kaalmada adeegyada, waxbobby shipman. So [facility] [phone].    ATENCIÓN: Si habla español, tiene a solis disposición servicios gratuitos de asistencia lingüística. [facility] [phone].    We comply with applicable federal civil rights laws and [place] laws. We do not discriminate on the basis of race, color, national origin, age, disability, sex, sexual orientation, or gender identity.            Thank you!     Thank you for choosing [facility] [place]  for your care. Our goal is always to provide you with excellent care. Hearing back from our patients is one way we can continue to improve our services. Please take a few minutes to complete the written survey that you may receive in the mail after your visit with us. Thank you!             Your Updated Medication List - Protect others around you: Learn how to safely use, store and throw away your medicines at www.disposemymeds.org.          This list is accurate as of: 11/28/17 11:20 AM.  Always use your most recent med list.                   Brand Name Dispense Instructions for use Diagnosis    ADVIL PO      Take 400 mg by mouth every 6 hours as needed for moderate pain        tadalafil 5 MG tablet    CIALIS    30 tablet    Take 1 tablet (5 mg) by " mouth daily Never use with nitroglycerin, terazosin or doxazosin.    Prostate cancer (H)

## 2017-11-28 NOTE — LETTER
11/28/2017       RE: Yogi Moreland  [address] [facility] [address]     Dear Colleague,    Thank you for referring your patient, Yogi Moreland, to the [facility] [place] at [facility]. Please see a copy of my visit note below.     Yogi Moreland is a 57-year-old male who underwent radical retropubic prostatectomy and bilateral pelvic lymph node dissection in July of this year. His PSA remains undetectable. He had nerve sparing procedure and is taking Cialis 5 mg twice a week. He is having no erections as yet and has excellent urinary control  Unfortunately he may have a new left inguinal hernia after doing some strenuous activity last week. He also asked me to look at a bulge in the cervical area  Other past medical history reviewed  Medications: Cialis, ibuprofen  Allergies: None  Exam: Possible small left inguinal hernia with cough              No cervical adenopathy palpable or supraclavicular adenopathy  Assessment: Adenocarcinoma the prostate-no evidence of disease, erectile dysfunction-continue Cialis 5 mg, discussed injection therapy, left inguinal hernia  Plan: See me in 3 months with PSA, consult with Dr. Masters regarding hernia, consider injection therapy    Again, thank you for allowing me to participate in the care of your patient.      Sincerely,    Chuy Reddy MD

## 2017-11-28 NOTE — PROGRESS NOTES
Yogi Moreland is a 57-year-old male who underwent radical retropubic prostatectomy and bilateral pelvic lymph node dissection in July of this year. His PSA remains undetectable. He had nerve sparing procedure and is taking Cialis 5 mg twice a week. He is having no erections as yet and has excellent urinary control  Unfortunately he may have a new left inguinal hernia after doing some strenuous activity last week. He also asked me to look at a bulge in the cervical area  Other past medical history reviewed  Medications: Cialis, ibuprofen  Allergies: None  Exam: Possible small left inguinal hernia with cough              No cervical adenopathy palpable or supraclavicular adenopathy  Assessment: Adenocarcinoma the prostate-no evidence of disease, erectile dysfunction-continue Cialis 5 mg, discussed injection therapy, left inguinal hernia  Plan: See me in 3 months with PSA, consult with Dr. Masters regarding hernia, consider injection therapy

## 2017-12-08 ENCOUNTER — OFFICE VISIT (OUTPATIENT)
Dept: SURGERY | Facility: CLINIC | Age: 57
End: 2017-12-08
Payer: COMMERCIAL

## 2017-12-08 VITALS
HEIGHT: 71 IN | SYSTOLIC BLOOD PRESSURE: 122 MMHG | DIASTOLIC BLOOD PRESSURE: 78 MMHG | OXYGEN SATURATION: 99 % | RESPIRATION RATE: 16 BRPM | HEART RATE: 83 BPM | BODY MASS INDEX: 23.38 KG/M2 | WEIGHT: 167 LBS

## 2017-12-08 DIAGNOSIS — K40.90 LEFT INGUINAL HERNIA: Primary | ICD-10-CM

## 2017-12-08 PROCEDURE — 99214 OFFICE O/P EST MOD 30 MIN: CPT | Performed by: SURGERY

## 2017-12-08 NOTE — MR AVS SNAPSHOT
After Visit Summary   12/8/2017    Yogi Moreland    MRN: [id number]           Patient Information     Date Of Birth          1960        Visit Information        Provider Department      12/8/2017 10:15 AM Ramón Masters MD [facility]      Today's Diagnoses     Left inguinal hernia    -  1       Follow-ups after your visit        Your next 10 appointments already scheduled     Dec 12, 2017 10:15 AM CST   Venessa Long with Randolph Mueller MD   [facility] ([facility]    [address] [place] [address]   [phone]            Dec 15, 2017   Procedure with Ramón Masters MD   [facility]iOp Services (--)    [address]  [facility] [address]   [phone]            Dec 15, 2017  1:30 PM CST   [facility] Same Day Surgery with Ramón Masters MD, Eduardo Nelson PA-C   [facility] ([facility])    [facility])   [phone]              Who to contact     If you have questions or need follow up information about today's clinic visit or your schedule please contact [facility] directly at [phone].  Normal or non-critical lab and imaging results will be communicated to you by MyChart, letter or phone within 4 business days after the clinic has received the results. If you do not hear from us within 7 days, please contact the clinic through MyChart or phone. If you have a critical or abnormal lab result, we will notify you by phone as soon as possible.  Submit refill requests through Mys[employer] or call your pharmacy and they will forward the refill request to us. Please allow 3 business days for your refill to be completed.          Additional Information About Your Visit        MyChart Information     [employer]  "gives you secure access to your electronic health record. If you see a primary care provider, you can also send messages to your care team and make appointments. If you have questions, please call your primary care clinic.  If you do not have a primary care provider, please call [phone] and they will assist you.        Care EveryWhere ID     This is your Care EveryWhere ID. This could be used by other organizations to access your [place] medical records  [id number]        Your Vitals Were     Pulse Respirations Height Pulse Oximetry BMI (Body Mass Index)       83 16 5' 11\" (1.803 m) 99% 23.29 kg/m2        Blood Pressure from Last 3 Encounters:   12/08/17 122/78   11/28/17 120/62   11/22/17 108/69    Weight from Last 3 Encounters:   12/08/17 167 lb (75.8 kg)   11/28/17 167 lb (75.8 kg)   11/22/17 167 lb 4.8 oz (75.9 kg)              Today, you had the following     No orders found for display       Primary Care Provider Office Phone # Fax #    Randolph Solomon Mueller [phone] [address] [facility] [address]        Equal Access to Services     MAYDA TEJADA AH: Hadii mauricio hartman hadasho Soomaali, waaxda luqadaha, qaybta kaalmada adeegyada, romain shipman. So [facility] [phone].    ATENCIÓN: Si habla español, tiene a solis disposición servicios gratuitos de asistencia lingüística. Llame al [phone].    We comply with applicable federal civil rights laws and [place] laws. We do not discriminate on the basis of race, color, national origin, age, disability, sex, sexual orientation, or gender identity.            Thank you!     Thank you for choosing [facility]  for your care. Our goal is always to provide you with excellent care. Hearing back from our patients is one way we can continue to improve our services. Please take a few minutes to complete the written survey that you may receive in the mail after your visit with us. Thank you!      "        Your Updated Medication List - Protect others around you: Learn how to safely use, store and throw away your medicines at www.disposemymeds.org.          This list is accurate as of: 12/8/17 11:59 PM.  Always use your most recent med list.                   Brand Name Dispense Instructions for use Diagnosis    ADVIL PO      Take 400 mg by mouth every 6 hours as needed for moderate pain        tadalafil 5 MG tablet    CIALIS    30 tablet    Take 1 tablet (5 mg) by mouth daily Never use with nitroglycerin, terazosin or doxazosin.    Prostate cancer (H)

## 2017-12-08 NOTE — LETTER
2017    Re: Yogi Moreland, : 1960    HPI:  Yogi is a 57 year old male who presents for evaluation of left groin bulging.  Patient is known to me from a previous colon surgery.  He recently underwent a prostate surgery.  He has now developed left inguinal bulging.  He has noticed no similar symptoms on the right.     Past Medical History:  Has a past medical history of Colon polyp; Colonic diverticular abscess; Elevated LFTs (2016); Elevated PSA; Enlarged prostate; Hyperlipidemia; and Prostate cancer (H).     ROS:  The 10 point review of systems is negative other than noted in the HPI and above.     PE:    General- Well-developed, well-nourished, patient able to get up on table without difficulty.  HEENT- Normocephalic and atraumatic. Pupils equal and round.  Mucous membranes moist.  Sclera are nonicteric.  Neck- No lymphadenopathy or masses   Respirations- are regular and non labored  Abdomen is abdomen is soft without significant tenderness, masses, organomegaly or guarding  Hernia- Left inguinal hernia is present with valsalva.  This is easily reducible.              There is a question of a small right inguinal hernia high in the canal.              External genitalia are normal      Assessment: left inguinal hernia.  There is also question of a small right inguinal hernia.     Plan: Given the patient's previous colon surgery and recent prostate surgery, I think would be optimal to try to avoid entry into the abdomen or the preperitoneal space.  It is therefore probably most appropriate to use an anterior approach for hernia repair.  Given the fact that his right side is questionable, and that it is completely asymptomatic, I would recommend repair of the patient's left inguinal hernia only at this time.  We have discussed observation, reduction techniques and importance, incarceration and strangulation signs, symptoms and importance as well as need to seek emergency treatment.    We have  discussed surgery in detail, including risk, benefits, complications, mesh, infection, nerve and cord damage and their sequelae including chronic pain and testicular loss, lifting and activity limits after surgery. He has been given literature to review. We will schedule surgery at patient's convenience.  We will plan on a general anesthetic.     Ramón Masters MD

## 2017-12-08 NOTE — PROGRESS NOTES
"  HPI:  Yogi is a 57 year old male who presents for evaluation of left groin bulging.  Patient is known to me from a previous colon surgery.  He recently underwent a prostate surgery.  He has now developed left inguinal bulging.  He has noticed no similar symptoms on the right.    Past Medical History:   has a past medical history of Colon polyp; Colonic diverticular abscess; Elevated LFTs (8/2016); Elevated PSA; Enlarged prostate; Hyperlipidemia; and Prostate cancer (H).    Past Surgical History:  Past Surgical History:   Procedure Laterality Date     APPENDECTOMY OPEN N/A 9/26/2016    Procedure: APPENDECTOMY OPEN;  Surgeon: Ramón Masters MD;  Location: [facility] OR     COLECTOMY WITH COLOSTOMY, COMBINED N/A 9/26/2016    Procedure: COMBINED COLECTOMY WITH COLOSTOMY;  Surgeon: Ramón Masters MD;  Location: [facility] OR     COLONOSCOPY  2009     COLONOSCOPY  2/2013     LAPAROSCOPIC ASSISTED COLOSTOMY TAKEDOWN N/A 12/27/2016    Procedure: LAPAROSCOPIC ASSISTED COLOSTOMY TAKEDOWN;  Surgeon: Ramón Masters MD;  Location: [facility] OR     LAPAROTOMY EXPLORATORY N/A 9/26/2016    Procedure: LAPAROTOMY EXPLORATORY;  Surgeon: Ramón Masters MD;  Location: [facility] OR     PROSTATECTOMY RETROPUBIC RADICAL N/A 7/11/2017    Procedure: PROSTATECTOMY RETROPUBIC RADICAL;  Retropubic Radical Prostectectomy, Bilateral pelvic Lymph Node Dissection ;  Surgeon: Chuy Reddy MD;  Location: [facility] OR        Social History:  Social History     Social History     Marital status:      Spouse name: N/A     Number of children: N/A     Years of education: N/A     Occupational History      [employer]     Social History Main Topics     Smoking status: Never Smoker     Smokeless tobacco: Never Used     Alcohol use 0.0 oz/week     0 Standard drinks or equivalent per week      Comment: 1 drink per day, binge drinks \"too drunk to drive\" once per month.     Drug use: No     Sexual activity: Yes     Partners: Female     Other Topics " Concern     Not on file     Social History Narrative        Family History:  Family History   Problem Relation Age of Onset     CANCER Mother      metastatic     CANCER Father      melanoma          ROS:  The 10 point review of systems is negative other than noted in the HPI and above.    PE:    General- Well-developed, well-nourished, patient able to get up on table without difficulty.  HEENT- Normocephalic and atraumatic. Pupils equal and round.  Mucous membranes moist.  Sclera are nonicteric.  Neck- No lymphadenopathy or masses   Respirations- are regular and non labored  Abdomen is abdomen is soft without significant tenderness, masses, organomegaly or guarding  Hernia- Left inguinal hernia is present with valsalva.  This is easily reducible.              There is a question of a small right inguinal hernia high in the canal.              External genitalia are normal               Assessment: left inguinal hernia.  There is also question of a small right inguinal hernia.    Plan: Given the patient's previous colon surgery and recent prostate surgery, I think would be optimal to try to avoid entry into the abdomen or the preperitoneal space.  It is therefore probably most appropriate to use an anterior approach for hernia repair.  Given the fact that his right side is questionable, and that it is completely asymptomatic, I would recommend repair of the patient's left inguinal hernia only at this time.  We have discussed observation, reduction techniques and importance, incarceration and strangulation signs, symptoms and importance as well as need to seek emergency treatment.    We have discussed surgery in detail, including risk, benefits, complications, mesh, infection, nerve and cord damage and their sequelae including chronic pain and testicular loss, lifting and activity limits after surgery. He has been given literature to review. We will schedule surgery at patient's convenience.  We will plan on a general  anesthetic.        Ramón Masters MD    Please route or send letter to:  Primary Care Provider (PCP) and Dr. Chuy Reddy

## 2017-12-12 ENCOUNTER — OFFICE VISIT (OUTPATIENT)
Dept: FAMILY MEDICINE | Facility: CLINIC | Age: 57
End: 2017-12-12
Payer: COMMERCIAL

## 2017-12-12 VITALS
DIASTOLIC BLOOD PRESSURE: 74 MMHG | WEIGHT: 169.1 LBS | BODY MASS INDEX: 23.67 KG/M2 | TEMPERATURE: 97.7 F | HEART RATE: 80 BPM | RESPIRATION RATE: 14 BRPM | SYSTOLIC BLOOD PRESSURE: 122 MMHG | HEIGHT: 71 IN

## 2017-12-12 DIAGNOSIS — Z01.818 PREOP GENERAL PHYSICAL EXAM: Primary | ICD-10-CM

## 2017-12-12 LAB — HGB BLD-MCNC: 15.9 G/DL (ref 13.3–17.7)

## 2017-12-12 PROCEDURE — 36415 COLL VENOUS BLD VENIPUNCTURE: CPT | Performed by: FAMILY MEDICINE

## 2017-12-12 PROCEDURE — 85018 HEMOGLOBIN: CPT | Performed by: FAMILY MEDICINE

## 2017-12-12 PROCEDURE — 99214 OFFICE O/P EST MOD 30 MIN: CPT | Performed by: FAMILY MEDICINE

## 2017-12-12 NOTE — PROGRESS NOTES
[place] [facility]  [address] [facility] [address]  [phone]  Dept: [phone]    PRE-OP EVALUATION:  Today's date: 2017    Yogi Moreland (: 1960) presents for pre-operative evaluation assessment as requested by Dr. Masters.  He requires evaluation and anesthesia risk assessment prior to undergoing surgery/procedure for treatment of the left hernia .  Proposed procedure: hernia repair    Date of Surgery/ Procedure: 12/15/17  Time of Surgery/ Procedure: 1:30pm  Hospital/Surgical Facility: [facility]  Fax number for surgical facility:   Primary Physician: Randolph Mueller  Type of Anesthesia Anticipated: Choice    Patient has a Health Care Directive or Living Will:  Not sure    Preop Questions 2017   1.  Do you have a history of heart attack, stroke, stent, bypass or surgery on an artery in the head, neck, heart or legs? No   2.  Do you ever have any pain or discomfort in your chest? No   3.  Do you have a history of  Heart Failure? No   4.   Are you troubled by shortness of breath when:  walking on a level surface, or up a slight hill, or at night? No   5.  Do you currently have a cold, bronchitis or other respiratory infection? No   6.  Do you have a cough, shortness of breath, or wheezing? No   7.  Do you sometimes get pains in the calves of your legs when you walk? No   8. Do you or anyone in your family have previous history of blood clots? No   9.  Do you or does anyone in your family have a serious bleeding problem such as prolonged bleeding following surgeries or cuts? No   10. Have you ever had problems with anemia or been told to take iron pills? No   11. Have you had any abnormal blood loss such as black, tarry or bloody stools? No   12. Have you ever had a blood transfusion? No   13. Have you or any of your relatives ever had problems with anesthesia? No   14. Do you have sleep apnea, excessive snoring or daytime drowsiness? No   15. Do  you have any prosthetic heart valves? No   16. Do you have prosthetic joints? No           HPI:                                                      Brief HPI related to upcoming procedure:       Push to have bowel movent and then left inguinal hernia    MEDICAL HISTORY:                                                    Patient Active Problem List    Diagnosis Date Noted     Prostate cancer (H) 04/06/2017     Priority: Medium     S/P colostomy takedown 12/27/2016     Priority: Medium     Diverticulitis of colon with perforation 09/26/2016     Priority: Medium     Elevated LFTs 09/02/2016     Priority: Medium     Elevated PSA 01/25/2013     Priority: Medium     History of colon polyps 01/23/2013     Priority: Medium     Hyperlipidemia LDL goal <160 01/23/2013     Priority: Medium     Impaired fasting glucose 01/23/2013     Priority: Medium      Past Medical History:   Diagnosis Date     Colon polyp      Colonic diverticular abscess      Elevated LFTs 8/2016     Elevated PSA      Enlarged prostate      Hyperlipidemia      Prostate cancer (H)      Past Surgical History:   Procedure Laterality Date     APPENDECTOMY OPEN N/A 9/26/2016    Procedure: APPENDECTOMY OPEN;  Surgeon: Ramón Masters MD;  Location: [facility] OR     COLECTOMY WITH COLOSTOMY, COMBINED N/A 9/26/2016    Procedure: COMBINED COLECTOMY WITH COLOSTOMY;  Surgeon: Ramón Masters MD;  Location:  OR     COLONOSCOPY  2009     COLONOSCOPY  2/2013     LAPAROSCOPIC ASSISTED COLOSTOMY TAKEDOWN N/A 12/27/2016    Procedure: LAPAROSCOPIC ASSISTED COLOSTOMY TAKEDOWN;  Surgeon: Ramón Masters MD;  Location: [facility] OR     LAPAROTOMY EXPLORATORY N/A 9/26/2016    Procedure: LAPAROTOMY EXPLORATORY;  Surgeon: Ramón Masters MD;  Location:  OR     PROSTATECTOMY RETROPUBIC RADICAL N/A 7/11/2017    Procedure: PROSTATECTOMY RETROPUBIC RADICAL;  Retropubic Radical Prostectectomy, Bilateral pelvic Lymph Node Dissection ;  Surgeon: Chuy Reddy MD;  Location:   "OR     Current Outpatient Prescriptions   Medication Sig Dispense Refill     tadalafil (CIALIS) 5 MG tablet Take 1 tablet (5 mg) by mouth daily Never use with nitroglycerin, terazosin or doxazosin. 30 tablet 6     Ibuprofen (ADVIL PO) Take 400 mg by mouth every 6 hours as needed for moderate pain       OTC products: None, except as noted above    No Known Allergies   Latex Allergy: NO    Social History   Substance Use Topics     Smoking status: Never Smoker     Smokeless tobacco: Never Used     Alcohol use 0.0 oz/week     0 Standard drinks or equivalent per week      Comment: 1 drink per day, binge drinks \"too drunk to drive\" once per month.     History   Drug Use No       REVIEW OF SYSTEMS:                                                    C: NEGATIVE for fever, chills, change in weight  E/M: NEGATIVE for ear, mouth and throat problems  R: NEGATIVE for significant cough or SOB  CV: NEGATIVE for chest pain, palpitations or peripheral edema  :     EXAM:                                                    /74 (BP Location: Right arm, Patient Position: Chair, Cuff Size: Adult Large)  Pulse 80  Temp 97.7  F (36.5  C) (Oral)  Resp 14  Ht 5' 11\" (1.803 m)  Wt 169 lb 1.6 oz (76.7 kg)  BMI 23.58 kg/m2      GENERAL APPEARANCE: healthy, alert and no distress     EYES: EOMI,  PERRL     HENT: ear canals and TM's normal and nose and mouth without ulcers or lesions     NECK: no adenopathy, no asymmetry, masses, or scars and thyroid normal to palpation     RESP: lungs clear to auscultation - no rales, rhonchi or wheezes     CV: regular rates and rhythm, normal S1 S2, no S3 or S4 and no murmur, click or rub     ABDOMEN:  soft, nontender, no HSM or masses and bowel sounds normal     GU_male: hernia left inguinal      MS: extremities normal- no gross deformities noted, no evidence of inflammation in joints, FROM in all extremities.     SKIN: no suspicious lesions or rashes     NEURO: Normal strength and tone, sensory " exam grossly normal, mentation intact and speech normal     PSYCH: mentation appears normal. and affect normal/bright     LYMPHATICS: No axillary, cervical, or supraclavicular nodes    DIAGNOSTICS:                                                    EKG: appears normal, NSR, normal axis, normal intervals, no acute ST/T changes c/w ischemia, no LVH by voltage criteria, unchanged from previous tracings    Recent Labs   Lab Test  11/22/17   0953  07/12/17   0741  07/11/17   1625   09/04/16   2240   08/26/16   1038   HGB   --   12.5*  13.6   < >  14.0   < >  14.7   PLT   --   205  230   < >  333   < >  218   INR   --    --    --    --   1.01   --    --    NA  139  140  140   < >  137   < >  135   POTASSIUM  5.1  4.1  4.5   < >  4.0   < >  4.2   CR  0.86  0.80  0.89   < >  0.74   < >  0.93   A1C   --    --    --    --    --    --   5.6    < > = values in this interval not displayed.        IMPRESSION:                                                    Reason for surgery/procedure: left inguinal hernia  Diagnosis/reason for consult: preop    The proposed surgical procedure is considered LOW risk.    REVISED CARDIAC RISK INDEX  The patient has the following serious cardiovascular risks for perioperative complications such as (MI, PE, VFib and 3  AV Block):  No serious cardiac risks  INTERPRETATION: 1 risks: Class II (low risk - 0.9% complication rate)    The patient has the following additional risks for perioperative complications:  No identified additional risks      RECOMMENDATIONS:                                                        Fit for surgery     (Z01.818) Preop general physical exam  (primary encounter diagnosis)  Comment:   Plan:          Signed Electronically by: Randolph Mueller MD    Copy of this evaluation report is provided to requesting physician.    [place] Preop Guidelines

## 2017-12-12 NOTE — MR AVS SNAPSHOT
After Visit Summary   12/12/2017    Yogi Moreland    MRN: [id number]           Patient Information     Date Of Birth          1960        Visit Information        Provider Department      12/12/2017 10:15 AM Randolph Mueller MD [facility]        Today's Diagnoses     Preop general physical exam    -  1      Care Instructions      Before Your Surgery      Call your surgeon if there is any change in your health. This includes signs of a cold or flu (such as a sore throat, runny nose, cough, rash or fever).    Do not smoke, drink alcohol or take over the counter medicine (unless your surgeon or primary care doctor tells you to) for the 24 hours before and after surgery.    If you take prescribed drugs: Follow your doctor s orders about which medicines to take and which to stop until after surgery.    Eating and drinking prior to surgery: follow the instructions from your surgeon    Take a shower or bath the night before surgery. Use the soap your surgeon gave you to gently clean your skin. If you do not have soap from your surgeon, use your regular soap. Do not shave or scrub the surgery site.  Wear clean pajamas and have clean sheets on your bed.           Follow-ups after your visit        Your next 10 appointments already scheduled     Dec 15, 2017   Procedure with Ramón Masters MD   [facility] (--)    [address] [facility] [address]   [phone]            Dec 15, 2017  1:30 PM CST   [facility] Same Day Surgery with Ramón Masters MD, Eduardo Nelson PA-C   [facility] ([facility])    [facility] ([facility])   [phone]              Who to contact     If you have questions or need follow up information about today's clinic visit or your schedule please contact [facility] directly at [phone].  Normal  "or non-critical lab and imaging results will be communicated to you by MyChart, letter or phone within 4 business days after the clinic has received the results. If you do not hear from us within 7 days, please contact the clinic through [employer] or phone. If you have a critical or abnormal lab result, we will notify you by phone as soon as possible.  Submit refill requests through [employer] or call your pharmacy and they will forward the refill request to us. Please allow 3 business days for your refill to be completed.          Additional Information About Your Visit        [employer] Mobile Information     [employer] gives you secure access to your electronic health record. If you see a primary care provider, you can also send messages to your care team and make appointments. If you have questions, please call your primary care clinic.  If you do not have a primary care provider, please call [phone] and they will assist you.        Care EveryWhere ID     This is your Care EveryWhere ID. This could be used by other organizations to access your [place] medical records  [id number]        Your Vitals Were     Pulse Temperature Respirations Height BMI (Body Mass Index)       80 97.7  F (36.5  C) (Oral) 14 5' 11\" (1.803 m) 23.58 kg/m2        Blood Pressure from Last 3 Encounters:   12/12/17 122/74   12/08/17 122/78   11/28/17 120/62    Weight from Last 3 Encounters:   12/12/17 169 lb 1.6 oz (76.7 kg)   12/08/17 167 lb (75.8 kg)   11/28/17 167 lb (75.8 kg)              We Performed the Following     Hemoglobin        Primary Care Provider Office Phone # Fax #    Randolph Solomon Mueller [phone] [address] [facility] [address]        Equal Access to Services     ROBERT TEJADA : Hadnaima Motta, wahayes singh, qaybta kaalmada vik, romain shipman. So [facility] [phone].    ATENCIÓN: Si habla español, tiene a solis disposición servicios gratuitos de " asistencia lingüística. Carina al [phone].    We comply with applicable federal civil rights laws and [place] laws. We do not discriminate on the basis of race, color, national origin, age, disability, sex, sexual orientation, or gender identity.            Thank you!     Thank you for choosing [facility]  for your care. Our goal is always to provide you with excellent care. Hearing back from our patients is one way we can continue to improve our services. Please take a few minutes to complete the written survey that you may receive in the mail after your visit with us. Thank you!             Your Updated Medication List - Protect others around you: Learn how to safely use, store and throw away your medicines at www.disposemymeds.org.          This list is accurate as of: 12/12/17 10:38 AM.  Always use your most recent med list.                   Brand Name Dispense Instructions for use Diagnosis    ADVIL PO      Take 400 mg by mouth every 6 hours as needed for moderate pain        tadalafil 5 MG tablet    CIALIS    30 tablet    Take 1 tablet (5 mg) by mouth daily Never use with nitroglycerin, terazosin or doxazosin.    Prostate cancer (H)

## 2017-12-13 DIAGNOSIS — N52.9 ED (ERECTILE DYSFUNCTION): Primary | ICD-10-CM

## 2017-12-13 RX ORDER — TADALAFIL 5 MG/1
5 TABLET ORAL DAILY PRN
Qty: 30 TABLET | Refills: 3 | Status: SHIPPED | OUTPATIENT
Start: 2017-12-13 | End: 2019-02-12

## 2017-12-14 NOTE — H&P (VIEW-ONLY)
[facility]  [address] [facility] [address]  [phone]  Dept: [phone]    PRE-OP EVALUATION:  Today's date: 2017    Yogi Moreland (: 1960) presents for pre-operative evaluation assessment as requested by Dr. Masters.  He requires evaluation and anesthesia risk assessment prior to undergoing surgery/procedure for treatment of the left hernia .  Proposed procedure: hernia repair    Date of Surgery/ Procedure: 12/15/17  Time of Surgery/ Procedure: 1:30pm  Hospital/Surgical Facility: [facility]  Fax number for surgical facility:   Primary Physician: Randolph Mueller  Type of Anesthesia Anticipated: Choice    Patient has a Health Care Directive or Living Will:  Not sure    Preop Questions 2017   1.  Do you have a history of heart attack, stroke, stent, bypass or surgery on an artery in the head, neck, heart or legs? No   2.  Do you ever have any pain or discomfort in your chest? No   3.  Do you have a history of  Heart Failure? No   4.   Are you troubled by shortness of breath when:  walking on a level surface, or up a slight hill, or at night? No   5.  Do you currently have a cold, bronchitis or other respiratory infection? No   6.  Do you have a cough, shortness of breath, or wheezing? No   7.  Do you sometimes get pains in the calves of your legs when you walk? No   8. Do you or anyone in your family have previous history of blood clots? No   9.  Do you or does anyone in your family have a serious bleeding problem such as prolonged bleeding following surgeries or cuts? No   10. Have you ever had problems with anemia or been told to take iron pills? No   11. Have you had any abnormal blood loss such as black, tarry or bloody stools? No   12. Have you ever had a blood transfusion? No   13. Have you or any of your relatives ever had problems with anesthesia? No   14. Do you have sleep apnea, excessive snoring or daytime drowsiness? No   15. Do  you have any prosthetic heart valves? No   16. Do you have prosthetic joints? No           HPI:                                                      Brief HPI related to upcoming procedure:       Push to have bowel movent and then left inguinal hernia    MEDICAL HISTORY:                                                    Patient Active Problem List    Diagnosis Date Noted     Prostate cancer (H) 04/06/2017     Priority: Medium     S/P colostomy takedown 12/27/2016     Priority: Medium     Diverticulitis of colon with perforation 09/26/2016     Priority: Medium     Elevated LFTs 09/02/2016     Priority: Medium     Elevated PSA 01/25/2013     Priority: Medium     History of colon polyps 01/23/2013     Priority: Medium     Hyperlipidemia LDL goal <160 01/23/2013     Priority: Medium     Impaired fasting glucose 01/23/2013     Priority: Medium      Past Medical History:   Diagnosis Date     Colon polyp      Colonic diverticular abscess      Elevated LFTs 8/2016     Elevated PSA      Enlarged prostate      Hyperlipidemia      Prostate cancer (H)      Past Surgical History:   Procedure Laterality Date     APPENDECTOMY OPEN N/A 9/26/2016    Procedure: APPENDECTOMY OPEN;  Surgeon: Ramón Masters MD;  Location: [facility] OR     COLECTOMY WITH COLOSTOMY, COMBINED N/A 9/26/2016    Procedure: COMBINED COLECTOMY WITH COLOSTOMY;  Surgeon: Ramón Masters MD;  Location:  OR     COLONOSCOPY  2009     COLONOSCOPY  2/2013     LAPAROSCOPIC ASSISTED COLOSTOMY TAKEDOWN N/A 12/27/2016    Procedure: LAPAROSCOPIC ASSISTED COLOSTOMY TAKEDOWN;  Surgeon: Ramón Masters MD;  Location: [facility] OR     LAPAROTOMY EXPLORATORY N/A 9/26/2016    Procedure: LAPAROTOMY EXPLORATORY;  Surgeon: Ramón Masters MD;  Location:  OR     PROSTATECTOMY RETROPUBIC RADICAL N/A 7/11/2017    Procedure: PROSTATECTOMY RETROPUBIC RADICAL;  Retropubic Radical Prostectectomy, Bilateral pelvic Lymph Node Dissection ;  Surgeon: Chuy Reddy MD;  Location:   "OR     Current Outpatient Prescriptions   Medication Sig Dispense Refill     tadalafil (CIALIS) 5 MG tablet Take 1 tablet (5 mg) by mouth daily Never use with nitroglycerin, terazosin or doxazosin. 30 tablet 6     Ibuprofen (ADVIL PO) Take 400 mg by mouth every 6 hours as needed for moderate pain       OTC products: None, except as noted above    No Known Allergies   Latex Allergy: NO    Social History   Substance Use Topics     Smoking status: Never Smoker     Smokeless tobacco: Never Used     Alcohol use 0.0 oz/week     0 Standard drinks or equivalent per week      Comment: 1 drink per day, binge drinks \"too drunk to drive\" once per month.     History   Drug Use No       REVIEW OF SYSTEMS:                                                    C: NEGATIVE for fever, chills, change in weight  E/M: NEGATIVE for ear, mouth and throat problems  R: NEGATIVE for significant cough or SOB  CV: NEGATIVE for chest pain, palpitations or peripheral edema  :     EXAM:                                                    /74 (BP Location: Right arm, Patient Position: Chair, Cuff Size: Adult Large)  Pulse 80  Temp 97.7  F (36.5  C) (Oral)  Resp 14  Ht 5' 11\" (1.803 m)  Wt 169 lb 1.6 oz (76.7 kg)  BMI 23.58 kg/m2      GENERAL APPEARANCE: healthy, alert and no distress     EYES: EOMI,  PERRL     HENT: ear canals and TM's normal and nose and mouth without ulcers or lesions     NECK: no adenopathy, no asymmetry, masses, or scars and thyroid normal to palpation     RESP: lungs clear to auscultation - no rales, rhonchi or wheezes     CV: regular rates and rhythm, normal S1 S2, no S3 or S4 and no murmur, click or rub     ABDOMEN:  soft, nontender, no HSM or masses and bowel sounds normal     GU_male: hernia left inguinal      MS: extremities normal- no gross deformities noted, no evidence of inflammation in joints, FROM in all extremities.     SKIN: no suspicious lesions or rashes     NEURO: Normal strength and tone, sensory " exam grossly normal, mentation intact and speech normal     PSYCH: mentation appears normal. and affect normal/bright     LYMPHATICS: No axillary, cervical, or supraclavicular nodes    DIAGNOSTICS:                                                    EKG: appears normal, NSR, normal axis, normal intervals, no acute ST/T changes c/w ischemia, no LVH by voltage criteria, unchanged from previous tracings    Recent Labs   Lab Test  11/22/17   0953  07/12/17   0741  07/11/17   1625   09/04/16   2240   08/26/16   1038   HGB   --   12.5*  13.6   < >  14.0   < >  14.7   PLT   --   205  230   < >  333   < >  218   INR   --    --    --    --   1.01   --    --    NA  139  140  140   < >  137   < >  135   POTASSIUM  5.1  4.1  4.5   < >  4.0   < >  4.2   CR  0.86  0.80  0.89   < >  0.74   < >  0.93   A1C   --    --    --    --    --    --   5.6    < > = values in this interval not displayed.        IMPRESSION:                                                    Reason for surgery/procedure: left inguinal hernia  Diagnosis/reason for consult: preop    The proposed surgical procedure is considered LOW risk.    REVISED CARDIAC RISK INDEX  The patient has the following serious cardiovascular risks for perioperative complications such as (MI, PE, VFib and 3  AV Block):  No serious cardiac risks  INTERPRETATION: 1 risks: Class II (low risk - 0.9% complication rate)    The patient has the following additional risks for perioperative complications:  No identified additional risks      RECOMMENDATIONS:                                                        Fit for surgery     (Z01.818) Preop general physical exam  (primary encounter diagnosis)  Comment:   Plan:          Signed Electronically by: Randolph Mueller MD    Copy of this evaluation report is provided to requesting physician.    [place] Preop Guidelines

## 2017-12-15 ENCOUNTER — APPOINTMENT (OUTPATIENT)
Dept: SURGERY | Facility: PHYSICIAN GROUP | Age: 57
End: 2017-12-15
Payer: COMMERCIAL

## 2017-12-15 ENCOUNTER — SURGERY (OUTPATIENT)
Age: 57
End: 2017-12-15

## 2017-12-15 ENCOUNTER — HOSPITAL ENCOUNTER (OUTPATIENT)
Facility: CLINIC | Age: 57
Discharge: HOME | End: 2017-12-15
Attending: SURGERY | Admitting: SURGERY
Payer: COMMERCIAL

## 2017-12-15 ENCOUNTER — ANESTHESIA EVENT (OUTPATIENT)
Dept: SURGERY | Facility: CLINIC | Age: 57
End: 2017-12-15
Payer: COMMERCIAL

## 2017-12-15 ENCOUNTER — ANESTHESIA (OUTPATIENT)
Dept: SURGERY | Facility: CLINIC | Age: 57
End: 2017-12-15
Payer: COMMERCIAL

## 2017-12-15 VITALS
OXYGEN SATURATION: 96 % | WEIGHT: 168 LBS | TEMPERATURE: 98.6 F | DIASTOLIC BLOOD PRESSURE: 81 MMHG | BODY MASS INDEX: 23.52 KG/M2 | HEIGHT: 71 IN | RESPIRATION RATE: 16 BRPM | HEART RATE: 64 BPM | SYSTOLIC BLOOD PRESSURE: 137 MMHG

## 2017-12-15 DIAGNOSIS — K40.90 LEFT INGUINAL HERNIA: Primary | ICD-10-CM

## 2017-12-15 PROCEDURE — 25000128 H RX IP 250 OP 636: Performed by: NURSE ANESTHETIST, CERTIFIED REGISTERED

## 2017-12-15 PROCEDURE — 25000132 ZZH RX MED GY IP 250 OP 250 PS 637: Performed by: SURGERY

## 2017-12-15 PROCEDURE — 25000128 H RX IP 250 OP 636: Performed by: SURGERY

## 2017-12-15 PROCEDURE — 37000008 ZZH ANESTHESIA TECHNICAL FEE, 1ST 30 MIN: Performed by: SURGERY

## 2017-12-15 PROCEDURE — 36000052 ZZH SURGERY LEVEL 2 EA 15 ADDTL MIN: Performed by: SURGERY

## 2017-12-15 PROCEDURE — C1781 MESH (IMPLANTABLE): HCPCS | Performed by: SURGERY

## 2017-12-15 PROCEDURE — 71000012 ZZH RECOVERY PHASE 1 LEVEL 1 FIRST HR: Performed by: SURGERY

## 2017-12-15 PROCEDURE — 49505 PRP I/HERN INIT REDUC >5 YR: CPT | Performed by: SURGERY

## 2017-12-15 PROCEDURE — 71000027 ZZH RECOVERY PHASE 2 EACH 15 MINS: Performed by: SURGERY

## 2017-12-15 PROCEDURE — 25000128 H RX IP 250 OP 636: Performed by: ANESTHESIOLOGY

## 2017-12-15 PROCEDURE — 27210794 ZZH OR GENERAL SUPPLY STERILE: Performed by: SURGERY

## 2017-12-15 PROCEDURE — 40000306 ZZH STATISTIC PRE PROC ASSESS II: Performed by: SURGERY

## 2017-12-15 PROCEDURE — 37000009 ZZH ANESTHESIA TECHNICAL FEE, EACH ADDTL 15 MIN: Performed by: SURGERY

## 2017-12-15 PROCEDURE — 25000125 ZZHC RX 250: Performed by: SURGERY

## 2017-12-15 PROCEDURE — 25000125 ZZHC RX 250: Performed by: NURSE ANESTHETIST, CERTIFIED REGISTERED

## 2017-12-15 PROCEDURE — 25000566 ZZH SEVOFLURANE, EA 15 MIN: Performed by: SURGERY

## 2017-12-15 PROCEDURE — 49505 PRP I/HERN INIT REDUC >5 YR: CPT | Mod: AS | Performed by: PHYSICIAN ASSISTANT

## 2017-12-15 PROCEDURE — 36000050 ZZH SURGERY LEVEL 2 1ST 30 MIN: Performed by: SURGERY

## 2017-12-15 DEVICE — MESH ULTRAPRO 03X06": Type: IMPLANTABLE DEVICE | Site: INGUINAL | Status: FUNCTIONAL

## 2017-12-15 RX ORDER — LABETALOL HYDROCHLORIDE 5 MG/ML
10 INJECTION, SOLUTION INTRAVENOUS
Status: DISCONTINUED | OUTPATIENT
Start: 2017-12-15 | End: 2017-12-15 | Stop reason: HOSPADM

## 2017-12-15 RX ORDER — DEXAMETHASONE SODIUM PHOSPHATE 4 MG/ML
INJECTION, SOLUTION INTRA-ARTICULAR; INTRALESIONAL; INTRAMUSCULAR; INTRAVENOUS; SOFT TISSUE PRN
Status: DISCONTINUED | OUTPATIENT
Start: 2017-12-15 | End: 2017-12-15

## 2017-12-15 RX ORDER — FENTANYL CITRATE 50 UG/ML
INJECTION, SOLUTION INTRAMUSCULAR; INTRAVENOUS PRN
Status: DISCONTINUED | OUTPATIENT
Start: 2017-12-15 | End: 2017-12-15

## 2017-12-15 RX ORDER — NALOXONE HYDROCHLORIDE 0.4 MG/ML
.1-.4 INJECTION, SOLUTION INTRAMUSCULAR; INTRAVENOUS; SUBCUTANEOUS
Status: DISCONTINUED | OUTPATIENT
Start: 2017-12-15 | End: 2017-12-15 | Stop reason: HOSPADM

## 2017-12-15 RX ORDER — FENTANYL CITRATE 50 UG/ML
25-50 INJECTION, SOLUTION INTRAMUSCULAR; INTRAVENOUS
Status: DISCONTINUED | OUTPATIENT
Start: 2017-12-15 | End: 2017-12-15 | Stop reason: HOSPADM

## 2017-12-15 RX ORDER — HYDROMORPHONE HYDROCHLORIDE 1 MG/ML
.3-.5 INJECTION, SOLUTION INTRAMUSCULAR; INTRAVENOUS; SUBCUTANEOUS EVERY 10 MIN PRN
Status: DISCONTINUED | OUTPATIENT
Start: 2017-12-15 | End: 2017-12-15 | Stop reason: HOSPADM

## 2017-12-15 RX ORDER — ONDANSETRON 4 MG/1
4 TABLET, ORALLY DISINTEGRATING ORAL EVERY 30 MIN PRN
Status: DISCONTINUED | OUTPATIENT
Start: 2017-12-15 | End: 2017-12-15 | Stop reason: HOSPADM

## 2017-12-15 RX ORDER — OXYCODONE HYDROCHLORIDE 5 MG/1
5-10 TABLET ORAL
Qty: 30 TABLET | Refills: 0 | Status: SHIPPED | OUTPATIENT
Start: 2017-12-15 | End: 2019-02-12

## 2017-12-15 RX ORDER — ONDANSETRON 2 MG/ML
4 INJECTION INTRAMUSCULAR; INTRAVENOUS EVERY 30 MIN PRN
Status: DISCONTINUED | OUTPATIENT
Start: 2017-12-15 | End: 2017-12-15 | Stop reason: HOSPADM

## 2017-12-15 RX ORDER — ONDANSETRON 2 MG/ML
INJECTION INTRAMUSCULAR; INTRAVENOUS PRN
Status: DISCONTINUED | OUTPATIENT
Start: 2017-12-15 | End: 2017-12-15

## 2017-12-15 RX ORDER — PROPOFOL 10 MG/ML
INJECTION, EMULSION INTRAVENOUS PRN
Status: DISCONTINUED | OUTPATIENT
Start: 2017-12-15 | End: 2017-12-15

## 2017-12-15 RX ORDER — BUPIVACAINE HYDROCHLORIDE 5 MG/ML
INJECTION, SOLUTION EPIDURAL; INTRACAUDAL PRN
Status: DISCONTINUED | OUTPATIENT
Start: 2017-12-15 | End: 2017-12-15 | Stop reason: HOSPADM

## 2017-12-15 RX ORDER — SODIUM CHLORIDE, SODIUM LACTATE, POTASSIUM CHLORIDE, CALCIUM CHLORIDE 600; 310; 30; 20 MG/100ML; MG/100ML; MG/100ML; MG/100ML
INJECTION, SOLUTION INTRAVENOUS CONTINUOUS
Status: DISCONTINUED | OUTPATIENT
Start: 2017-12-15 | End: 2017-12-15 | Stop reason: HOSPADM

## 2017-12-15 RX ORDER — HYDRALAZINE HYDROCHLORIDE 20 MG/ML
2.5-5 INJECTION INTRAMUSCULAR; INTRAVENOUS EVERY 10 MIN PRN
Status: DISCONTINUED | OUTPATIENT
Start: 2017-12-15 | End: 2017-12-15 | Stop reason: HOSPADM

## 2017-12-15 RX ORDER — CEFAZOLIN SODIUM 1 G/3ML
1 INJECTION, POWDER, FOR SOLUTION INTRAMUSCULAR; INTRAVENOUS SEE ADMIN INSTRUCTIONS
Status: DISCONTINUED | OUTPATIENT
Start: 2017-12-15 | End: 2017-12-15 | Stop reason: HOSPADM

## 2017-12-15 RX ORDER — DIMENHYDRINATE 50 MG/ML
25 INJECTION, SOLUTION INTRAMUSCULAR; INTRAVENOUS
Status: DISCONTINUED | OUTPATIENT
Start: 2017-12-15 | End: 2017-12-15 | Stop reason: HOSPADM

## 2017-12-15 RX ORDER — OXYCODONE HYDROCHLORIDE 5 MG/1
5 TABLET ORAL
Status: COMPLETED | OUTPATIENT
Start: 2017-12-15 | End: 2017-12-15

## 2017-12-15 RX ORDER — LIDOCAINE HYDROCHLORIDE 10 MG/ML
INJECTION, SOLUTION INFILTRATION; PERINEURAL PRN
Status: DISCONTINUED | OUTPATIENT
Start: 2017-12-15 | End: 2017-12-15

## 2017-12-15 RX ORDER — CEFAZOLIN SODIUM 2 G/100ML
2 INJECTION, SOLUTION INTRAVENOUS
Status: COMPLETED | OUTPATIENT
Start: 2017-12-15 | End: 2017-12-15

## 2017-12-15 RX ORDER — KETOROLAC TROMETHAMINE 30 MG/ML
INJECTION, SOLUTION INTRAMUSCULAR; INTRAVENOUS PRN
Status: DISCONTINUED | OUTPATIENT
Start: 2017-12-15 | End: 2017-12-15

## 2017-12-15 RX ORDER — GLYCOPYRROLATE 0.2 MG/ML
INJECTION, SOLUTION INTRAMUSCULAR; INTRAVENOUS PRN
Status: DISCONTINUED | OUTPATIENT
Start: 2017-12-15 | End: 2017-12-15

## 2017-12-15 RX ORDER — MEPERIDINE HYDROCHLORIDE 25 MG/ML
12.5 INJECTION INTRAMUSCULAR; INTRAVENOUS; SUBCUTANEOUS
Status: DISCONTINUED | OUTPATIENT
Start: 2017-12-15 | End: 2017-12-15 | Stop reason: HOSPADM

## 2017-12-15 RX ORDER — LIDOCAINE 40 MG/G
CREAM TOPICAL
Status: DISCONTINUED | OUTPATIENT
Start: 2017-12-15 | End: 2017-12-15 | Stop reason: HOSPADM

## 2017-12-15 RX ADMIN — FENTANYL CITRATE 150 MCG: 50 INJECTION, SOLUTION INTRAMUSCULAR; INTRAVENOUS at 13:19

## 2017-12-15 RX ADMIN — MIDAZOLAM 2 MG: 1 INJECTION INTRAMUSCULAR; INTRAVENOUS at 13:13

## 2017-12-15 RX ADMIN — ONDANSETRON 4 MG: 2 INJECTION INTRAMUSCULAR; INTRAVENOUS at 13:19

## 2017-12-15 RX ADMIN — OXYCODONE HYDROCHLORIDE 5 MG: 5 TABLET ORAL at 15:05

## 2017-12-15 RX ADMIN — DEXAMETHASONE SODIUM PHOSPHATE 8 MG: 4 INJECTION, SOLUTION INTRA-ARTICULAR; INTRALESIONAL; INTRAMUSCULAR; INTRAVENOUS; SOFT TISSUE at 13:19

## 2017-12-15 RX ADMIN — BUPIVACAINE HYDROCHLORIDE 19 ML: 5 INJECTION, SOLUTION EPIDURAL; INTRACAUDAL at 14:08

## 2017-12-15 RX ADMIN — KETOROLAC TROMETHAMINE 30 MG: 30 INJECTION, SOLUTION INTRAMUSCULAR at 13:19

## 2017-12-15 RX ADMIN — CEFAZOLIN SODIUM 2 G: 2 INJECTION, SOLUTION INTRAVENOUS at 13:26

## 2017-12-15 RX ADMIN — LIDOCAINE HYDROCHLORIDE 30 MG: 10 INJECTION, SOLUTION INFILTRATION; PERINEURAL at 13:19

## 2017-12-15 RX ADMIN — SODIUM CHLORIDE, POTASSIUM CHLORIDE, SODIUM LACTATE AND CALCIUM CHLORIDE: 600; 310; 30; 20 INJECTION, SOLUTION INTRAVENOUS at 12:44

## 2017-12-15 RX ADMIN — GLYCOPYRROLATE 0.2 MG: 0.2 INJECTION, SOLUTION INTRAMUSCULAR; INTRAVENOUS at 13:19

## 2017-12-15 RX ADMIN — ROCURONIUM BROMIDE 20 MG: 10 INJECTION INTRAVENOUS at 13:19

## 2017-12-15 RX ADMIN — PROPOFOL 200 MG: 10 INJECTION, EMULSION INTRAVENOUS at 13:19

## 2017-12-15 ASSESSMENT — LIFESTYLE VARIABLES: TOBACCO_USE: 0

## 2017-12-15 ASSESSMENT — ENCOUNTER SYMPTOMS
SEIZURES: 0
DYSRHYTHMIAS: 0
STRIDOR: 0

## 2017-12-15 ASSESSMENT — COPD QUESTIONNAIRES: COPD: 0

## 2017-12-15 NOTE — ANESTHESIA CARE TRANSFER NOTE
Patient: Yogi Moreland    Procedure(s):  Left Inguinal hernia repair with Mesh  - Wound Class: I-Clean    Diagnosis: Inguinal hernia   Diagnosis Additional Information: BARBIE Masters    Anesthesia Type:   General, LMA     Note:  Airway :Face Mask  Patient transferred to:PACU  Comments: Pt spon resp, follows commands,LMA removed without complications. Pt tx to PACU, VSS, pt comfortable. Report given to  PACU RN.Handoff Report: Identifed the Patient, Identified the Reponsible Provider, Reviewed the pertinent medical history, Discussed the surgical course, Reviewed Intra-OP anesthesia mangement and issues during anesthesia, Set expectations for post-procedure period and Allowed opportunity for questions and acknowledgement of understanding      Vitals: (Last set prior to Anesthesia Care Transfer)    CRNA VITALS  12/15/2017 1356 - 12/15/2017 1432      12/15/2017             Pulse: 65    SpO2: 99 %    Resp Rate (observed): 12                Electronically Signed By: RIGO Whittaker CRNA  December 15, 2017  2:32 PM

## 2017-12-15 NOTE — OP NOTE
General Surgery Operative Note    Pre-operative diagnosis:  Left inguinal hernia    Post-operative diagnosis:  Left inguinal hernia   Procedure: Left Inguinal Herniorrhaphy with Mesh   Surgeon: Ramón Masters MD   Assistant(s): Staci Trinidad PA-C   Anesthesia: General    Estimated blood loss: 5 cc's   Drains placed: None   Complications:  None   Findings:   Moderate-sized indirect hernia.  Repair was achieved using a modified Ctaalino technique.     Indications for operation: This is a 57-year-old gentleman with a previous history of colon resection and recent prostate surgery.  He has now presented with a left inguinal hernia.  Open repair of the hernia was recommended, and the procedure, along with its risks and complications, was discussed with the patient.  He agreed to proceed.    Details of the operation: After informed consent, the patient was taken to the operating room where he underwent satisfactory induction of general anesthesia.  The patient was sterilely prepped and draped and a left ilioinguinal nerve block was performed.  A left inguinal incision was made, and dissection was carried through the subcutaneous tissue using electrocautery.  The external oblique fascia was exposed and opened sharply into the external ring.  The cord structures were encircled at the level of the pubis and a Penrose drain was passed around them.  The cord was freed up to the level of the internal ring.  There was a fairly prominent indirect sac.  This was carefully dissected away from the cord structures and was then twisted upon itself and suture ligated at its base.  There is no significant direct bulge.  A piece of ultra pro flat mesh was now brought into the field and cut to shape in a modified Catalino fashion.  This was sutured down over the pubis using a 2-0 PDS suture, which was run up along the shelving edge to the level of the internal ring where it was tied.  The tails of the mesh were passed around  the spermatic cord and sutured to each other and to the shelving edge, creating a new internal ring which was snug, but not tight.  The tails of the mesh were tucked up underneath the external oblique fascia and the medial mesh was tacked down using a couple of 2-0 PDS sutures.  The external oblique fascia was now closed using a running 2-0 Vicryl suture.  The subcutaneous tissue was irrigated out and the entire area was infiltrated with 0.5% Marcaine.  The subdermal tissues were approximated using interrupted 3-0 Vicryl sutures and the skin was closed using Dermabond skin adhesive.    The patient tolerated the procedure well and was transferred to the recovery room in satisfactory condition.  Sponge and needle counts were correct at the close of the case.    Specimens: * No specimens in log *        Ramón Masters MD

## 2017-12-15 NOTE — ANESTHESIA POSTPROCEDURE EVALUATION
Patient: Yogi Moreland    Procedure(s):  Left Inguinal hernia repair with Mesh  - Wound Class: I-Clean    Diagnosis:Inguinal hernia   Diagnosis Additional Information: BARBIE Masters    Anesthesia Type:  General, LMA    Note:  Anesthesia Post Evaluation    Patient location during evaluation: PACU  Patient participation: Able to fully participate in evaluation  Level of consciousness: awake  Pain management: adequate  Airway patency: patent  Cardiovascular status: acceptable  Respiratory status: acceptable  Hydration status: acceptable  PONV: none             Last vitals:  Vitals:    12/15/17 1139   BP: 128/77   Pulse: 64   Resp: 16   Temp: 97.5  F (36.4  C)   SpO2: 98%         Electronically Signed By: Jayson Saravia MD  December 15, 2017  2:31 PM

## 2017-12-15 NOTE — IP AVS SNAPSHOT
[facility] PreOP/PostOP    [address]    [facility] [address]    Phone:  [phone]    Fax:  [phone]                                       After Visit Summary   12/15/2017    Yogi Moreland    MRN: [id number]           After Visit Summary Signature Page     I have received my discharge instructions, and my questions have been answered. I have discussed any challenges I see with this plan with the nurse or doctor.    ..........................................................................................................................................  Patient/Patient Representative Signature      ..........................................................................................................................................  Patient Representative Print Name and Relationship to Patient    ..................................................               ................................................  Date                                            Time    ..........................................................................................................................................  Reviewed by Signature/Title    ...................................................              ..............................................  Date                                                            Time

## 2017-12-15 NOTE — IP AVS SNAPSHOT
MRN:[id number]                      After Visit Summary   12/15/2017    Yogi Moreland    MRN: [id number]           Thank you!     Thank you for choosing [facility] for your care. Our goal is always to provide you with excellent care. Hearing back from our patients is one way we can continue to improve our services. Please take a few minutes to complete the written survey that you may receive in the mail after you visit. If you would like to speak to someone directly about your visit please contact Patient Relations at [phone]. Thank you!          Patient Information     Date Of Birth          1960        About your hospital stay     You were admitted on:  December 15, 2017 You last received care in the:  [facility] PreOP/PostOP    You were discharged on:  December 15, 2017       Who to Call     For medical emergencies, please call 911.  For non-urgent questions about your medical care, please call your primary care provider or clinic, [phone]  For questions related to your surgery, please call your surgery clinic        Attending Provider     Provider Specialty    Ramón Masters MD General Surgery       Primary Care Provider Office Phone # Fax #    Randolph Solomon Mueller [phone]      Your next 10 appointments already scheduled     Feb 20, 2018  9:40 AM CST   Return Visit with Chuy Reddy MD   [facility] ([facility])    [facility] [address]  [address]  [facility] [address] [phone]              Further instructions from your care team         .[email or web address]    You had oxycodone 5mg at 3pm      You received Toradol, an IV form of ibuprofen (Motrin) at 1:30pm.  Do not take any ibuprofen products until 7:30pm.    GENERAL ANESTHESIA OR SEDATION ADULT DISCHARGE INSTRUCTIONS   SPECIAL PRECAUTIONS FOR 24 HOURS AFTER SURGERY    IT IS NOT UNUSUAL TO FEEL LIGHT-HEADED OR FAINT,  UP TO 24 HOURS AFTER SURGERY OR WHILE TAKING PAIN MEDICATION.  IF YOU HAVE THESE SYMPTOMS; SIT FOR A FEW MINUTES BEFORE STANDING AND HAVE SOMEONE ASSIST YOU WHEN YOU GET UP TO WALK OR USE THE BATHROOM.    YOU SHOULD REST AND RELAX FOR THE NEXT 24 HOURS AND YOU MUST MAKE ARRANGEMENTS TO HAVE SOMEONE STAY WITH YOU FOR AT LEAST 24 HOURS AFTER YOUR DISCHARGE.  AVOID HAZARDOUS AND STRENUOUS ACTIVITIES.  DO NOT MAKE IMPORTANT DECISIONS FOR 24 HOURS.    DO NOT DRIVE ANY VEHICLE OR OPERATE MECHANICAL EQUIPMENT FOR 24 HOURS FOLLOWING THE END OF YOUR SURGERY.  EVEN THOUGH YOU MAY FEEL NORMAL, YOUR REACTIONS MAY BE AFFECTED BY THE MEDICATION YOU HAVE RECEIVED.    DO NOT DRINK ALCOHOLIC BEVERAGES FOR 24 HOURS FOLLOWING YOUR SURGERY.    DRINK CLEAR LIQUIDS (APPLE JUICE, GINGER ALE, 7-UP, BROTH, ETC.).  PROGRESS TO YOUR REGULAR DIET AS YOU FEEL ABLE.    YOU MAY HAVE A DRY MOUTH, A SORE THROAT, MUSCLES ACHES OR TROUBLE SLEEPING.  THESE SHOULD GO AWAY AFTER 24 HOURS.    CALL YOUR DOCTOR FOR ANY OF THE FOLLOWING:  SIGNS OF INFECTION (FEVER, GROWING TENDERNESS AT THE SURGERY SITE, A LARGE AMOUNT OF DRAINAGE OR BLEEDING, SEVERE PAIN, FOUL-SMELLING DRAINAGE, REDNESS OR SWELLING.    IT HAS BEEN OVER 8 TO 10 HOURS SINCE SURGERY AND YOU ARE STILL NOT ABLE TO URINATE (PASS WATER).       HOME CARE FOLLOWING INGUINAL/FEMORAL HERNIA REPAIR  SMITA Sheehan, TRAVON Cha D. Maurer, JASPREET Masterson, JOSSE Bradshaw    DIET:  No restrictions.  Increased fluid intake is recommended. While taking pain medications, increase dietary fiber or add a fiber supplementation like Metamucil or Citrucel to help prevent constipation - a possible side effect of pain medications.    NAUSEA:  If nauseated from the anesthetic/pain meds; rest in bed, get up cautiously with assistance, and drink clear liquids (juice, tea, broth).    ACTIVITY:  Light Activity -- you may immediately be up and about as tolerated.  Driving -- you may drive when  comfortable and off narcotic pain medications.  Light Work -- resume when comfortable off pain medications.  (If you can drive, you probably can work.)  Strenuous Work/Activity -- limit lifting to 25 pounds for 3 weeks.  Active Sports (running, biking, etc.) -- cautiously resume after 2weeks.    INCISIONAL CARE:    If you have a dressing in place, keep clean and dry for 48 hours; you may replace the gauze if it becomes soiled.    After 48 hours you may remove the dressing and shower.  Do not submerse incision in water for 1 week.    If you have a Dermabond dressing (a type of skin glue), you may shower immediately.    Sutures will absorb and need not be removed.    If present, leave the steri-strips (white paper tapes) in place for 14 days after surgery.    If present, leave Dermabond glue in place until it wears/flakes off.    Expect a variable amount of swelling/black and blue discoloration that may involve the penis/scrotum or labia.    Some numbness around the incision is common.    A lump/ridge under the incision is normal and will gradually resolve.    DISCOMFORT:  Local anesthetic placed at surgery should provide relief for 4-8 hours.  Begin taking pain pills before discomfort is severe.  Take the pain medication with some food, when possible, to minimize side effects.  Intermittent use of ice packs to the hernia repair site may help during the first 1-3 weeks after surgery.  Expect gradual improvement.    Over-the-counter anti-inflammatory medications (i.e. Ibuprofen/Advil/Motrin or Naprosyn/Aleve) may be used per package instructions in addition to or while tapering off the narcotic pain medications to decrease swelling and sensitivity at the repair site.  DO NOT TAKE these Anti-inflammatory medications if your primary physician has advised against doing so, or if you have acid reflux, ulcer, or bleeding disorder, or take blood-thinner medications.  Call your primary physician or the surgery office if you  have medication questions.    RETURN APPOINTMENT:  Schedule a follow-up visit 2-3 weeks post-op.  Office Phone:  [phone]     CONTACT US IF THE FOLLOWING DEVELOPS:   1. A fever that is above 101     2. If there is a large amount of drainage, bleeding, or swelling.   3. Severe pain that is not relieved by your prescription.   4. Drainage that is thick, cloudy, yellow, green or white.   5. Any other questions not answered by  Frequently Asked Questions  sheet.      FREQUENTLY ASKED QUESTIONS:    Q:  How should my incision look?    A:  Normally your incision will appear slightly swollen with light redness directly along the incision itself as it heals.  It may feel like a bump or ridge as the healing/scarring happens, and over time (3-4 months) this bump or ridge feeling should slowly go away.  In general, clear or pink watery drainage can be normal at first as your incision heals, but should decrease over time.    Q:  How do I know if my incision is infected?  A:  Look at your incision for signs of infection, like redness around the incision spreading to surrounding skin, or drainage of cloudy or foul-smelling drainage.  If you feel warm, check your temperature to see if you are running a fever.    **If any of these things occur, please notify the nurse at our office.  We may need you to come into the office for an incision check.      Q:  How do I take care of my incision?  A:  If you have a dressing in place - Starting the day after surgery, replace the dressing 1-2 times a day until there is no further drainage from the incision.  At that time, a dressing is no longer needed.  Try to minimize tape on the skin if irritation is occurring at the tape sites.  If you have significant irritation from tape on the skin, please call the office to discuss other method of dressing your incision.    Small pieces of tape called  steri-strips  may be present directly overlying your incision; these may be removed 10 days  after surgery unless otherwise specified by your surgeon.  If these tapes start to loosen at the ends, you may trim them back until they fall off or are removed.    A:  If you had  Dermabond  tissue glue used as a dressing (this causes your incision to look shiny with a clear covering over it) - This type of dressing wears off with time and does not require more dressings over the top unless it is draining around the glue as it wears off.  Do not apply ointments or lotions over the incisions until the glue has completely worn off.    Q:  There is a piece of tape or a sticky  lead  still on my skin.  Can I remove this?  A:  Sometimes the sticky  leads  used for monitoring during surgery or for evaluation in the emergency department are not all removed while you are in the hospital.  These sometimes have a tab or metal dot on them.  You can easily remove these on your own, like taking off a band-aid.  If there is a gel substance under the  lead , simply wipe/clean it off with a washcloth or paper towel.      Q:  What can I do to minimize constipation (very hard stools, or lack of stools)?  A:  Stay well hydrated.  Increase your dietary fiber intake or take a fiber supplement -with plenty of water.  Walk around frequently.  You may consider an over-the-counter stool-softener.  Your Pharmacist can assist you with choosing one that is stocked at your pharmacy.  Constipation is also one of the most common side effects of pain medication.  If you are using pain medication, be pro-active and try to PREVENT problems with constipation by taking the steps above BEFORE constipation becomes a problem.    Q:  What do I do if I need more pain medications?  A:  Call the office to receive refills.  Be aware that certain pain meds cannot be called into a pharmacy and actually require a paper prescription.  A change may be made in your pain med as you progress thru your recovery period or if you have side effects to certain meds.     --Pain meds are NOT refilled after 5pm on weekdays, and NOT AT ALL on the weekends, so please look ahead to prevent problems.      Q:  Why am I having a hard time sleeping now that I am at home?  A:  Many medications you receive while you are in the hospital can impact your sleep for a number of days after your surgery/hospitalization.  Decreased level of activity and naps during the day may also make sleeping at night difficult.  Try to minimize day-time naps, and get up frequently during the day to walk around your home during your recovery time.  Sleep aides may be of some help, but are not recommended for long-term use.      Q:  I am having some back discomfort.  What should I do?  A:  This may be related to certain positioning that was required for your surgery, extended periods of time in bed, or other changes in your overall activity level.  You may try ice, heat, acetaminophen, or ibuprofen to treat this temporarily.  Note that many pain medications have acetaminophen in them and would state this on the prescription bottle.  Be sure not to exceed the maximum of 4000mg per day of acetaminophen.     **If the pain you are having does not resolve, is severe, or is a flare of back pain you have had on other occasions prior to surgery, please contact your primary physician for further recommendations or for an appointment to be examined at their office.    Q:  Why am I having headaches?  A:  Headaches can be caused by many things:  caffeine withdrawal, use of pain meds, dehydration, high blood pressure, lack of sleep, over-activity/exhaustion, flare-up of usual migraine headaches.  If you feel this is related to muscle tension (a band-like feeling around the head, or a pressure at the low-back of the head) you may try ice or heat to this area.  You may need to drink more fluids (try electrolyte drink like Gatorade), rest, or take your usual migraine medications.   **If your headaches do not resolve, worsen, are  "accompanied by other symptoms, or if your blood pressure is high, please call your primary physician for recommendation and/or examination.    Q:  I am unable to urinate.  What do I do?  A:  A small percentage of people can have difficulty urinating initially after surgery.  This includes being able to urinate only a very small amount at a time and feeling discomfort or pressure in the very low abdomen.  This is called  urinary retention , and is actually an urgent situation.  Proceed to your nearest Emergency department for evaluation (not an Urgent Care Center).  Sometimes the bladder does not work correctly after certain medications you receive during surgery, or related to certain procedures.  You may need to have a catheter placed until your bladder recovers.  When planning to go to an Emergency department, it may help to call the ER to let them know you are coming in for this problem after a surgery.  This may help you get in quicker to be evaluated.  **If you have symptoms of a urinary tract infection, please contact your primary physician for the proper evaluation and treatment.          If you have other questions, please call the office Monday thru Friday between 8am and 5pm to discuss with the nurse or physician assistant.  #[phone]    There is a surgeon ON CALL on weekday evenings and over the weekend in case of urgent need only, and may be contacted at the same number.    If you are having an emergency, call 911 or proceed to your nearest emergency department.            Pending Results     No orders found from 12/13/2017 to 12/16/2017.            Admission Information     Date & Time Provider Department Dept. Phone    12/15/2017 Ramón Masters MD [facility] PreOP/PostOP [phone]      Your Vitals Were     Blood Pressure Pulse Temperature Respirations Height Weight    125/71 64 97.7  F (36.5  C) (Temporal) 13 1.803 m (5' 11\") 76.2 kg (168 lb)    Pulse Oximetry BMI (Body Mass Index)    "             98% 23.43 kg/m2          [employer] Information     [employer] gives you secure access to your electronic health record. If you see a primary care provider, you can also send messages to your care team and make appointments. If you have questions, please call your primary care clinic.  If you do not have a primary care provider, please call [phone] and they will assist you.        Care EveryWhere ID     This is your Care EveryWhere ID. This could be used by other organizations to access your [facility] medical records  [id number]        Equal Access to Services     MAYDA TEJADA : Hadii mauricio hartman hadasho Soomaali, waaxda luqadaha, qaybta kaalmada adebraydonyajonn, romain forbes . [facility] [phone].    ATENCIÓN: Si habla español, tiene a solis disposición servicios gratuitos de asistencia lingüística. Llame al [phone].    We comply with applicable federal civil rights laws and [place] laws. We do not discriminate on the basis of race, color, national origin, age, disability, sex, sexual orientation, or gender identity.               Review of your medicines      UNREVIEWED medicines. Ask your doctor about these medicines        Dose / Directions    ADVIL PO        Dose:  400 mg   Take 400 mg by mouth every 6 hours as needed for moderate pain   Refills:  0       * CIALIS PO        Refills:  0       * tadalafil 5 MG tablet   Commonly known as:  CIALIS   Used for:  Prostate cancer (H)        Dose:  5 mg   Take 1 tablet (5 mg) by mouth daily Never use with nitroglycerin, terazosin or doxazosin.   Quantity:  30 tablet   Refills:  6       * tadalafil 5 MG tablet   Commonly known as:  CIALIS   Used for:  ED (erectile dysfunction)        Dose:  5 mg   Take 1 tablet (5 mg) by mouth daily as needed Do not use with nitroglycerin, terazosin or doxazosin.   Quantity:  30 tablet   Refills:  3       * Notice:  This list has 3 medication(s) that are the same as other medications prescribed for you.  Read the directions carefully, and ask your doctor or other care provider to review them with you.      START taking        Dose / Directions    oxyCODONE IR 5 MG tablet   Commonly known as:  ROXICODONE   Used for:  Left inguinal hernia        Dose:  5-10 mg   Take 1-2 tablets (5-10 mg) by mouth every 3 hours as needed for pain or other (Moderate to Severe)   Quantity:  30 tablet   Refills:  0            Where to get your medicines      Some of these will need a paper prescription and others can be bought over the counter. Ask your nurse if you have questions.     Bring a paper prescription for each of these medications     oxyCODONE IR 5 MG tablet                Protect others around you: Learn how to safely use, store and throw away your medicines at www.disposemymeds.org.             Medication List: This is a list of all your medications and when to take them. Check marks below indicate your daily home schedule. Keep this list as a reference.      Medications           Morning Afternoon Evening Bedtime As Needed    ADVIL PO   Take 400 mg by mouth every 6 hours as needed for moderate pain                                oxyCODONE IR 5 MG tablet   Commonly known as:  ROXICODONE   Take 1-2 tablets (5-10 mg) by mouth every 3 hours as needed for pain or other (Moderate to Severe)   Last time this was given:  5 mg on 12/15/2017  3:05 PM                                * CIALIS PO                                * tadalafil 5 MG tablet   Commonly known as:  CIALIS   Take 1 tablet (5 mg) by mouth daily Never use with nitroglycerin, terazosin or doxazosin.                                * tadalafil 5 MG tablet   Commonly known as:  CIALIS   Take 1 tablet (5 mg) by mouth daily as needed Do not use with nitroglycerin, terazosin or doxazosin.                                * Notice:  This list has 3 medication(s) that are the same as other medications prescribed for you. Read the directions carefully, and ask your doctor or  other care provider to review them with you.

## 2017-12-15 NOTE — ANESTHESIA PREPROCEDURE EVALUATION
Anesthesia Evaluation     . Pt has had prior anesthetic. Type: General    No history of anesthetic complications          ROS/MED HX    ENT/Pulmonary:  - neg pulmonary ROS    (-) tobacco use, asthma, COPD, JACK risk factors and recent URI   Neurologic:  - neg neurologic ROS    (-) seizures and CVA   Cardiovascular:     (+) Dyslipidemia, ----. : . . . :. .      (-) hypertension, CAD, arrhythmias and valvular problems/murmurs   METS/Exercise Tolerance:     Hematologic:  - neg hematologic  ROS       Musculoskeletal:  - neg musculoskeletal ROS       GI/Hepatic:     (+) Other GI/Hepatic diverticulitis     (-) GERD, hepatitis and liver disease   Renal/Genitourinary:  - ROS Renal section negative       Endo:  - neg endo ROS    (-) Type I DM, Type II DM, thyroid disease, chronic steroid usage and obesity   Psychiatric:  - neg psychiatric ROS       Infectious Disease:  - neg infectious disease ROS       Malignancy:   (+) Malignancy History of Prostate          Other:    - neg other ROS                 Physical Exam  Normal systems: cardiovascular, pulmonary and dental    Airway   Mallampati: II  TM distance: >3 FB  Neck ROM: full    Dental     Cardiovascular   Rhythm and rate: regular and normal  (-) no friction rub, no systolic click and no murmur    Pulmonary    breath sounds clear to auscultation(-) no rhonchi, no decreased breath sounds, no wheezes, no rales and no stridor                    Anesthesia Plan      History & Physical Review  History and physical reviewed and following examination; no interval change.    ASA Status:  2 .    NPO Status:  > 8 hours    Plan for General and LMA with Intravenous induction. Maintenance will be Balanced.    PONV prophylaxis:  Ondansetron (or other 5HT-3) and Dexamethasone or Solumedrol       Postoperative Care  Postoperative pain management:  IV analgesics.      Consents  Anesthetic plan, risks, benefits and alternatives discussed with:  Patient or representative and Patient..                           .

## 2017-12-15 NOTE — DISCHARGE INSTRUCTIONS
.rhsdsba    You had oxycodone 5mg at 3pm      You received Toradol, an IV form of ibuprofen (Motrin) at 1:30pm.  Do not take any ibuprofen products until 7:30pm.    GENERAL ANESTHESIA OR SEDATION ADULT DISCHARGE INSTRUCTIONS   SPECIAL PRECAUTIONS FOR 24 HOURS AFTER SURGERY    IT IS NOT UNUSUAL TO FEEL LIGHT-HEADED OR FAINT, UP TO 24 HOURS AFTER SURGERY OR WHILE TAKING PAIN MEDICATION.  IF YOU HAVE THESE SYMPTOMS; SIT FOR A FEW MINUTES BEFORE STANDING AND HAVE SOMEONE ASSIST YOU WHEN YOU GET UP TO WALK OR USE THE BATHROOM.    YOU SHOULD REST AND RELAX FOR THE NEXT 24 HOURS AND YOU MUST MAKE ARRANGEMENTS TO HAVE SOMEONE STAY WITH YOU FOR AT LEAST 24 HOURS AFTER YOUR DISCHARGE.  AVOID HAZARDOUS AND STRENUOUS ACTIVITIES.  DO NOT MAKE IMPORTANT DECISIONS FOR 24 HOURS.    DO NOT DRIVE ANY VEHICLE OR OPERATE MECHANICAL EQUIPMENT FOR 24 HOURS FOLLOWING THE END OF YOUR SURGERY.  EVEN THOUGH YOU MAY FEEL NORMAL, YOUR REACTIONS MAY BE AFFECTED BY THE MEDICATION YOU HAVE RECEIVED.    DO NOT DRINK ALCOHOLIC BEVERAGES FOR 24 HOURS FOLLOWING YOUR SURGERY.    DRINK CLEAR LIQUIDS (APPLE JUICE, GINGER ALE, 7-UP, BROTH, ETC.).  PROGRESS TO YOUR REGULAR DIET AS YOU FEEL ABLE.    YOU MAY HAVE A DRY MOUTH, A SORE THROAT, MUSCLES ACHES OR TROUBLE SLEEPING.  THESE SHOULD GO AWAY AFTER 24 HOURS.    CALL YOUR DOCTOR FOR ANY OF THE FOLLOWING:  SIGNS OF INFECTION (FEVER, GROWING TENDERNESS AT THE SURGERY SITE, A LARGE AMOUNT OF DRAINAGE OR BLEEDING, SEVERE PAIN, FOUL-SMELLING DRAINAGE, REDNESS OR SWELLING.    IT HAS BEEN OVER 8 TO 10 HOURS SINCE SURGERY AND YOU ARE STILL NOT ABLE TO URINATE (PASS WATER).       HOME CARE FOLLOWING INGUINAL/FEMORAL HERNIA REPAIR  SMITA Sheehan E. Gavin, N. Guttormson, D. Maurer, R. O Donnell, L. Thomas    DIET:  No restrictions.  Increased fluid intake is recommended. While taking pain medications, increase dietary fiber or add a fiber supplementation like Metamucil or Citrucel to help prevent  constipation - a possible side effect of pain medications.    NAUSEA:  If nauseated from the anesthetic/pain meds; rest in bed, get up cautiously with assistance, and drink clear liquids (juice, tea, broth).    ACTIVITY:  Light Activity -- you may immediately be up and about as tolerated.  Driving -- you may drive when comfortable and off narcotic pain medications.  Light Work -- resume when comfortable off pain medications.  (If you can drive, you probably can work.)  Strenuous Work/Activity -- limit lifting to 25 pounds for 3 weeks.  Active Sports (running, biking, etc.) -- cautiously resume after 2weeks.    INCISIONAL CARE:    If you have a dressing in place, keep clean and dry for 48 hours; you may replace the gauze if it becomes soiled.    After 48 hours you may remove the dressing and shower.  Do not submerse incision in water for 1 week.    If you have a Dermabond dressing (a type of skin glue), you may shower immediately.    Sutures will absorb and need not be removed.    If present, leave the steri-strips (white paper tapes) in place for 14 days after surgery.    If present, leave Dermabond glue in place until it wears/flakes off.    Expect a variable amount of swelling/black and blue discoloration that may involve the penis/scrotum or labia.    Some numbness around the incision is common.    A lump/ridge under the incision is normal and will gradually resolve.    DISCOMFORT:  Local anesthetic placed at surgery should provide relief for 4-8 hours.  Begin taking pain pills before discomfort is severe.  Take the pain medication with some food, when possible, to minimize side effects.  Intermittent use of ice packs to the hernia repair site may help during the first 1-3 weeks after surgery.  Expect gradual improvement.    Over-the-counter anti-inflammatory medications (i.e. Ibuprofen/Advil/Motrin or Naprosyn/Aleve) may be used per package instructions in addition to or while tapering off the narcotic pain  medications to decrease swelling and sensitivity at the repair site.  DO NOT TAKE these Anti-inflammatory medications if your primary physician has advised against doing so, or if you have acid reflux, ulcer, or bleeding disorder, or take blood-thinner medications.  Call your primary physician or the surgery office if you have medication questions.    RETURN APPOINTMENT:  Schedule a follow-up visit 2-3 weeks post-op.  Office Phone:  [phone]     CONTACT US IF THE FOLLOWING DEVELOPS:   1. A fever that is above 101     2. If there is a large amount of drainage, bleeding, or swelling.   3. Severe pain that is not relieved by your prescription.   4. Drainage that is thick, cloudy, yellow, green or white.   5. Any other questions not answered by  Frequently Asked Questions  sheet.      FREQUENTLY ASKED QUESTIONS:    Q:  How should my incision look?    A:  Normally your incision will appear slightly swollen with light redness directly along the incision itself as it heals.  It may feel like a bump or ridge as the healing/scarring happens, and over time (3-4 months) this bump or ridge feeling should slowly go away.  In general, clear or pink watery drainage can be normal at first as your incision heals, but should decrease over time.    Q:  How do I know if my incision is infected?  A:  Look at your incision for signs of infection, like redness around the incision spreading to surrounding skin, or drainage of cloudy or foul-smelling drainage.  If you feel warm, check your temperature to see if you are running a fever.    **If any of these things occur, please notify the nurse at our office.  We may need you to come into the office for an incision check.      Q:  How do I take care of my incision?  A:  If you have a dressing in place - Starting the day after surgery, replace the dressing 1-2 times a day until there is no further drainage from the incision.  At that time, a dressing is no longer needed.  Try to minimize  tape on the skin if irritation is occurring at the tape sites.  If you have significant irritation from tape on the skin, please call the office to discuss other method of dressing your incision.    Small pieces of tape called  steri-strips  may be present directly overlying your incision; these may be removed 10 days after surgery unless otherwise specified by your surgeon.  If these tapes start to loosen at the ends, you may trim them back until they fall off or are removed.    A:  If you had  Dermabond  tissue glue used as a dressing (this causes your incision to look shiny with a clear covering over it) - This type of dressing wears off with time and does not require more dressings over the top unless it is draining around the glue as it wears off.  Do not apply ointments or lotions over the incisions until the glue has completely worn off.    Q:  There is a piece of tape or a sticky  lead  still on my skin.  Can I remove this?  A:  Sometimes the sticky  leads  used for monitoring during surgery or for evaluation in the emergency department are not all removed while you are in the hospital.  These sometimes have a tab or metal dot on them.  You can easily remove these on your own, like taking off a band-aid.  If there is a gel substance under the  lead , simply wipe/clean it off with a washcloth or paper towel.      Q:  What can I do to minimize constipation (very hard stools, or lack of stools)?  A:  Stay well hydrated.  Increase your dietary fiber intake or take a fiber supplement -with plenty of water.  Walk around frequently.  You may consider an over-the-counter stool-softener.  Your Pharmacist can assist you with choosing one that is stocked at your pharmacy.  Constipation is also one of the most common side effects of pain medication.  If you are using pain medication, be pro-active and try to PREVENT problems with constipation by taking the steps above BEFORE constipation becomes a problem.    Q:  What  do I do if I need more pain medications?  A:  Call the office to receive refills.  Be aware that certain pain meds cannot be called into a pharmacy and actually require a paper prescription.  A change may be made in your pain med as you progress thru your recovery period or if you have side effects to certain meds.    --Pain meds are NOT refilled after 5pm on weekdays, and NOT AT ALL on the weekends, so please look ahead to prevent problems.      Q:  Why am I having a hard time sleeping now that I am at home?  A:  Many medications you receive while you are in the hospital can impact your sleep for a number of days after your surgery/hospitalization.  Decreased level of activity and naps during the day may also make sleeping at night difficult.  Try to minimize day-time naps, and get up frequently during the day to walk around your home during your recovery time.  Sleep aides may be of some help, but are not recommended for long-term use.      Q:  I am having some back discomfort.  What should I do?  A:  This may be related to certain positioning that was required for your surgery, extended periods of time in bed, or other changes in your overall activity level.  You may try ice, heat, acetaminophen, or ibuprofen to treat this temporarily.  Note that many pain medications have acetaminophen in them and would state this on the prescription bottle.  Be sure not to exceed the maximum of 4000mg per day of acetaminophen.     **If the pain you are having does not resolve, is severe, or is a flare of back pain you have had on other occasions prior to surgery, please contact your primary physician for further recommendations or for an appointment to be examined at their office.    Q:  Why am I having headaches?  A:  Headaches can be caused by many things:  caffeine withdrawal, use of pain meds, dehydration, high blood pressure, lack of sleep, over-activity/exhaustion, flare-up of usual migraine headaches.  If you feel this  is related to muscle tension (a band-like feeling around the head, or a pressure at the low-back of the head) you may try ice or heat to this area.  You may need to drink more fluids (try electrolyte drink like Gatorade), rest, or take your usual migraine medications.   **If your headaches do not resolve, worsen, are accompanied by other symptoms, or if your blood pressure is high, please call your primary physician for recommendation and/or examination.    Q:  I am unable to urinate.  What do I do?  A:  A small percentage of people can have difficulty urinating initially after surgery.  This includes being able to urinate only a very small amount at a time and feeling discomfort or pressure in the very low abdomen.  This is called  urinary retention , and is actually an urgent situation.  Proceed to your nearest Emergency department for evaluation (not an Urgent Care Center).  Sometimes the bladder does not work correctly after certain medications you receive during surgery, or related to certain procedures.  You may need to have a catheter placed until your bladder recovers.  When planning to go to an Emergency department, it may help to call the ER to let them know you are coming in for this problem after a surgery.  This may help you get in quicker to be evaluated.  **If you have symptoms of a urinary tract infection, please contact your primary physician for the proper evaluation and treatment.          If you have other questions, please call the office Monday thru Friday between 8am and 5pm to discuss with the nurse or physician assistant.  #[phone]    There is a surgeon ON CALL on weekday evenings and over the weekend in case of urgent need only, and may be contacted at the same number.    If you are having an emergency, call 911 or proceed to your nearest emergency department.

## 2017-12-26 ENCOUNTER — OFFICE VISIT (OUTPATIENT)
Dept: SURGERY | Facility: CLINIC | Age: 57
End: 2017-12-26
Payer: COMMERCIAL

## 2017-12-26 VITALS
OXYGEN SATURATION: 96 % | DIASTOLIC BLOOD PRESSURE: 60 MMHG | SYSTOLIC BLOOD PRESSURE: 142 MMHG | RESPIRATION RATE: 16 BRPM | WEIGHT: 168 LBS | HEIGHT: 71 IN | BODY MASS INDEX: 23.52 KG/M2 | HEART RATE: 80 BPM

## 2017-12-26 DIAGNOSIS — Z09 SURGICAL FOLLOWUP VISIT: Primary | ICD-10-CM

## 2017-12-26 PROCEDURE — 99024 POSTOP FOLLOW-UP VISIT: CPT | Performed by: PHYSICIAN ASSISTANT

## 2017-12-26 NOTE — PROGRESS NOTES
2017    Re:  Yogi Moreland   :  1960      Dear Dr. Mueller,    I had the pleasure of seeing Yogi today in follow-up after his left inguinal  hernia repair with mesh on 12/15/2017 by Dr. Msaters. The patient tolerated the procedure well. Yogi is happy to report that his preoperative symptoms have improved. He is now tolerating a regular diet and having normal bowel movements. He denies abdominal pain today. His primary concern is bruising which he reports extends down into the left side of his scrotum.    On exam, the patient's incision is healing well without signs of infection. A normal healing ridge is present, as expected and dermabond skin glue is partially intact.  His abdomen is soft and nontender. Bruising and ecchymosis is present on the left side of the scrotum, minimal tenderness and mild swelling present.     Yogi is recovering well postoperatively. We will be happy to see him in the future as needed. He was encouraged to call us with any questions or concerns.      Sincerely,           Melba Crawford PA-C      Please route or send letter to:  Primary Care Provider (PCP)

## 2017-12-26 NOTE — LETTER
2017     Re:  Yogi Moreland   :  1960        Dear Dr. Mueller,     I had the pleasure of seeing Yogi today in follow-up after his left inguinal  hernia repair with mesh on 12/15/2017 by Dr. Masters. The patient tolerated the procedure well. Yogi is happy to report that his preoperative symptoms have improved. He is now tolerating a regular diet and having normal bowel movements. He denies abdominal pain today. His primary concern is bruising which he reports extends down into the left side of his scrotum.     On exam, the patient's incision is healing well without signs of infection. A normal healing ridge is present, as expected and dermabond skin glue is partially intact.  His abdomen is soft and nontender. Bruising and ecchymosis is present on the left side of the scrotum, minimal tenderness and mild swelling present.      Yogi is recovering well postoperatively. We will be happy to see him in the future as needed. He was encouraged to call us with any questions or concerns.       Sincerely,   Melba Crawford PA-C

## 2017-12-26 NOTE — MR AVS SNAPSHOT
After Visit Summary   12/26/2017    Yogi Moreland    MRN: [id number]           Patient Information     Date Of Birth          1960        Visit Information        Provider Department      12/26/2017 11:00 AM Melba Crawford PA-C [facility]      Today's Diagnoses     Surgical followup visit    -  1       Follow-ups after your visit        Your next 10 appointments already scheduled     Feb 20, 2018  9:40 AM CST   Return Visit with Chuy Reddy MD   [facility] ([facility])    [address]  [address]  [facility] [address] [phone]              Who to contact     If you have questions or need follow up information about today's clinic visit or your schedule please contact [facility] directly at [phone].  Normal or non-critical lab and imaging results will be communicated to you by MyChart, letter or phone within 4 business days after the clinic has received the results. If you do not hear from us within 7 days, please contact the clinic through [employer] or phone. If you have a critical or abnormal lab result, we will notify you by phone as soon as possible.  Submit refill requests through [employer] or call your pharmacy and they will forward the refill request to us. Please allow 3 business days for your refill to be completed.          Additional Information About Your Visit        MyChart Information     [employer] gives you secure access to your electronic health record. If you see a primary care provider, you can also send messages to your care team and make appointments. If you have questions, please call your primary care clinic.  If you do not have a primary care provider, please call [phone] and they will assist you.        Care EveryWhere ID     This is your Care EveryWhere ID. This could be used by other  "organizations to access your [place] medical records  [id number]        Your Vitals Were     Pulse Respirations Height Pulse Oximetry BMI (Body Mass Index)       80 16 5' 11\" (1.803 m) 96% 23.43 kg/m2        Blood Pressure from Last 3 Encounters:   12/26/17 142/60   12/15/17 137/81   12/12/17 122/74    Weight from Last 3 Encounters:   12/26/17 168 lb (76.2 kg)   12/15/17 168 lb (76.2 kg)   12/12/17 169 lb 1.6 oz (76.7 kg)              Today, you had the following     No orders found for display       Primary Care Provider Office Phone # Fax #    Randolph Mueller [phone] [address] [facility] [address]        Equal Access to Services     [facility] : Hadii mauricio hartman hadasho Sojohnathan, waaxda luqadaha, qaybta kaalmada adeegyada, romain forbes . So [facility] [phone].    ATENCIÓN: Si habla español, tiene a solis disposición servicios gratuitos de asistencia lingüística. [facility] [phone].    We comply with applicable federal civil rights laws and [place] laws. We do not discriminate on the basis of race, color, national origin, age, disability, sex, sexual orientation, or gender identity.            Thank you!     Thank you for choosing [facility]  for your care. Our goal is always to provide you with excellent care. Hearing back from our patients is one way we can continue to improve our services. Please take a few minutes to complete the written survey that you may receive in the mail after your visit with us. Thank you!             Your Updated Medication List - Protect others around you: Learn how to safely use, store and throw away your medicines at www.disposemymeds.org.          This list is accurate as of: 12/26/17 11:15 AM.  Always use your most recent med list.                   Brand Name Dispense Instructions for use Diagnosis    ADVIL PO      Take 400 mg by mouth every 6 hours as needed for moderate pain        " oxyCODONE IR 5 MG tablet    ROXICODONE    30 tablet    Take 1-2 tablets (5-10 mg) by mouth every 3 hours as needed for pain or other (Moderate to Severe)    Left inguinal hernia       * CIALIS PO           * tadalafil 5 MG tablet    CIALIS    30 tablet    Take 1 tablet (5 mg) by mouth daily Never use with nitroglycerin, terazosin or doxazosin.    Prostate cancer (H)       * tadalafil 5 MG tablet    CIALIS    30 tablet    Take 1 tablet (5 mg) by mouth daily as needed Do not use with nitroglycerin, terazosin or doxazosin.    ED (erectile dysfunction)       * Notice:  This list has 3 medication(s) that are the same as other medications prescribed for you. Read the directions carefully, and ask your doctor or other care provider to review them with you.

## 2018-02-15 DIAGNOSIS — C61 MALIGNANT NEOPLASM OF PROSTATE (H): ICD-10-CM

## 2018-02-15 LAB — PSA SERPL-MCNC: <0.01 UG/L (ref 0–4)

## 2018-02-15 PROCEDURE — 84153 ASSAY OF PSA TOTAL: CPT | Performed by: FAMILY MEDICINE

## 2018-02-15 PROCEDURE — 36415 COLL VENOUS BLD VENIPUNCTURE: CPT | Performed by: FAMILY MEDICINE

## 2018-02-20 ENCOUNTER — OFFICE VISIT (OUTPATIENT)
Dept: UROLOGY | Facility: CLINIC | Age: 58
End: 2018-02-20
Payer: COMMERCIAL

## 2018-02-20 VITALS — WEIGHT: 168 LBS | HEIGHT: 71 IN | BODY MASS INDEX: 23.52 KG/M2 | HEART RATE: 94 BPM | OXYGEN SATURATION: 96 %

## 2018-02-20 DIAGNOSIS — C61 MALIGNANT NEOPLASM OF PROSTATE (H): Primary | ICD-10-CM

## 2018-02-20 PROCEDURE — 99213 OFFICE O/P EST LOW 20 MIN: CPT | Performed by: UROLOGY

## 2018-02-20 ASSESSMENT — PAIN SCALES - GENERAL: PAINLEVEL: NO PAIN (0)

## 2018-02-20 NOTE — LETTER
2/20/2018       RE: Yogi Moreland  [address] [address] [address]     Dear Colleague,    Thank you for referring your patient, Yogi Moreland, to the [facility] [place] [facility]. Please see a copy of my visit note below.    Yogi Moreland is a 57-year-old male who underwent radical retropubic prostatectomy with bilateral pelvic lymph node dissection in July last year. His final pathology revealed grade 3+4 disease with negative surgical margins and negative lymph nodes.  He returns today with an undetectable PSA, excellent urinary control. He is taking Cialis 5 mg twice a week for penile rehabilitation. He is having no erections.  Other past medical history: Recent left inguinal herniorrhaphy, sigmoidectomy for diverticulitis, hyperlipidemia, colonic polyps, nonsmoker  Medications: Ibuprofen, Cialis  Allergies: None  Exam: Normal appearance, normal vital signs, alert and oriented, normocephalic, normal respirations, neuro grossly intact  Assessment: Prostate cancer-no evidence of disease, erectile dysfunction-discussed injection therapy, 12-18 months recovery time after surgery   Plan: Continue Cialis 5 mg every 2 weeks, see me in 3 months with PSA    Again, thank you for allowing me to participate in the care of your patient.      Sincerely,    Chuy Reddy MD

## 2018-02-20 NOTE — NURSING NOTE
Pt here for psa results.  Pt denies any voiding trouble.  No ua at this time.  SMITA Quiñones, CMA

## 2018-03-13 ENCOUNTER — TELEPHONE (OUTPATIENT)
Dept: FAMILY MEDICINE | Facility: CLINIC | Age: 58
End: 2018-03-13

## 2018-03-13 DIAGNOSIS — Z86.0100 HISTORY OF COLONIC POLYPS: Primary | ICD-10-CM

## 2018-03-13 NOTE — TELEPHONE ENCOUNTER
Ph. [phone] able to leave message.  Please call once placed      referral for routine colonoscopy.  Patient stating he has hx of lower abd issues.      Audrey Murguia

## 2018-03-22 ENCOUNTER — HOSPITAL ENCOUNTER (OUTPATIENT)
Facility: CLINIC | Age: 58
Discharge: HOME | End: 2018-03-22
Attending: INTERNAL MEDICINE | Admitting: INTERNAL MEDICINE
Payer: COMMERCIAL

## 2018-03-22 VITALS
RESPIRATION RATE: 16 BRPM | SYSTOLIC BLOOD PRESSURE: 112 MMHG | DIASTOLIC BLOOD PRESSURE: 76 MMHG | OXYGEN SATURATION: 97 %

## 2018-03-22 LAB — COLONOSCOPY: NORMAL

## 2018-03-22 PROCEDURE — G0105 COLORECTAL SCRN; HI RISK IND: HCPCS | Performed by: INTERNAL MEDICINE

## 2018-03-22 PROCEDURE — G0500 MOD SEDAT ENDO SERVICE >5YRS: HCPCS | Performed by: INTERNAL MEDICINE

## 2018-03-22 PROCEDURE — 25000128 H RX IP 250 OP 636: Performed by: INTERNAL MEDICINE

## 2018-03-22 PROCEDURE — 45378 DIAGNOSTIC COLONOSCOPY: CPT | Performed by: INTERNAL MEDICINE

## 2018-03-22 RX ORDER — ONDANSETRON 2 MG/ML
4 INJECTION INTRAMUSCULAR; INTRAVENOUS
Status: DISCONTINUED | OUTPATIENT
Start: 2018-03-22 | End: 2018-03-22 | Stop reason: HOSPADM

## 2018-03-22 RX ORDER — FLUMAZENIL 0.1 MG/ML
0.2 INJECTION, SOLUTION INTRAVENOUS
Status: DISCONTINUED | OUTPATIENT
Start: 2018-03-22 | End: 2018-03-22 | Stop reason: HOSPADM

## 2018-03-22 RX ORDER — LIDOCAINE 40 MG/G
CREAM TOPICAL
Status: DISCONTINUED | OUTPATIENT
Start: 2018-03-22 | End: 2018-03-22 | Stop reason: HOSPADM

## 2018-03-22 RX ORDER — NALOXONE HYDROCHLORIDE 0.4 MG/ML
.1-.4 INJECTION, SOLUTION INTRAMUSCULAR; INTRAVENOUS; SUBCUTANEOUS
Status: DISCONTINUED | OUTPATIENT
Start: 2018-03-22 | End: 2018-03-22 | Stop reason: HOSPADM

## 2018-03-22 RX ORDER — ONDANSETRON 4 MG/1
4 TABLET, ORALLY DISINTEGRATING ORAL EVERY 6 HOURS PRN
Status: DISCONTINUED | OUTPATIENT
Start: 2018-03-22 | End: 2018-03-22 | Stop reason: HOSPADM

## 2018-03-22 RX ORDER — FENTANYL CITRATE 50 UG/ML
INJECTION, SOLUTION INTRAMUSCULAR; INTRAVENOUS PRN
Status: DISCONTINUED | OUTPATIENT
Start: 2018-03-22 | End: 2018-03-22 | Stop reason: HOSPADM

## 2018-03-22 RX ORDER — ONDANSETRON 2 MG/ML
4 INJECTION INTRAMUSCULAR; INTRAVENOUS EVERY 6 HOURS PRN
Status: DISCONTINUED | OUTPATIENT
Start: 2018-03-22 | End: 2018-03-22 | Stop reason: HOSPADM

## 2018-03-22 NOTE — LETTER
March 14, 2018      Yogi Moreland  [address] [facility] [address]        Dear Yogi,         Thank you for choosing [facility]. You are scheduled for the following service.     Date:  March 22, 2018 - Thursday             Procedure:  COLONOSCOPY  Doctor:        Chris Barrios   Arrival Time:  09:00 am  *check in at Emergency/Endoscopy desk*  Procedure Time:  09:30 am    Location:   [facility]        Endoscopy Department, First Floor (Enter through ER Doors) *        [address] [place], [place] [address]      [phone] or [phone] () to reschedule      MIRALAX -GATORADE  PREP  Colonoscopy is the most accurate test to detect colon polyps and colon cancer; and the only test where polyps can be removed. During this procedure, a doctor examines the lining of your large intestine and rectum through a flexible tube.           Transportation  Arrange for a ride for the day of your procedure with a responsible adult.  A taxi ride is not an option unless you are accompanied by a responsible adult. If you fail to arrange transportation with a responsible adult, your procedure will be cancelled and rescheduled.    Purchase the  following supplies at your local pharmacy:  - 2 (two) bisacodyl tablets: each tablet contains 5 mg.  (Dulcolax  laxative NOT Dulcolax  stool softener)   - 1 (one) 8.3 oz bottle of Polyethylene Glycol (PEG) 3350 Powder   (MiraLAX , Smooth LAX , ClearLAX  or equivalent)  - 64 oz Gatorade    Regular Gatorade, Gatorade G2 , Powerade , Powerade Zero  or Pedialyte  is acceptable. Red colored flavors are not allowed; all other colors (yellow, green, orange, purple and blue) are okay. It is also okay to buy two 2.12 oz packets of powdered Gatorade that can be mixed with water to a total volume of 64 oz of liquid.  - 1 (one) 10 oz bottle of Magnesium Citrate (Red colored flavors are not allowed)  It is also okay for you to use a  0.5 oz package of powdered magnesium citrate (17 g) mixed with 10 oz of water.    PREPARATION FOR COLONOSCOPY    7 days before:    Discontinue fiber supplements and medications containing iron. This includes Metamucil  and Fibercon ; and multivitamins with iron.  3 days before:    Begin a low-fiber diet. A low-fiber diet helps making the cleanout more effective.     Examples of a low-fiber diet include (but are not limited to): white bread, white rice, pasta, crackers, fish, chicken, eggs, ground beef, creamy peanut butter, cooked/steamed/boiled vegetables, canned fruit, bananas, melons, milk, plain yogurt cheese, salad dressing and other condiments.     The following are not allowed on a low-fiber diet: seeds, nuts, popcorn, bran, whole wheat, corn, quinoa, raw fruits and vegetables, berries and dried fruit, beans and lentils.    For additional details on low-fiber diet, please refer to the table on the last page.  2 days before:    Continue the low-fiber diet.     Drink at least 8 glasses of water throughout the day.     Stop eating solid foods at 11:45 pm.  1 day before:    In the morning: begin a clear liquid diet (liquids you can see through).     Examples of a clear liquid diet include: water, clear broth or bouillon, Gatorade, Pedialyte or Powerade, carbonated and non-carbonated soft drinks (Sprite , 7-Up , ginger ale), strained fruit juices without pulp (apple, white grape, white cranberry), Jell-O  and popsicles.     The following are not allowed on a clear liquid diet: red liquids, alcoholic beverages, coffee, dairy products (milk, creamer, and yogurt), protein shakes, creamy broths, juice with pulp and chewing tobacco.    At noon: take 2 (two) bisacodyl tablets     At 4 (and no later than 6pm): start drinking the Miralax-Gatorade preparation (8.3 oz of Miralax mixed with 64 oz of Gatorade in a large pitcher). Drink 1(one) 8 oz glass every 15 minutes thereafter, until the mixture is gone.    COLON  CLEANSING TIPS: drink adequate amounts of fluids before and after your colon cleansing to prevent dehydration. Stay near a toilet because you will have diarrhea. Even if you are sitting on the toilet, continue to drink the cleansing solution every 15 minutes. If you feel nauseous or vomit, rinse your mouth with water, take a 15 to 30-minute-break and then continue drinking the solution. You will be uncomfortable until the stool has flushed from your colon (in about 2 to 4 hours). You may feel chilled.              Day of your procedure  You may take all of your morning medications including blood pressure medications, blood thinners (if you have not been instructed to stop these by our office), methadone, anti-seizure medications with sips of water 3 hours prior to your procedure or earlier. Do not take insulin or vitamins prior to your procedure. Continue the clear liquid diet.   4 hours prior: drink 10 oz of magnesium citrate. It may be easier to drink it with a straw.    STOP consuming all liquids after that.     Do not take anything by mouth during this time.     Allow extra time to travel to your procedure as you may need to stop and use a restroom along the way.  You are ready for the procedure, if you followed all instructions and your stool is no longer formed, but clear or yellow liquid. If you are unsure whether your colon is clean, please call our office at [phone] before you leave for your appointment.  Bring the following to your procedure:  - Insurance Card/Photo ID.   - List of current medications including over-the-counter medications and supplements.   - Your rescue inhaler if you currently use one to control asthma.      Canceling or rescheduling your appointment:   If you must cancel or reschedule your appointment, please call [phone] as soon as possible.      COLONOSCOPY PRE-PROCEDURE CHECKLIST  If you have diabetes, ask your regular doctor for diet and medication restrictions.  If you  take an anticoagulant or anti-platelet medication (such as Coumadin , Lovenox , Pradaxa , Xarelto , Eliquis , etc.), please call your primary doctor for advice on holding this medication.  If you take aspirin you may continue to do so.  If you are or may be pregnant, please discuss the risks and benefits of this procedure with your doctor.          What happens during a colonoscopy?    Plan to spend up to two hours, starting at registration time, at the endoscopy center the day of your procedure. The colonoscopy takes an average of 15 to 30 minutes. Recovery time is about 30 minutes.    Before the exam:    You will change into a gown.    Your medical history and medication list will be reviewed with you, unless that has been done over the phone prior to the procedure.     A nurse will insert an intravenous (IV) line into your hand or arm.    The doctor will meet with you and will give you a consent form to sign.    During the exam:     Medicine will be given through the IV line to help you relax.     Your heart rate and oxygen levels will be monitored. If your blood pressure is low, you may be given fluids through the IV line.     The doctor will insert a flexible hollow tube, called a colonoscope, into your rectum. The scope will be advanced slowly through the large intestine (colon).    You may have a feeling of fullness or pressure.     If an abnormal tissue or a polyp is found, the doctor may remove it through the endoscope for closer examination, or biopsy. Tissue removal is painless    After the exam:           Any tissue samples removed during the exam will be sent to a lab for evaluation. It may take 5-7 working days for you to be notified of the results.     A nurse will provide you with complete discharge instructions before you leave the endoscopy center. Be sure to ask the nurse for specific instructions if you take blood thinners such as Aspirin, Coumadin or Plavix.     The doctor will prepare a full  report for you and for the physician who referred you for the procedure.     Your doctor will talk with you about the initial results of your exam.      Medication given during the exam will prohibit you from driving for the rest of the day.     Following the exam, you may resume your normal diet. Your first meal should be light, no greasy foods. Avoid alcohol until the next day.     You may resume your regular activities the day after the procedure.     LOW-FIBER DIET    Foods RECOMMENDED Foods to AVOID   Breads, Cereal, Rice and Pasta:   White bread, rolls, biscuits, croissant and gabriela toast.   Waffles, Cymraes toast and pancakes.   White rice, noodles, pasta, macaroni and peeled cooked potatoes.   Plain crackers and saltines.   Cooked cereals: farina, cream of rice.   Cold cereals: Puffed Rice , Rice Krispies , Corn Flakes  and Special K    Breads, Cereal, Rice and Pasta:   Breads or rolls with nuts, seeds or fruit.   Whole wheat, pumpernickel, rye breads and cornbread.   Potatoes with skin, brown or wild rice, and kasha (buckwheat).     Vegetables:   Tender cooked and canned vegetables without seeds: carrots, asparagus tips, green or wax beans, pumpkin, spinach, lima beans. Vegetables:   Raw or steamed vegetables.   Vegetables with seeds.   Sauerkraut.   Winter squash, peas, broccoli, Brussel sprouts, cabbage, onions, cauliflower, baked beans, peas and corn.   Fruits:   Strained fruit juice.   Canned fruit, except pineapple.   Ripe bananas and melon. Fruits:   Prunes and prune juice.   Raw fruits.   Dried fruits: figs, dates and raisins.   Milk/Dairy:   Milk: plain or flavored.   Yogurt, custard and ice cream.   Cheese and cottage cheese Milk/Dairy:     Meat and other proteins:   ground, well-cooked tender beef, lamb, ham, veal, pork, fish, poultry and organ meats.   Eggs.   Peanut butter without nuts. Meat and other proteins:   Tough, fibrous meats with gristle.   Dry beans, peas and lentils.   Peanut butter  with nuts.   Tofu.   Fats, Snack, Sweets, Condiments and Beverages:   Margarine, butter, oils, mayonnaise, sour cream and salad dressing, plain gravy.   Sugar, hard candy, clear jelly, honey and syrup.   Spices, cooked herbs, bouillon, broth and soups made with allowed vegetable, ketchup and mustard.   Coffee, tea and carbonated drinks.   Plain cakes, cookies and pretzels.   Gelatin, plain puddings, custard, ice cream, sherbet and popsicles. Fats, Snack, Sweets, Condiments and Beverages:   Nuts, seeds and coconut.   Jam, marmalade and preserves.   Pickles, olives, relish and horseradish.   All desserts containing nuts, seeds, dried fruit and coconut; or made from whole grains or bran.   Candy made with nuts or seeds.   Popcorn.                     DIRECTIONS TO THE ENDOSCOPY DEPARTMENT     From the north ([facility])  Take [address] South, exit on [address] [address]. Get into the left hand ania, turn left (east), go one-half mile to [address] and turn left. Go north to the first stoplight, take a right on [address] and follow it to the Emergency entrance.    From the south ([facility])  Take 35N to the 35E split and exit on [address] [address]. On [address] [address], turn left (west) to [address]. Turn right (north) on [address]. Go north to the first stoplight, take a right on [address] and follow it to the Emergency entrance.    From the east via 35E ([facility])  Take [address]E south to [address] [address] exit. Turn right on [address] [address]. Go west to [address]. Turn right (north) on [address]. Go to the first stoplight, take a right and follow on [address] to the Emergency entrance.    From the east via [address] ([facility])  Take [address] to [address]. Turn left (south) on [address] to [address]. Turn left (east) on [address] and follow it to the Emergency entrance.    From the west via [address] 13 ([place])  Take [address] east to [address]. Turn right (south) on [address] to [place]. Turn left (east) on [address] and follow it to the Emergency entrance.

## 2018-03-22 NOTE — H&P
Pre-Endoscopy History and Physical     Yogi Moreland MRN# [id number]   YOB: 1960 Age: 57 year old     Date of Procedure: 3/22/2018  Primary care provider: Randolph Mueller  Type of Endoscopy: Colonoscopy with possible biopsy, possible polypectomy  Reason for Procedure: screen  Type of Anesthesia Anticipated: Conscious Sedation    HPI:    Yogi is a 57 year old male who will be undergoing the above procedure.      A history and physical has been performed. The patient's medications and allergies have been reviewed. The risks and benefits of the procedure and the sedation options and risks were discussed with the patient.  All questions were answered and informed consent was obtained.      He denies a personal or family history of anesthesia complications or bleeding disorders.     Patient Active Problem List   Diagnosis     History of colon polyps     Hyperlipidemia LDL goal <160     Impaired fasting glucose     Elevated PSA     Elevated LFTs     Diverticulitis of colon with perforation     S/P colostomy takedown     Prostate cancer (H)        Past Medical History:   Diagnosis Date     Colon polyp      Colonic diverticular abscess      Elevated LFTs 8/2016     Elevated PSA      Enlarged prostate      Hyperlipidemia      Prostate cancer (H)         Past Surgical History:   Procedure Laterality Date     APPENDECTOMY OPEN N/A 9/26/2016    Procedure: APPENDECTOMY OPEN;  Surgeon: Ramón Masters MD;  Location: [facility] OR     COLECTOMY WITH COLOSTOMY, COMBINED N/A 9/26/2016    Procedure: COMBINED COLECTOMY WITH COLOSTOMY;  Surgeon: Ramón Masters MD;  Location: [facility] OR     COLONOSCOPY  2009     COLONOSCOPY  2/2013     HERNIORRHAPHY INGUINAL Left 12/15/2017    Procedure: HERNIORRHAPHY INGUINAL;  Left Inguinal Herniorrhaphy with Mesh;  Surgeon: Ramón Masters MD;  Location: [facility] OR     LAPAROSCOPIC ASSISTED COLOSTOMY TAKEDOWN N/A 12/27/2016    Procedure: LAPAROSCOPIC ASSISTED COLOSTOMY TAKEDOWN;   "Surgeon: Ramón Masters MD;  Location: [facility] OR     LAPAROTOMY EXPLORATORY N/A 9/26/2016    Procedure: LAPAROTOMY EXPLORATORY;  Surgeon: Ramón Masters MD;  Location: [facility] OR     PROSTATECTOMY RETROPUBIC RADICAL N/A 7/11/2017    Procedure: PROSTATECTOMY RETROPUBIC RADICAL;  Retropubic Radical Prostectectomy, Bilateral pelvic Lymph Node Dissection ;  Surgeon: Chuy Reddy MD;  Location: [facility] OR       Social History   Substance Use Topics     Smoking status: Never Smoker     Smokeless tobacco: Never Used     Alcohol use 0.0 oz/week     0 Standard drinks or equivalent per week      Comment: 7-10 week       Family History   Problem Relation Age of Onset     CANCER Mother      metastatic     CANCER Father      melanoma      Colon Cancer No family hx of        Prior to Admission medications    Medication Sig Start Date End Date Taking? Authorizing Provider   Ibuprofen (ADVIL PO) Take 400 mg by mouth every 6 hours as needed for moderate pain   Yes Reported, Patient   oxyCODONE IR (ROXICODONE) 5 MG tablet Take 1-2 tablets (5-10 mg) by mouth every 3 hours as needed for pain or other (Moderate to Severe)  Patient not taking: Reported on 12/26/2017 12/15/17   Ramón Masters MD   Tadalafil (CIALIS PO)     Reported, Patient   tadalafil (CIALIS) 5 MG tablet Take 1 tablet (5 mg) by mouth daily as needed Do not use with nitroglycerin, terazosin or doxazosin. 12/13/17   Chuy Reddy MD   tadalafil (CIALIS) 5 MG tablet Take 1 tablet (5 mg) by mouth daily Never use with nitroglycerin, terazosin or doxazosin. 8/30/17   Chuy Reddy MD       No Known Allergies     REVIEW OF SYSTEMS:   5 point ROS negative except as noted above in HPI, including Gen., Resp., CV, GI &  system review.    PHYSICAL EXAM:   /75 Estimated body mass index is 23.43 kg/(m^2) as calculated from the following:    Height as of 2/20/18: 1.803 m (5' 11\").    Weight as of 2/20/18: 76.2 kg (168 lb).   GENERAL APPEARANCE: alert, and " oriented  MENTAL STATUS: alert  AIRWAY EXAM: Mallampatti Class I (visualization of the soft palate, fauces, uvula, anterior and posterior pillars)  RESP: lungs clear to auscultation - no rales, rhonchi or wheezes  CV: regular rates and rhythm  DIAGNOSTICS:    Not indicated    IMPRESSION   ASA Class 2 - Mild systemic disease    PLAN:   Plan for Colonoscopy with possible biopsy, possible polypectomy. We discussed the risks, benefits and alternatives and the patient wished to proceed.    The above has been forwarded to the consulting provider.      Signed Electronically by: Chris Barrios  March 22, 2018

## 2018-05-17 DIAGNOSIS — C61 MALIGNANT NEOPLASM OF PROSTATE (H): ICD-10-CM

## 2018-05-17 PROCEDURE — 84153 ASSAY OF PSA TOTAL: CPT | Performed by: FAMILY MEDICINE

## 2018-05-17 PROCEDURE — 36415 COLL VENOUS BLD VENIPUNCTURE: CPT | Performed by: FAMILY MEDICINE

## 2018-05-18 LAB — PSA SERPL-MCNC: <0.01 UG/L (ref 0–4)

## 2018-05-24 ENCOUNTER — OFFICE VISIT (OUTPATIENT)
Dept: UROLOGY | Facility: CLINIC | Age: 58
End: 2018-05-24
Payer: COMMERCIAL

## 2018-05-24 VITALS — HEIGHT: 71 IN | OXYGEN SATURATION: 96 % | WEIGHT: 168 LBS | BODY MASS INDEX: 23.52 KG/M2 | HEART RATE: 70 BPM

## 2018-05-24 DIAGNOSIS — C61 MALIGNANT NEOPLASM OF PROSTATE (H): Primary | ICD-10-CM

## 2018-05-24 PROCEDURE — 99213 OFFICE O/P EST LOW 20 MIN: CPT | Performed by: UROLOGY

## 2018-05-24 ASSESSMENT — PAIN SCALES - GENERAL: PAINLEVEL: NO PAIN (0)

## 2018-05-24 NOTE — NURSING NOTE
Pt denies any voiding trouble.  No UA at this time.  Pt just here for PSA results.  SMITA Quiñones, CMA

## 2018-05-24 NOTE — MR AVS SNAPSHOT
After Visit Summary   5/24/2018    Yogi Moreland    MRN: [id number]           Patient Information     Date Of Birth          1960        Visit Information        Provider Department      5/24/2018 9:50 AM Chuy Reddy MD [facility] [place]        Today's Diagnoses     Malignant neoplasm of prostate (H)    -  1       Follow-ups after your visit        Future tests that were ordered for you today     Open Future Orders        Priority Expected Expires Ordered    PSA tumor marker Routine 8/24/2018 9/14/2018 5/24/2018            Who to contact     If you have questions or need follow up information about today's clinic visit or your schedule please contact [facility] [place] directly at [phone].  Normal or non-critical lab and imaging results will be communicated to you by Drobohart, letter or phone within 4 business days after the clinic has received the results. If you do not hear from us within 7 days, please contact the clinic through Drobohart or phone. If you have a critical or abnormal lab result, we will notify you by phone as soon as possible.  Submit refill requests through [employer] or call your pharmacy and they will forward the refill request to us. Please allow 3 business days for your refill to be completed.          Additional Information About Your Visit        MyChart Information     [employer] gives you secure access to your electronic health record. If you see a primary care provider, you can also send messages to your care team and make appointments. If you have questions, please call your primary care clinic.  If you do not have a primary care provider, please call [phone] and they will assist you.        Care EveryWhere ID     This is your Care EveryWhere ID. This could be used by other organizations to access your [facility] medical records  [id number]        Your Vitals Were     Pulse Height  "Pulse Oximetry BMI (Body Mass Index)          70 1.803 m (5' 11\") 96% 23.43 kg/m2         Blood Pressure from Last 3 Encounters:   03/22/18 112/76   12/26/17 142/60   12/15/17 137/81    Weight from Last 3 Encounters:   05/24/18 76.2 kg (168 lb)   02/20/18 76.2 kg (168 lb)   12/26/17 76.2 kg (168 lb)               Primary Care Provider Office Phone # Fax #    Randolph Mueller [phone] [address] [address] [facility] [address]        Equal Access to Services     [facility] : Hadii mauricio hartman hadderiano Sojohnathan, waaxda luqadaha, qaybta kaalmada philyada, romain shipman. So [facility] [phone].    ATENCIÓN: Si habla español, tiene a solis disposición servicios gratuitos de asistencia lingüística. Llame al [phone].    We comply with applicable federal civil rights laws and [place] laws. We do not discriminate on the basis of race, color, national origin, age, disability, sex, sexual orientation, or gender identity.            Thank you!     Thank you for choosing [facility] [place]  for your care. Our goal is always to provide you with excellent care. Hearing back from our patients is one way we can continue to improve our services. Please take a few minutes to complete the written survey that you may receive in the mail after your visit with us. Thank you!             Your Updated Medication List - Protect others around you: Learn how to safely use, store and throw away your medicines at www.disposemymeds.org.          This list is accurate as of 5/24/18 10:20 AM.  Always use your most recent med list.                   Brand Name Dispense Instructions for use Diagnosis    ADVIL PO      Take 400 mg by mouth every 6 hours as needed for moderate pain        oxyCODONE IR 5 MG tablet    ROXICODONE    30 tablet    Take 1-2 tablets (5-10 mg) by mouth every 3 hours as needed for pain or other (Moderate to Severe)    Left inguinal " hernia       * CIALIS PO           * tadalafil 5 MG tablet    CIALIS    30 tablet    Take 1 tablet (5 mg) by mouth daily Never use with nitroglycerin, terazosin or doxazosin.    Prostate cancer (H)       * tadalafil 5 MG tablet    CIALIS    30 tablet    Take 1 tablet (5 mg) by mouth daily as needed Do not use with nitroglycerin, terazosin or doxazosin.    ED (erectile dysfunction)       * Notice:  This list has 3 medication(s) that are the same as other medications prescribed for you. Read the directions carefully, and ask your doctor or other care provider to review them with you.

## 2018-05-24 NOTE — LETTER
5/24/2018       RE: Yogi Moreland  [address] [facility] [address]     Dear Colleague,    Thank you for referring your patient, Yogi Moreland, to the [facility] [place] at [facility]. Please see a copy of my visit note below.    Yogi Moreland is a 57-year-old male who underwent radical retropubic prostatectomy with bilateral pelvic lymph node dissection 10 months ago. His final path revealed grade 3+4 adenocarcinoma with negative surgical margins and negative lymph nodes.  The patient is voiding with excellent urinary control. He takes Cialis 5 mg twice weekly and is having no erections  Other past medical history: Colectomy for diverticular abscess and diverticulitis, hyperlipidemia, colonic polyps, nonsmoker  Family history: Melanoma  Medications: Ibuprofen, Cialis  Allergies: None  Exam: Alert and oriented, normocephalic, normal respirations, neuro grossly intact  PSA: Undetectable  Assessment: History of grade 3+4 adenocarcinoma the prostate, erectile dysfunction-continue Cialis twice weekly and expect some recovery of erectile dysfunction at 12-18 months postop  Plan: See me yearly, PSA every 3 months. After 1 more year, PSA every 6 months     Again, thank you for allowing me to participate in the care of your patient.      Sincerely,    Chuy Reddy MD

## 2018-05-24 NOTE — PROGRESS NOTES
Yoig Moreland is a 57-year-old male who underwent radical retropubic prostatectomy with bilateral pelvic lymph node dissection 10 months ago. His final path revealed grade 3+4 adenocarcinoma with negative surgical margins and negative lymph nodes.  The patient is voiding with excellent urinary control. He takes Cialis 5 mg twice weekly and is having no erections  Other past medical history: Colectomy for diverticular abscess and diverticulitis, hyperlipidemia, colonic polyps, nonsmoker  Family history: Melanoma  Medications: Ibuprofen, Cialis  Allergies: None  Exam: Alert and oriented, normocephalic, normal respirations, neuro grossly intact  PSA: Undetectable  Assessment: History of grade 3+4 adenocarcinoma the prostate, erectile dysfunction-continue Cialis twice weekly and expect some recovery of erectile dysfunction at 12-18 months postop  Plan: See me yearly, PSA every 3 months. After 1 more year, PSA every 6 months

## 2018-10-25 DIAGNOSIS — C61 MALIGNANT NEOPLASM OF PROSTATE (H): ICD-10-CM

## 2018-10-25 PROCEDURE — 36415 COLL VENOUS BLD VENIPUNCTURE: CPT | Performed by: UROLOGY

## 2018-10-25 PROCEDURE — 84153 ASSAY OF PSA TOTAL: CPT | Performed by: UROLOGY

## 2018-10-26 LAB — PSA SERPL-MCNC: 0.01 UG/L (ref 0–4)

## 2018-10-29 ENCOUNTER — TELEPHONE (OUTPATIENT)
Dept: UROLOGY | Facility: CLINIC | Age: 58
End: 2018-10-29

## 2018-11-02 ENCOUNTER — TELEPHONE (OUTPATIENT)
Dept: UROLOGY | Facility: CLINIC | Age: 58
End: 2018-11-02

## 2018-11-02 NOTE — TELEPHONE ENCOUNTER
Urology pt of Dr. Reddy last seen 5/24/2018. Received faxed request for generic Cialis. Rx signed and faxed back to pharmacy. Original sent to scan.

## 2018-11-07 ENCOUNTER — TELEPHONE (OUTPATIENT)
Dept: UROLOGY | Facility: CLINIC | Age: 58
End: 2018-11-07

## 2018-11-07 DIAGNOSIS — Z85.46 PERSONAL HISTORY OF MALIGNANT NEOPLASM OF PROSTATE: ICD-10-CM

## 2018-11-07 DIAGNOSIS — R97.20 ELEVATED PROSTATE SPECIFIC ANTIGEN (PSA): Primary | ICD-10-CM

## 2018-11-07 NOTE — TELEPHONE ENCOUNTER
Called pt with the results and direction as below. He expressed understanding. Orders placed.     Notes Recorded by Chuy Reddy MD on 11/6/2018 at 3:57 PM  OK to call.   Needs PSA at [place] office next 1-2 weeks           Ref Range & Units 13d ago   5mo ago   8mo ago        PSA 0 - 4 ug/L 0.01 <0.01CM <0.01CM     Comments: Assay Method:  Chemiluminescence using Siemens Vista analyzer     [place]  [facility] [place]

## 2018-11-08 NOTE — ANESTHESIA POSTPROCEDURE EVALUATION
Individual received his midodrine this morning with 8 oz of water for BP 82/53  P124  Patient: Yogi Moreland    Procedure(s):  Left Inguinal hernia repair with Mesh  - Wound Class: I-Clean    Diagnosis:Inguinal hernia   Diagnosis Additional Information: BARBIE Masters    Anesthesia Type:  General, LMA    Note:  Anesthesia Post Evaluation    Patient location during evaluation: PACU  Patient participation: Able to fully participate in evaluation  Level of consciousness: awake  Pain management: adequate  Airway patency: patent  Cardiovascular status: acceptable  Respiratory status: acceptable  Hydration status: acceptable  PONV: none             Last vitals:  Vitals:    12/15/17 1445 12/15/17 1500 12/15/17 1510   BP: 123/85  122/79   Pulse:      Resp: 12 13 12   Temp:  97.3  F (36.3  C)    SpO2: 100% 97% 99%         Electronically Signed By: Jayson Saravia MD  December 15, 2017  3:41 PM

## 2018-11-28 ENCOUNTER — TELEPHONE (OUTPATIENT)
Dept: UROLOGY | Facility: CLINIC | Age: 58
End: 2018-11-28

## 2018-11-28 NOTE — TELEPHONE ENCOUNTER
Patient called nurse line and LM. Returned patient's phone call and LM. Will wait for a return phone call.     Angie Ham LPN

## 2018-11-29 DIAGNOSIS — R97.20 ELEVATED PROSTATE SPECIFIC ANTIGEN (PSA): ICD-10-CM

## 2018-11-29 DIAGNOSIS — Z85.46 PERSONAL HISTORY OF MALIGNANT NEOPLASM OF PROSTATE: ICD-10-CM

## 2018-11-29 LAB — PSA SERPL-MCNC: <0.04 NG/ML (ref 0–4)

## 2018-11-29 PROCEDURE — 36415 COLL VENOUS BLD VENIPUNCTURE: CPT | Performed by: UROLOGY

## 2018-11-29 PROCEDURE — 84153 ASSAY OF PSA TOTAL: CPT | Performed by: UROLOGY

## 2019-02-08 DIAGNOSIS — C61 PROSTATE CANCER (H): Primary | ICD-10-CM

## 2019-02-12 ENCOUNTER — MYC MEDICAL ADVICE (OUTPATIENT)
Dept: FAMILY MEDICINE | Facility: CLINIC | Age: 59
End: 2019-02-12

## 2019-02-12 ENCOUNTER — OFFICE VISIT (OUTPATIENT)
Dept: FAMILY MEDICINE | Facility: CLINIC | Age: 59
End: 2019-02-12
Payer: COMMERCIAL

## 2019-02-12 VITALS
DIASTOLIC BLOOD PRESSURE: 60 MMHG | BODY MASS INDEX: 24.92 KG/M2 | TEMPERATURE: 98.1 F | OXYGEN SATURATION: 98 % | HEIGHT: 71 IN | RESPIRATION RATE: 22 BRPM | WEIGHT: 178 LBS | HEART RATE: 78 BPM | SYSTOLIC BLOOD PRESSURE: 130 MMHG

## 2019-02-12 DIAGNOSIS — Z23 NEED FOR VACCINATION: ICD-10-CM

## 2019-02-12 DIAGNOSIS — B02.9 HERPES ZOSTER WITHOUT COMPLICATION: Primary | ICD-10-CM

## 2019-02-12 DIAGNOSIS — L72.3 SEBACEOUS CYST: ICD-10-CM

## 2019-02-12 DIAGNOSIS — G62.9 NEUROPATHY: ICD-10-CM

## 2019-02-12 DIAGNOSIS — L30.9 DERMATITIS: ICD-10-CM

## 2019-02-12 PROCEDURE — 90715 TDAP VACCINE 7 YRS/> IM: CPT | Performed by: FAMILY MEDICINE

## 2019-02-12 PROCEDURE — 99214 OFFICE O/P EST MOD 30 MIN: CPT | Mod: 25 | Performed by: FAMILY MEDICINE

## 2019-02-12 PROCEDURE — 90471 IMMUNIZATION ADMIN: CPT | Performed by: FAMILY MEDICINE

## 2019-02-12 RX ORDER — LIDOCAINE 50 MG/G
1 PATCH TOPICAL EVERY 24 HOURS
Qty: 30 PATCH | Refills: 3 | Status: SHIPPED | OUTPATIENT
Start: 2019-02-12 | End: 2020-02-18

## 2019-02-12 ASSESSMENT — MIFFLIN-ST. JEOR: SCORE: 1639.53

## 2019-02-12 NOTE — PROGRESS NOTES
"  SUBJECTIVE:   Yogi Moreland is a 58 year old male who presents to clinic today for the following health issues:    Concern - Bug bite on abdomen  Onset: 1 week    Description:   4 bites on abdomen with some pain around surrounding area.     Intensity: mild, moderate    Progression of Symptoms:  worsening    Accompanying Signs & Symptoms:  None    Previous history of similar problem:   None    Precipitating factors:   Worsened by: motion in right area, and touch    Alleviating factors:  Improved by: ice, OTC's    Therapies Tried and outcome: ice, OTC's.   Patient reports that he did not feel the bite happen, occurred 1 week ago. He notes that the \"bug bites on his stomach are sensitive in the morning and annoying.\" He feels them on his rib cage in his \"muscle tissue\" upon exertion and stretching..      Neuropathyhypoesthetic painlesss feet, blamed on tick borne infection.  Dermatitis on face R cheek > L. He blames \"wind burn\"  Sebaceous cyst no symptoms, occasionally needled midline thoracic spine  Need for vaccination discussed    Problem list and histories reviewed & adjusted, as indicated.  Additional history: as documented    PAST MEDICAL HISTORY:   Past Medical History:   Diagnosis Date     Colon polyp      Colonic diverticular abscess      Elevated LFTs 8/2016     Elevated PSA      Enlarged prostate      Hyperlipidemia      Prostate cancer (H)        PAST SURGICAL HISTORY:   Past Surgical History:   Procedure Laterality Date     APPENDECTOMY OPEN N/A 9/26/2016    Procedure: APPENDECTOMY OPEN;  Surgeon: Ramón Masters MD;  Location:  OR     COLECTOMY WITH COLOSTOMY, COMBINED N/A 9/26/2016    Procedure: COMBINED COLECTOMY WITH COLOSTOMY;  Surgeon: Ramón Masters MD;  Location:  OR     COLONOSCOPY  2009     COLONOSCOPY  2/2013     COLONOSCOPY N/A 3/22/2018    Procedure: COLONOSCOPY;  COLONOSCOPY;  Surgeon: Chris Barrios MD;  Location:  GI     HERNIORRHAPHY INGUINAL Left 12/15/2017    " "Procedure: HERNIORRHAPHY INGUINAL;  Left Inguinal Herniorrhaphy with Mesh;  Surgeon: Ramón Masters MD;  Location: [facility] OR     LAPAROSCOPIC ASSISTED COLOSTOMY TAKEDOWN N/A 12/27/2016    Procedure: LAPAROSCOPIC ASSISTED COLOSTOMY TAKEDOWN;  Surgeon: Ramón Masters MD;  Location: [facility] OR     LAPAROTOMY EXPLORATORY N/A 9/26/2016    Procedure: LAPAROTOMY EXPLORATORY;  Surgeon: Ramón Masters MD;  Location: [facility] OR     PROSTATECTOMY RETROPUBIC RADICAL N/A 7/11/2017    Procedure: PROSTATECTOMY RETROPUBIC RADICAL;  Retropubic Radical Prostectectomy, Bilateral pelvic Lymph Node Dissection ;  Surgeon: Chuy Reddy MD;  Location: [facility] OR       FAMILY HISTORY:   Family History   Problem Relation Age of Onset     Cancer Mother         metastatic     Cancer Father         melanoma      Colon Cancer No family hx of        SOCIAL HISTORY:   Social History     Tobacco Use     Smoking status: Never Smoker     Smokeless tobacco: Never Used   Substance Use Topics     Alcohol use: Yes     Alcohol/week: 0.0 oz     Comment: 7-10 week         Reviewed and updated as needed this visit by clinical staff       Reviewed and updated as needed this visit by Provider         ROS:  CONSTITUTIONAL:NEGATIVE for fever, chills, change in weight, itxh      OBJECTIVE:     /60 (BP Location: Right arm, Patient Position: Chair, Cuff Size: Adult Large)   Pulse 78   Temp 98.1  F (36.7  C) (Oral)   Resp 22   Ht 1.803 m (5' 11\")   Wt 80.7 kg (178 lb)   SpO2 98%   BMI 24.83 kg/m    Body mass index is 24.83 kg/m .    SKIN: L nasolabial fold with erythrma, no vesicles, extends toward nasal bridge  R abd wall with 5 erythematous papules with eschared center  approx T4 midline non inflamed sebaceous cyst with oxidized comedone    Diagnostic Test Results:  No results found for this or any previous visit (from the past 24 hour(s)).    ASSESSMENT/PLAN:     (B02.9) Herpes zoster without complication  (primary encounter diagnosis)  Comment: " postulated,, insect bites less likely. No other dermatomal lesions  Plan: lidocaine (LIDODERM) 5 % patch          (G62.9) Neuropathy  Comment: discussed Previous w/u unrevealing  Plan: HIV Antigen Antibody Combo, Folate, Treponema         Abs w Reflex to RPR and Titer, Vitamin B12 broaden data base            (L30.9) Dermatitis  Comment: he is both concerned about spreading abd rash and dismissive of possibilty. Discussed. Observe evolution    (L72.3) Sebaceous cyst  Comment: excision vs observation discussed: he prefers the latter      The information in this document, created by the medical scribe for me, accurately reflects the services I personally performed and the decisions made by me. I have reviewed and approved this document for accuracy prior to leaving the patient care area.  February 12, 2019 1:12 PM      Kiel Torres MD  [facility]

## 2019-02-13 ENCOUNTER — MYC MEDICAL ADVICE (OUTPATIENT)
Dept: FAMILY MEDICINE | Facility: CLINIC | Age: 59
End: 2019-02-13

## 2019-02-13 NOTE — TELEPHONE ENCOUNTER
Discussed with Dr. Deleon since Dr. Torres off today and sending 2nd message.  [employer] response sent.  Irina Gaines RN

## 2019-02-13 NOTE — TELEPHONE ENCOUNTER
See [employer] message below.  Sent to Dr. Torres but now sending 2nd message.  Sent to provider.  Please advise.  Irina Gaines RN

## 2019-02-13 NOTE — TELEPHONE ENCOUNTER
Dr. Torres- see [employer] message below.  Please advise.  Irina Gaines, RN      2/12/19  ASSESSMENT/PLAN:      (B02.9) Herpes zoster without complication  (primary encounter diagnosis)  Comment: postulated,, insect bites less likely. No other dermatomal lesions  Plan: lidocaine (LIDODERM) 5 % patch          (G62.9) Neuropathy  Comment: discussed Previous w/u unrevealing  Plan: HIV Antigen Antibody Combo, Folate, Treponema         Abs w Reflex to RPR and Titer, Vitamin B12 broaden data base             (L30.9) Dermatitis  Comment: he is both concerned about spreading abd rash and dismissive of possibilty. Discussed. Observe evolution     (L72.3) Sebaceous cyst  Comment: excision vs observation discussed: he prefers the latter        The information in this document, created by the medical scribe for me, accurately reflects the services I personally performed and the decisions made by me. I have reviewed and approved this document for accuracy prior to leaving the patient care area.  February 12, 2019 1:12 PM        [place]iel [place] MD  [facility] [place]

## 2019-02-14 ENCOUNTER — MYC MEDICAL ADVICE (OUTPATIENT)
Dept: FAMILY MEDICINE | Facility: CLINIC | Age: 59
End: 2019-02-14

## 2019-02-14 DIAGNOSIS — B02.9 HERPES ZOSTER WITHOUT COMPLICATION: Primary | ICD-10-CM

## 2019-02-15 RX ORDER — VALACYCLOVIR HYDROCHLORIDE 1 G/1
1000 TABLET, FILM COATED ORAL 3 TIMES DAILY
Qty: 21 TABLET | Refills: 0 | Status: SHIPPED | OUTPATIENT
Start: 2019-02-15 | End: 2019-03-05

## 2019-02-15 NOTE — TELEPHONE ENCOUNTER
Dr. Torres- see "[employer]hart message below.  Please advise.  Irina Gaines, RN      2/12/19  ASSESSMENT/PLAN:      (B02.9) Herpes zoster without complication  (primary encounter diagnosis)  Comment: postulated,, insect bites less likely. No other dermatomal lesions  Plan: lidocaine (LIDODERM) 5 % patch          (G62.9) Neuropathy  Comment: discussed Previous w/u unrevealing  Plan: HIV Antigen Antibody Combo, Folate, Treponema         Abs w Reflex to RPR and Titer, Vitamin B12 broaden data base             (L30.9) Dermatitis  Comment: he is both concerned about spreading abd rash and dismissive of possibilty. Discussed. Observe evolution     (L72.3) Sebaceous cyst  Comment: excision vs observation discussed: he prefers the latter        The information in this document, created by the medical scribe for me, accurately reflects the services I personally performed and the decisions made by me. I have reviewed and approved this document for accuracy prior to leaving the patient care area.  February 12, 2019 1:12 PM        Kiel Torres MD  [facility]         Instructions         Return in about 1 year (around 2/12/2020).

## 2019-03-04 DIAGNOSIS — C61 PROSTATE CANCER (H): ICD-10-CM

## 2019-03-04 DIAGNOSIS — G62.9 NEUROPATHY: ICD-10-CM

## 2019-03-04 PROCEDURE — 36415 COLL VENOUS BLD VENIPUNCTURE: CPT | Performed by: UROLOGY

## 2019-03-04 PROCEDURE — 0064U ANTB TP TOTAL&RPR IA QUAL: CPT | Performed by: UROLOGY

## 2019-03-04 PROCEDURE — 87389 HIV-1 AG W/HIV-1&-2 AB AG IA: CPT | Performed by: UROLOGY

## 2019-03-04 PROCEDURE — 82607 VITAMIN B-12: CPT | Performed by: UROLOGY

## 2019-03-04 PROCEDURE — 84153 ASSAY OF PSA TOTAL: CPT | Performed by: UROLOGY

## 2019-03-04 PROCEDURE — 82746 ASSAY OF FOLIC ACID SERUM: CPT | Performed by: FAMILY MEDICINE

## 2019-03-05 ENCOUNTER — OFFICE VISIT (OUTPATIENT)
Dept: FAMILY MEDICINE | Facility: CLINIC | Age: 59
End: 2019-03-05
Payer: COMMERCIAL

## 2019-03-05 VITALS
HEIGHT: 71 IN | DIASTOLIC BLOOD PRESSURE: 75 MMHG | BODY MASS INDEX: 24.92 KG/M2 | RESPIRATION RATE: 14 BRPM | WEIGHT: 178 LBS | TEMPERATURE: 98.3 F | HEART RATE: 91 BPM | SYSTOLIC BLOOD PRESSURE: 123 MMHG

## 2019-03-05 DIAGNOSIS — G62.9 NEUROPATHY: ICD-10-CM

## 2019-03-05 DIAGNOSIS — B02.9 HERPES ZOSTER WITHOUT COMPLICATION: Primary | ICD-10-CM

## 2019-03-05 LAB
FOLATE SERPL-MCNC: 19.1 NG/ML
HIV 1+2 AB+HIV1 P24 AG SERPL QL IA: NONREACTIVE
PSA SERPL-MCNC: 0.02 UG/L (ref 0–4)
VIT B12 SERPL-MCNC: 318 PG/ML (ref 193–986)

## 2019-03-05 PROCEDURE — 99213 OFFICE O/P EST LOW 20 MIN: CPT | Performed by: FAMILY MEDICINE

## 2019-03-05 ASSESSMENT — MIFFLIN-ST. JEOR: SCORE: 1639.53

## 2019-03-05 NOTE — PROGRESS NOTES
SUBJECTIVE:   Yogi Moreland is a 58 year old male who presents to clinic today for the following health issues:      Follow up on Shingles - Patient reports that his shingles have been subtle for the past 4 - 5 days, irritated upon exertion. He notices some movement. Wonders if he is still contagious.  He was diagnosed.  His rash did not spread further.  Although he had a brief episode of discomfort in the mid axillary line he developed no rash his anterior rash is drying up his pain is decreasing    Blood Tests - Patient wonders about blood tests and the use of B12 for neuropathy.          Problem list and histories reviewed & adjusted, as indicated.  Additional history: as documented    PAST MEDICAL HISTORY:   Past Medical History:   Diagnosis Date     Colon polyp      Colonic diverticular abscess      Dermatitis 2/12/2019     Elevated LFTs 8/2016     Elevated PSA      Enlarged prostate      Hyperlipidemia      Neuropathy 2/12/2019     Prostate cancer (H)      S/P colostomy takedown 12/27/2016     Sebaceous cyst 2/12/2019       PAST SURGICAL HISTORY:   Past Surgical History:   Procedure Laterality Date     APPENDECTOMY OPEN N/A 9/26/2016    Procedure: APPENDECTOMY OPEN;  Surgeon: Ramón Masters MD;  [facility]: [facility] OR     COLECTOMY WITH COLOSTOMY, COMBINED N/A 9/26/2016    Procedure: COMBINED COLECTOMY WITH COLOSTOMY;  Surgeon: Ramón Masters MD;  Location: [facility] OR     COLONOSCOPY  2009     COLONOSCOPY  2/2013     COLONOSCOPY N/A 3/22/2018    Procedure: COLONOSCOPY;  COLONOSCOPY;  Surgeon: Chris Barrios MD;  Location: [facility] GI     HERNIORRHAPHY INGUINAL Left 12/15/2017    Procedure: HERNIORRHAPHY INGUINAL;  Left Inguinal Herniorrhaphy with Mesh;  Surgeon: Ramón Masters MD;  Location: [facility] OR     LAPAROSCOPIC ASSISTED COLOSTOMY TAKEDOWN N/A 12/27/2016    Procedure: LAPAROSCOPIC ASSISTED COLOSTOMY TAKEDOWN;  Surgeon: Ramón Masters MD;  Location: [facility] OR     LAPAROTOMY EXPLORATORY N/A 9/26/2016     "Procedure: LAPAROTOMY EXPLORATORY;  Surgeon: Ramón Masters MD;  Location: [facility] OR     PROSTATECTOMY RETROPUBIC RADICAL N/A 7/11/2017    Procedure: PROSTATECTOMY RETROPUBIC RADICAL;  Retropubic Radical Prostectectomy, Bilateral pelvic Lymph Node Dissection ;  Surgeon: Chuy Reddy MD;  Location: [facility] OR       FAMILY HISTORY:   Family History   Problem Relation Age of Onset     Cancer Mother         metastatic     Cancer Father         melanoma      Colon Cancer No family hx of        SOCIAL HISTORY:   Social History     Tobacco Use     Smoking status: Never Smoker     Smokeless tobacco: Never Used   Substance Use Topics     Alcohol use: Yes     Alcohol/week: 0.0 oz     Comment: 7-10 week         Reviewed and updated as needed this visit by clinical staff  Tobacco  Allergies  Meds  Med Hx  Surg Hx  Fam Hx  Soc Hx      Reviewed and updated as needed this visit by Provider         ROS:     No fevers no GI no pulmonary symptoms    This document serves as a record of the services and decisions personally performed and made by Kiel Torres MD. It was created on his behalf by Heriberto Regan, a trained medical scribe. The creation of this document is based on the provider's statements to the medical scribe.  Heriberto Regan March 5, 2019 9:54 AM      OBJECTIVE:     /75 (BP Location: Right arm, Patient Position: Chair, Cuff Size: Adult Large)   Pulse 91   Temp 98.3  F (36.8  C) (Oral)   Resp 14   Ht 1.803 m (5' 11\")   Wt 80.7 kg (178 lb)   BMI 24.83 kg/m    Body mass index is 24.83 kg/m .  GENERAL: healthy, alert  SKIN: Postinflammatory macules without ulceration on his anterior chest  Diagnostic Test Results:  No results found for this or any previous visit (from the past 24 hour(s)).    ASSESSMENT/PLAN:     ASSESSMENT / PLAN:  (B02.9) Herpes zoster without complication  (primary encounter diagnosis)  Comment: This is resolving  Plan: *Discussed.  He should get the shingles vaccination    (G62.9) " Neuropathy  Comment: Reviewed his lab.  Plan: *He wonders about local care to reduce itching.  I recommend emollients          Kiel Torres MD        The information in this document, created by the medical scribe for me, accurately reflects the services I personally performed and the decisions made by me. I have reviewed and approved this document for accuracy prior to leaving the patient care area.  March 5, 2019 9:49 AM    Kiel Torres MD  [facility]

## 2019-03-06 LAB — T PALLIDUM AB SER QL: NONREACTIVE

## 2019-03-07 ENCOUNTER — OFFICE VISIT (OUTPATIENT)
Dept: UROLOGY | Facility: CLINIC | Age: 59
End: 2019-03-07
Payer: COMMERCIAL

## 2019-03-07 VITALS — BODY MASS INDEX: 24.92 KG/M2 | HEIGHT: 71 IN | WEIGHT: 178 LBS | HEART RATE: 92 BPM | OXYGEN SATURATION: 99 %

## 2019-03-07 DIAGNOSIS — C61 PROSTATE CANCER (H): Primary | ICD-10-CM

## 2019-03-07 LAB
ALBUMIN UR-MCNC: NEGATIVE MG/DL
APPEARANCE UR: CLEAR
BILIRUB UR QL STRIP: NEGATIVE
COLOR UR AUTO: YELLOW
GLUCOSE UR STRIP-MCNC: NEGATIVE MG/DL
HGB UR QL STRIP: ABNORMAL
KETONES UR STRIP-MCNC: NEGATIVE MG/DL
LEUKOCYTE ESTERASE UR QL STRIP: NEGATIVE
NITRATE UR QL: NEGATIVE
PH UR STRIP: 7 PH (ref 5–7)
SOURCE: ABNORMAL
SP GR UR STRIP: 1.02 (ref 1–1.03)
UROBILINOGEN UR STRIP-ACNC: 0.2 EU/DL (ref 0.2–1)

## 2019-03-07 PROCEDURE — 99213 OFFICE O/P EST LOW 20 MIN: CPT | Performed by: UROLOGY

## 2019-03-07 PROCEDURE — 81003 URINALYSIS AUTO W/O SCOPE: CPT | Mod: QW | Performed by: UROLOGY

## 2019-03-07 ASSESSMENT — MIFFLIN-ST. JEOR: SCORE: 1639.53

## 2019-03-07 ASSESSMENT — PAIN SCALES - GENERAL: PAINLEVEL: NO PAIN (0)

## 2019-03-07 NOTE — LETTER
3/7/2019       RE: Yogi Moreland  [id number] [address] [id number]     Dear Colleague,    Thank you for referring your patient, Yogi Moreland, to the [facility] [place] at [facility]. Please see a copy of my visit note below.    Yogi Moreland is a 58-year-old male who underwent radical prostatectomy and bilateral pelvic lymph node dissection in early 2017.  He was found to have grade 3+4 disease with negative surgical margins and negative lymph nodes.  His PSA was recently 0.02, 0.016 months ago.  This is not clinically significant and still remains less than 0.04.  We had a long discussion about his 10-year cancer free rate, appropriate follow-up and erectile function.  He is voiding without difficulty, good control and no dysuria or hematuria  Other past medical history: Surgery for diverticular disease, neuropathy, hyperlipidemia, herpes zoster recently, dermatitis.  Other surgeries include appendectomy, colonoscopies.  Non-smoker  Family history: Melanoma, mother  of metastatic carcinoma of some type  No colon cancer or prostate cancer in the family  Medications: Advil, Cialis 5 mg twice weekly, 10 mg on demand  Allergies: None  Exam: Alert and oriented, normal vital signs, normal appearance.  Normal cephalic, normal respirations, neuro grossly intact  Review of systems: As above  Urinalysis: Normal  Assessment: Clinically, his PSA is still in the undetectable range.  His 10-year cancer free survival is probably 80-85%.  He will use Cialis 10 mg on demand.  He will see me in 6 months for a PSA and again in 12 months.  After several more years will see him yearly with PSA.  Plan: As above-all discussed    Again, thank you for allowing me to participate in the care of your patient.      Sincerely,    Chuy Reddy MD

## 2019-03-07 NOTE — PROGRESS NOTES
Yogi Moreland is a 58-year-old male who underwent radical prostatectomy and bilateral pelvic lymph node dissection in early 2017.  He was found to have grade 3+4 disease with negative surgical margins and negative lymph nodes.  His PSA was recently 0.02, 0.016 months ago.  This is not clinically significant and still remains less than 0.04.  We had a long discussion about his 10-year cancer free rate, appropriate follow-up and erectile function.  He is voiding without difficulty, good control and no dysuria or hematuria  Other past medical history: Surgery for diverticular disease, neuropathy, hyperlipidemia, herpes zoster recently, dermatitis.  Other surgeries include appendectomy, colonoscopies.  Non-smoker  Family history: Melanoma, mother  of metastatic carcinoma of some type  No colon cancer or prostate cancer in the family  Medications: Advil, Cialis 5 mg twice weekly, 10 mg on demand  Allergies: None  Exam: Alert and oriented, normal vital signs, normal appearance.  Normal cephalic, normal respirations, neuro grossly intact  Review of systems: As above  Urinalysis: Normal  Assessment: Clinically, his PSA is still in the undetectable range.  His 10-year cancer free survival is probably 80-85%.  He will use Cialis 10 mg on demand.  He will see me in 6 months for a PSA and again in 12 months.  After several more years will see him yearly with PSA.  Plan: As above-all discussed

## 2019-05-21 ENCOUNTER — TELEPHONE (OUTPATIENT)
Dept: UROLOGY | Facility: CLINIC | Age: 59
End: 2019-05-21

## 2019-05-21 DIAGNOSIS — N52.9 ED (ERECTILE DYSFUNCTION): Primary | ICD-10-CM

## 2019-05-21 NOTE — TELEPHONE ENCOUNTER
Patient called and requested a refill on his ED medication. He would like to try a yuly. Will forward message to MD. Angie Ham LPN

## 2019-09-12 ENCOUNTER — TELEPHONE (OUTPATIENT)
Dept: UROLOGY | Facility: CLINIC | Age: 59
End: 2019-09-12

## 2019-09-12 NOTE — TELEPHONE ENCOUNTER
Health Call Center    Phone Message    May a detailed message be left on voicemail: no    Reason for Call: Medication Question or concern regarding medication   Prescription Clarification  Name of Medication: Cialis 5mg yuly form  Prescribing Provider: Dr. Reddy   Pharmacy: [facility], fax # [phone], ph # [phone].   What on the order needs clarification? Pt says the current dose is not strong enough and would like to know if he can get a 17mg version which is standard for this med. Please call Pt back to discuss.          Action Taken: Message routed to:  Other:  CLINICAL POOL

## 2019-09-17 DIAGNOSIS — C61 PROSTATE CANCER (H): ICD-10-CM

## 2019-09-17 PROCEDURE — 84153 ASSAY OF PSA TOTAL: CPT | Performed by: UROLOGY

## 2019-09-17 PROCEDURE — 36415 COLL VENOUS BLD VENIPUNCTURE: CPT | Performed by: UROLOGY

## 2019-09-18 LAB — PSA SERPL-MCNC: 0.02 UG/L (ref 0–4)

## 2019-09-29 ENCOUNTER — HEALTH MAINTENANCE LETTER (OUTPATIENT)
Age: 59
End: 2019-09-29

## 2020-02-18 ENCOUNTER — OFFICE VISIT (OUTPATIENT)
Dept: FAMILY MEDICINE | Facility: CLINIC | Age: 60
End: 2020-02-18
Payer: COMMERCIAL

## 2020-02-18 VITALS
HEART RATE: 70 BPM | BODY MASS INDEX: 25.66 KG/M2 | TEMPERATURE: 99 F | WEIGHT: 184 LBS | SYSTOLIC BLOOD PRESSURE: 128 MMHG | OXYGEN SATURATION: 99 % | DIASTOLIC BLOOD PRESSURE: 82 MMHG

## 2020-02-18 DIAGNOSIS — Z85.46 HISTORY OF PROSTATE CANCER: ICD-10-CM

## 2020-02-18 DIAGNOSIS — E78.5 HYPERLIPIDEMIA LDL GOAL <160: ICD-10-CM

## 2020-02-18 DIAGNOSIS — L72.3 SEBACEOUS CYST: ICD-10-CM

## 2020-02-18 DIAGNOSIS — R21 RASH: Primary | ICD-10-CM

## 2020-02-18 DIAGNOSIS — G62.9 NEUROPATHY: ICD-10-CM

## 2020-02-18 PROBLEM — B02.9 HERPES ZOSTER WITHOUT COMPLICATION: Status: RESOLVED | Noted: 2019-03-05 | Resolved: 2020-02-18

## 2020-02-18 LAB
KOH PREP SPEC: NORMAL
SPECIMEN SOURCE: NORMAL

## 2020-02-18 PROCEDURE — 99214 OFFICE O/P EST MOD 30 MIN: CPT | Performed by: FAMILY MEDICINE

## 2020-02-18 PROCEDURE — 36415 COLL VENOUS BLD VENIPUNCTURE: CPT | Performed by: UROLOGY

## 2020-02-18 PROCEDURE — 84153 ASSAY OF PSA TOTAL: CPT | Performed by: UROLOGY

## 2020-02-18 PROCEDURE — 87220 TISSUE EXAM FOR FUNGI: CPT | Performed by: FAMILY MEDICINE

## 2020-02-18 PROCEDURE — 80061 LIPID PANEL: CPT | Performed by: UROLOGY

## 2020-02-18 ASSESSMENT — ENCOUNTER SYMPTOMS
DYSURIA: 0
CHILLS: 0

## 2020-02-18 NOTE — PROGRESS NOTES
The 10-year ASCVD risk score (Marjan KNOX Jr., et al., 2013) is: 11.4%    Values used to calculate the score:      Age: 59 years      Sex: Male      Is Non- : No      Diabetic: No      Tobacco smoker: No      Systolic Blood Pressure: 128 mmHg      Is BP treated: No      HDL Cholesterol: 45 mg/dL      Total Cholesterol: 232 mg/dL    Answers for HPI/ROS submitted by the patient on 2/18/2020   Chronic problems general questions HPI Form  How many servings of fruits and vegetables do you eat daily?: 2-3  On average, how many sweetened beverages do you drink each day (Examples: soda, juice, sweet tea, etc.  Do NOT count diet or artificially sweetened beverages)?: 0  How many minutes a day do you exercise enough to make your heart beat faster?: 10 to 19  How many days a week do you exercise enough to make your heart beat faster?: 4  How many days per week do you miss taking your medication?: 0  Kiel Torres MD

## 2020-02-18 NOTE — PROGRESS NOTES
Subjective     Yogi Moreland is a 59 year old male who presents to clinic today for the following health issues:    History of Present Illness        He eats 2-3 servings of fruits and vegetables daily.He consumes 0 sweetened beverage(s) daily.He exercises with enough effort to increase his heart rate 10 to 19 minutes per day.  He exercises with enough effort to increase his heart rate 4 days per week.   He is taking medications regularly.     Rash  Onset: couple months    Description:   Location: left leg   Character: raised, red  Itching (Pruritis): YES    Progression of Symptoms:  worsening and intermittent    Accompanying Signs & Symptoms:  Fever: no   Body aches or joint pain: YES (left elbow)  Sore throat symptoms: no   Recent cold symptoms: no     History:   Previous similar rash: YES    Precipitating factors:   Exposure to similar rash: no   New exposures: None   Recent travel: no     Alleviating factors:  Hasn't tried    Therapies Tried and outcome:     Rash  (primary encounter diagnosis) left upper thigh.  Asymptomatic seems to be expanding a second one is appeared  Sebaceous cyst decades middle upper back no symptoms  Neuropathy hypoesthetic occasional pain  History of prostate cancer periodic PSA  Hyperlipidemia LDL goal <160 calculation cardiovascular risk is over 10% with older studies        WART(S)  Onset: 3 years    Description:   Location: middle of upper back  Number of warts: 1  Painful: no     Accompanying Signs & Symptoms:  Signs of infection: no     History:   History of trauma: no   Prior warts: no     Therapies Tried and outcome: none  Past Medical History:   Diagnosis Date     Colon polyp      Colonic diverticular abscess      Dermatitis 2/12/2019     Elevated LFTs 8/2016     Elevated PSA      Enlarged prostate      Herpes zoster without complication 3/5/2019     History of prostate cancer 4/6/2017     Hyperlipidemia      Neuropathy 02/12/2019     Prostate cancer (H)      S/P colostomy  takedown 12/27/2016     Sebaceous cyst 2/12/2019       Past Surgical History:   Procedure Laterality Date     APPENDECTOMY OPEN N/A 9/26/2016    Procedure: APPENDECTOMY OPEN;  Surgeon: Ramón Masters MD;  Location: [facility] OR     COLECTOMY WITH COLOSTOMY, COMBINED N/A 9/26/2016    Procedure: COMBINED COLECTOMY WITH COLOSTOMY;  Surgeon: Ramón Masters MD;  Location: [facility] OR     COLONOSCOPY  2009     COLONOSCOPY  2/2013     COLONOSCOPY N/A 3/22/2018    Procedure: COLONOSCOPY;  COLONOSCOPY;  Surgeon: Chris Barrios MD;  Location: [facility] GI     HERNIORRHAPHY INGUINAL Left 12/15/2017    Procedure: HERNIORRHAPHY INGUINAL;  Left Inguinal Herniorrhaphy with Mesh;  Surgeon: Ramón Masters MD;  Location: [facility] OR     LAPAROSCOPIC ASSISTED COLOSTOMY TAKEDOWN N/A 12/27/2016    Procedure: LAPAROSCOPIC ASSISTED COLOSTOMY TAKEDOWN;  Surgeon: Ramón Masters MD;  Location: [facility] OR     LAPAROTOMY EXPLORATORY N/A 9/26/2016    Procedure: LAPAROTOMY EXPLORATORY;  Surgeon: Ramón Masters MD;  Location: [facility] OR     PROSTATECTOMY RETROPUBIC RADICAL N/A 7/11/2017    Procedure: PROSTATECTOMY RETROPUBIC RADICAL;  Retropubic Radical Prostectectomy, Bilateral pelvic Lymph Node Dissection ;  Surgeon: Chuy Reddy MD;  Location: [facility] OR       Family History   Problem Relation Age of Onset     Cancer Mother         metastatic     Cancer Father         melanoma      Colon Cancer No family hx of        Social History     Tobacco Use     Smoking status: Never Smoker     Smokeless tobacco: Never Used   Substance Use Topics     Alcohol use: Yes     Alcohol/week: 0.0 standard drinks     Comment: 7-10 week             Reviewed and updated as needed this visit by Provider         Review of Systems   Constitutional: Negative for chills.        Weight slightly up   Genitourinary: Negative for dysuria.            Objective    There were no vitals taken for this visit.  There is no height or weight on file to calculate BMI.   /82 (BP Location:  Right arm, Patient Position: Sitting, Cuff Size: Adult Large)   Pulse 70   Temp 99  F (37.2  C) (Oral)   Wt 83.5 kg (184 lb)   SpO2 99%   BMI 25.66 kg/m     Physical Exam   Erythematous rings with slight central clearing with scale 1/2 cm #2 left upper thigh  Typical protuberant sebaceous cyst with overlying oxidized comedone upper middle thoracic spine  KOH neg        Assessment & Plan   Problem List Items Addressed This Visit        Nervous and Auditory    Neuropathy     Mostly painless sometimes gnawing.  Discussed options.  Trial of topical capsaicin            Endocrine    Hyperlipidemia LDL goal <160     Risk calculation with older studies would warrant statin therapy he would like to reassess.  Discussed current recommendations         Relevant Orders    Lipid panel reflex to direct LDL Non-fasting (Completed)       Musculoskeletal and Integumentary    Sebaceous cyst     Overlying upper thoracic spine, asymptomatic.  Oxidized comedone.  Discussed natural history treatment options.  He elects observation and lack of manipulation         Rash - Primary     2 patches erythematous with scale left upper thigh.  KOH negative treat as eczema.  Xerosis discussed         Relevant Orders    KOH prep (skin, hair or nails only) (Completed)       Other    History of prostate cancer     Periodic PSA                      Return in about 1 year (around 2/18/2021).    Kiel Torres MD  [facility]

## 2020-02-19 LAB
CHOLEST SERPL-MCNC: 260 MG/DL
HDLC SERPL-MCNC: 35 MG/DL
LDLC SERPL CALC-MCNC: 170 MG/DL
NONHDLC SERPL-MCNC: 225 MG/DL
PSA SERPL-MCNC: 0.03 UG/L (ref 0–4)
TRIGL SERPL-MCNC: 276 MG/DL

## 2020-02-19 NOTE — ASSESSMENT & PLAN NOTE
Overlying upper thoracic spine, asymptomatic.  Oxidized comedone.  Discussed natural history treatment options.  He elects observation and lack of manipulation

## 2020-02-19 NOTE — ASSESSMENT & PLAN NOTE
Risk calculation with older studies would warrant statin therapy he would like to reassess.  Discussed current recommendations

## 2020-02-19 NOTE — ASSESSMENT & PLAN NOTE
2 patches erythematous with scale left upper thigh.  KOH negative treat as eczema.  Xerosis discussed

## 2020-02-20 NOTE — RESULT ENCOUNTER NOTE
Cholesterol is increasing, as it does.  With that, your heart attack risk is increased as well.  It is now about 1 and 6.  Shall we start a medicine question?  iKel Torres MD

## 2020-03-03 ENCOUNTER — OFFICE VISIT (OUTPATIENT)
Dept: UROLOGY | Facility: CLINIC | Age: 60
End: 2020-03-03
Payer: COMMERCIAL

## 2020-03-03 VITALS
DIASTOLIC BLOOD PRESSURE: 80 MMHG | HEIGHT: 71 IN | SYSTOLIC BLOOD PRESSURE: 118 MMHG | WEIGHT: 184 LBS | BODY MASS INDEX: 25.76 KG/M2 | HEART RATE: 80 BPM

## 2020-03-03 DIAGNOSIS — C61 PROSTATE CANCER (H): Primary | ICD-10-CM

## 2020-03-03 PROCEDURE — 99212 OFFICE O/P EST SF 10 MIN: CPT | Performed by: UROLOGY

## 2020-03-03 ASSESSMENT — MIFFLIN-ST. JEOR: SCORE: 1661.75

## 2020-03-03 ASSESSMENT — PAIN SCALES - GENERAL: PAINLEVEL: NO PAIN (0)

## 2020-03-03 NOTE — NURSING NOTE
Here for prostate cancer follow up. Doing well with voiding. And on his ED medication.Suri Santiago LPN

## 2020-03-03 NOTE — LETTER
3/3/2020       RE: Yogi Moreland  [address] [address] [address]     Dear Colleague,    Thank you for referring your patient, Yogi Moreland, to the [facility] [place] at [facility]. Please see a copy of my visit note below.    Yogi is a 59-year-old male who underwent radical prostatectomy several years ago for grade 3+4 disease.  He had negative surgical margins and negative lymph nodes.  Surgery was in July 2017.  His PSA remains stable at 0.03.  Definition of recurrence would be above 0.2.  He is having no voiding problems and uses Cialis in yuly form for erections.  Other past medical history: Inflammatory diverticular disease with surgery, neuropathy, hyperlipidemia, herpes zoster, dermatitis, colonoscopies, appendectomy, non-smoker  Medications: Cialis, ibuprofen  Allergies: None  Review of systems: No dysuria or hematuria  Exam: Alert and oriented, normal appearance, normal vital signs.  Normal respirations, neuro grossly intact  Assessment: Adenocarcinoma the prostate.  PSA stable at 0.03.  Plan: PSA in 6 months, see me in 12 months with PSA    Again, thank you for allowing me to participate in the care of your patient.      Sincerely,    Chuy Reddy MD

## 2020-03-03 NOTE — PROGRESS NOTES
Yogi is a 59-year-old male who underwent radical prostatectomy several years ago for grade 3+4 disease.  He had negative surgical margins and negative lymph nodes.  Surgery was in July 2017.  His PSA remains stable at 0.03.  Definition of recurrence would be above 0.2.  He is having no voiding problems and uses Cialis in yuly form for erections.  Other past medical history: Inflammatory diverticular disease with surgery, neuropathy, hyperlipidemia, herpes zoster, dermatitis, colonoscopies, appendectomy, non-smoker  Medications: Cialis, ibuprofen  Allergies: None  Review of systems: No dysuria or hematuria  Exam: Alert and oriented, normal appearance, normal vital signs.  Normal respirations, neuro grossly intact  Assessment: Adenocarcinoma the prostate.  PSA stable at 0.03.  Plan: PSA in 6 months, see me in 12 months with PSA

## 2020-06-22 ENCOUNTER — TELEPHONE (OUTPATIENT)
Dept: SURGERY | Facility: CLINIC | Age: 60
End: 2020-06-22

## 2020-06-22 ENCOUNTER — PREP FOR PROCEDURE (OUTPATIENT)
Dept: SURGERY | Facility: CLINIC | Age: 60
End: 2020-06-22

## 2020-06-22 ENCOUNTER — OFFICE VISIT (OUTPATIENT)
Dept: SURGERY | Facility: CLINIC | Age: 60
End: 2020-06-22
Payer: COMMERCIAL

## 2020-06-22 VITALS
RESPIRATION RATE: 16 BRPM | DIASTOLIC BLOOD PRESSURE: 72 MMHG | HEART RATE: 77 BPM | SYSTOLIC BLOOD PRESSURE: 115 MMHG | OXYGEN SATURATION: 96 % | WEIGHT: 175 LBS | HEIGHT: 72 IN | BODY MASS INDEX: 23.7 KG/M2

## 2020-06-22 DIAGNOSIS — K40.90 RIGHT INGUINAL HERNIA: Primary | ICD-10-CM

## 2020-06-22 DIAGNOSIS — Z11.59 ENCOUNTER FOR SCREENING FOR OTHER VIRAL DISEASES: Primary | ICD-10-CM

## 2020-06-22 PROCEDURE — 99214 OFFICE O/P EST MOD 30 MIN: CPT | Performed by: SURGERY

## 2020-06-22 ASSESSMENT — MIFFLIN-ST. JEOR: SCORE: 1636.79

## 2020-06-22 NOTE — PROGRESS NOTES
HPI:  Yogi is a 59 year old male who presents for evaluation of right inguinal bulging.  The patient is known to me from previous colon surgery and from a left inguinal hernia repair done in 2017.  We chose to do that with an open technique due to the patient's previous surgeries.  The patient has now noticed bulging on the right side.  This does not cause pain, but has been getting larger.  He is able to push it back in.    Past Medical History:   has a past medical history of Colon polyp, Colonic diverticular abscess, Dermatitis (2/12/2019), Elevated LFTs (8/2016), Elevated PSA, Enlarged prostate, Herpes zoster without complication (3/5/2019), History of prostate cancer (4/6/2017), Hyperlipidemia, Neuropathy (02/12/2019), Prostate cancer (H), S/P colostomy takedown (12/27/2016), and Sebaceous cyst (2/12/2019).    Past Surgical History:  Past Surgical History:   Procedure Laterality Date     APPENDECTOMY OPEN N/A 9/26/2016    Procedure: APPENDECTOMY OPEN;  Surgeon: Ramón Masters MD;  Location: [facility] OR     COLECTOMY WITH COLOSTOMY, COMBINED N/A 9/26/2016    Procedure: COMBINED COLECTOMY WITH COLOSTOMY;  Surgeon: Ramón Masters MD;  Location: [facility] OR     COLONOSCOPY  2009     COLONOSCOPY  2/2013     COLONOSCOPY N/A 3/22/2018    Procedure: COLONOSCOPY;  COLONOSCOPY;  Surgeon: Chris Barrios MD;  Location: [facility] GI     HERNIORRHAPHY INGUINAL Left 12/15/2017    Procedure: HERNIORRHAPHY INGUINAL;  Left Inguinal Herniorrhaphy with Mesh;  Surgeon: Ramón Masters MD;  Location: [facility]     LAPAROSCOPIC ASSISTED COLOSTOMY TAKEDOWN N/A 12/27/2016    Procedure: LAPAROSCOPIC ASSISTED COLOSTOMY TAKEDOWN;  Surgeon: Ramón Masters MD;  Location: [facility]     LAPAROTOMY EXPLORATORY N/A 9/26/2016    Procedure: LAPAROTOMY EXPLORATORY;  Surgeon: Ramón Masters MD;  [facility]: [facility]     PROSTATECTOMY RETROPUBIC RADICAL N/A 7/11/2017    Procedure: PROSTATECTOMY RETROPUBIC RADICAL;  Retropubic Radical Prostectectomy,  Bilateral pelvic Lymph Node Dissection ;  Surgeon: Chuy Reddy MD;  Location: [facility] OR        Social History:  Social History     Socioeconomic History     Marital status:      Spouse name: Not on file     Number of children: Not on file     Years of education: Not on file     Highest education level: Not on file   Occupational History     Occupation:      Employer: [employer]   Social Needs     Financial resource strain: Not on file     Food insecurity     Worry: Not on file     Inability: Not on file     Transportation needs     Medical: Not on file     Non-medical: Not on file   Tobacco Use     Smoking status: Never Smoker     Smokeless tobacco: Never Used   Substance and Sexual Activity     Alcohol use: Yes     Alcohol/week: 0.0 standard drinks     Comment: 7-10 week     Drug use: No     Sexual activity: Yes     Partners: Female   Lifestyle     Physical activity     Days per week: Not on file     Minutes per session: Not on file     Stress: Not on file   Relationships     Social connections     Talks on phone: Not on file     Gets together: Not on file     Attends Yarsani service: Not on file     Active member of club or organization: Not on file     Attends meetings of clubs or organizations: Not on file     Relationship status: Not on file     Intimate partner violence     Fear of current or ex partner: Not on file     Emotionally abused: Not on file     Physically abused: Not on file     Forced sexual activity: Not on file   Other Topics Concern     Parent/sibling w/ CABG, MI or angioplasty before 65F 55M? Not Asked   Social History Narrative     Not on file        Family History:  Family History   Problem Relation Age of Onset     Cancer Mother         metastatic     Cancer Father         melanoma      Colon Cancer No family hx of          ROS:  The 10 point review of systems is negative other than noted in the HPI and above.    PE:    General- Well-developed, well-nourished,  patient able to get up on table without difficulty.  HEENT- Normocephalic and atraumatic. Pupils equal and round.  Mucous membranes moist.  Sclera are nonicteric.  Neck- No lymphadenopathy or masses   Respirations- are regular and non labored  Abdomen is abdomen is soft without significant tenderness, masses, organomegaly or guarding  Hernia- Left inguinal hernia is not present with valsalva              Right inguinal hernia is present with valsalva              The hernia is reducible   Umbilical hernia is not present.              External genitalia are normal               Assessment: right inguinal hernia    Plan: I have recommended an open repair of the patient's right inguinal hernia with mesh.  We have discussed observation, reduction techniques and importance, incarceration and strangulation signs, symptoms and importance as well as need to seek emergency treatment.    We have discussed surgery in detail, including risk, benefits, complications, mesh, infection, nerve and cord damage and their sequelae including chronic pain and testicular loss, lifting and activity limits after surgery. He has been given literature to review. We will schedule surgery at patient's convenience.        Ramón Masters MD    Please route or send letter to:  Primary Care Provider (PCP)       no

## 2020-06-22 NOTE — TELEPHONE ENCOUNTER
Type of surgery: OPEN RIGHT INGUINAL HERNIA REPAIR WITH MESH   Location of surgery: [facility]  Date and time of surgery: 7-14-20, 9:30 AM   Surgeon: DR. BRENNER   Pre-Op Appt Date: PATIENT TO SCHEDULE   Post-Op Appt Date: PATIENT TO SCHEDULE    Packet sent out: GIVEN TO PATIENT   Pre-cert/Authorization completed:  Not Applicable  Date: 6-22-20       OPEN RIGHT INGUINAL HERNIA REPAIR WITH MESH   GENERAL   PT INST TO HAVE H&P WITH DR. HENDERSON  75 MINS REQ   PA ASSIST DFB   ALW

## 2020-06-22 NOTE — LETTER
June 22, 2020          Randolph Mueller MD      RE:   Yogi Moreland 1960      Dear Colleague,    Thank you for referring your patient, Yogi Moreland, to [facility] at [facility]. Please see a copy of my visit note below.    Yogi is a 59 year old male who presents for evaluation of right inguinal bulging. The patient is known to me from previous colon surgery and from a left inguinal hernia repair done in 2017. We chose to do that with an open technique due to the patient's previous surgeries. The patient has now noticed bulging on the right side. This does not cause pain, but has been getting larger. He is able to push it back in.     Upon exam, abdomen is soft without significant tenderness, masses, organomegaly or guarding.    Hernia- Left inguinal hernia is not present with valsalva.  Right inguinal hernia is present with valsalva.  The hernia is reducible.  Umbilical hernia is not present.     Assessment: right inguinal hernia     Plan: I have recommended an open repair of the patient's right inguinal hernia with mesh.   We have discussed observation, reduction techniques and importance, incarceration and strangulation signs, symptoms and importance as well as need to seek emergency treatment.   We have discussed surgery in detail, including risk, benefits, complications, mesh, infection, nerve and cord damage and their sequelae including chronic pain and testicular loss, lifting and activity limits after surgery. He has been given literature to review. We will schedule surgery at patient's convenience.      Again, thank you for allowing me to participate in the care of your patient.      Sincerely,      Ramón Masters MD

## 2020-06-25 NOTE — PROGRESS NOTES
Pre-Visit Planning     Future Appointments   Date Time Provider Department Center   6/26/2020  2:00 PM Randolph Mueller MD [facility]   7/14/2020  9:30 AM Ramón Masters MD [facility]     Arrival Time for this Appointment:  1:40 PM   Appointment Notes for this encounter:   Pre-op 07/14/2020-Hernia    Questionnaires Reviewed/Assigned  No additional questionnaires are needed        Patient preferred phone number: [phone]    Unable to reach patient and unable to leave voicemail.     Kim Meneses, BSN, RN, PHN

## 2020-06-26 ENCOUNTER — OFFICE VISIT (OUTPATIENT)
Dept: FAMILY MEDICINE | Facility: CLINIC | Age: 60
End: 2020-06-26
Payer: COMMERCIAL

## 2020-06-26 VITALS
HEIGHT: 71 IN | HEART RATE: 86 BPM | BODY MASS INDEX: 24.92 KG/M2 | DIASTOLIC BLOOD PRESSURE: 66 MMHG | TEMPERATURE: 98.5 F | RESPIRATION RATE: 20 BRPM | OXYGEN SATURATION: 96 % | WEIGHT: 178 LBS | SYSTOLIC BLOOD PRESSURE: 104 MMHG

## 2020-06-26 DIAGNOSIS — Z01.818 PREOP GENERAL PHYSICAL EXAM: Primary | ICD-10-CM

## 2020-06-26 DIAGNOSIS — R73.01 IMPAIRED FASTING GLUCOSE: ICD-10-CM

## 2020-06-26 LAB
BASOPHILS # BLD AUTO: 0 10E9/L (ref 0–0.2)
BASOPHILS NFR BLD AUTO: 0.4 %
DIFFERENTIAL METHOD BLD: NORMAL
EOSINOPHIL # BLD AUTO: 0.1 10E9/L (ref 0–0.7)
EOSINOPHIL NFR BLD AUTO: 1.5 %
ERYTHROCYTE [DISTWIDTH] IN BLOOD BY AUTOMATED COUNT: 13.3 % (ref 10–15)
HCT VFR BLD AUTO: 46.4 % (ref 40–53)
HGB BLD-MCNC: 15.5 G/DL (ref 13.3–17.7)
LYMPHOCYTES # BLD AUTO: 1.5 10E9/L (ref 0.8–5.3)
LYMPHOCYTES NFR BLD AUTO: 19.5 %
MCH RBC QN AUTO: 30.6 PG (ref 26.5–33)
MCHC RBC AUTO-ENTMCNC: 33.4 G/DL (ref 31.5–36.5)
MCV RBC AUTO: 92 FL (ref 78–100)
MONOCYTES # BLD AUTO: 0.6 10E9/L (ref 0–1.3)
MONOCYTES NFR BLD AUTO: 7.4 %
NEUTROPHILS # BLD AUTO: 5.3 10E9/L (ref 1.6–8.3)
NEUTROPHILS NFR BLD AUTO: 71.2 %
PLATELET # BLD AUTO: 218 10E9/L (ref 150–450)
RBC # BLD AUTO: 5.06 10E12/L (ref 4.4–5.9)
WBC # BLD AUTO: 7.5 10E9/L (ref 4–11)

## 2020-06-26 PROCEDURE — 83036 HEMOGLOBIN GLYCOSYLATED A1C: CPT | Performed by: FAMILY MEDICINE

## 2020-06-26 PROCEDURE — 80048 BASIC METABOLIC PNL TOTAL CA: CPT | Performed by: FAMILY MEDICINE

## 2020-06-26 PROCEDURE — 99214 OFFICE O/P EST MOD 30 MIN: CPT | Performed by: FAMILY MEDICINE

## 2020-06-26 PROCEDURE — 85025 COMPLETE CBC W/AUTO DIFF WBC: CPT | Performed by: FAMILY MEDICINE

## 2020-06-26 PROCEDURE — 93000 ELECTROCARDIOGRAM COMPLETE: CPT | Performed by: FAMILY MEDICINE

## 2020-06-26 PROCEDURE — 36415 COLL VENOUS BLD VENIPUNCTURE: CPT | Performed by: FAMILY MEDICINE

## 2020-06-26 ASSESSMENT — MIFFLIN-ST. JEOR: SCORE: 1634.53

## 2020-06-26 NOTE — PROGRESS NOTES
[facility]  [address] [address] [facility] [id number] [phone]  Dept: [phone]    PRE-OP EVALUATION:  Today's date: 2020    Yogi Moreland (: 1960) presents for pre-operative evaluation assessment as requested by Dr. Masters.  He requires evaluation and anesthesia risk assessment prior to undergoing surgery/procedure for treatment of  OPEN RIGHT INGUINAL HERNIA REPAIR WITH MESH  .    Fax number for surgical facility: [phone]  Primary Physician: Randolph Mueller  Type of Anesthesia Anticipated: General    Patient has a Health Care Directive or Living Will:  NO    Preop Questions 2020   Who is doing your surgery? Dr. Masters   What are you having done? Hernia repair   Date of Surgery/Procedure:    Facility or Hospital where procedure/surgery will be performed: [facility]   1.  Do you have a history of Heart attack, stroke, stent, coronary bypass surgery, or other heart surgery? No   2.  Do you ever have any pain or discomfort in your chest? No   3.  Do you have a history of  Heart Failure? No   4.   Are you troubled by shortness of breath when:  walking on a level surface, or up a slight hill, or at night? No   5.  Do you currently have a cold, bronchitis or other respiratory infection? No   6.  Do you have a cough, shortness of breath, or wheezing? No   7.  Do you sometimes get pains in the calves of your legs when you walk? No   8. Do you or anyone in your family have previous history of blood clots? No   9.  Do you or does anyone in your family have a serious bleeding problem such as prolonged bleeding following surgeries or cuts? No   10. Have you ever had problems with anemia or been told to take iron pills? No   11. Have you had any abnormal blood loss such as black, tarry or bloody stools? No   12. Have you ever had a blood transfusion? No   13. Have you or any of your relatives ever had problems with anesthesia? No   14. Do you have  sleep apnea, excessive snoring or daytime drowsiness? No   15. Do you have any prosthetic heart valves? No   16. Do you have prosthetic joints? No         HPI:     HPI related to upcoming procedure: right inguinal  Hernia  Repair           MEDICAL HISTORY:     Patient Active Problem List    Diagnosis Date Noted     Right inguinal hernia 06/22/2020     Priority: Medium     Added automatically from request for surgery [id number]       Rash 02/18/2020     Priority: Medium     Neuropathy 02/12/2019     Priority: Medium     Dermatitis 02/12/2019     Priority: Medium     Sebaceous cyst 02/12/2019     Priority: Medium     History of prostate cancer 04/06/2017     Priority: Medium     S/P colostomy takedown 12/27/2016     Priority: Medium     Diverticulitis of colon with perforation 09/26/2016     Priority: Medium     Elevated LFTs 09/02/2016     Priority: Medium     History of colon polyps 01/23/2013     Priority: Medium     Hyperlipidemia LDL goal <160 01/23/2013     Priority: Medium     The 10-year ASCVD risk score (Marjan KNOX Jr., et al., 2013) is: 14.8%    Values used to calculate the score:      Age: 59 years      Sex: Male      Is Non- : No      Diabetic: No      Tobacco smoker: No      Systolic Blood Pressure: 128 mmHg      Is BP treated: No      HDL Cholesterol: 35 mg/dL      Total Cholesterol: 260 mg/dL         Impaired fasting glucose 01/23/2013     Priority: Medium      Past Medical History:   Diagnosis Date     Colon polyp      Colonic diverticular abscess      Dermatitis 2/12/2019     Elevated LFTs 8/2016     Elevated PSA      Enlarged prostate      Herpes zoster without complication 3/5/2019     History of prostate cancer 4/6/2017     Hyperlipidemia      Neuropathy 02/12/2019     Prostate cancer (H)      S/P colostomy takedown 12/27/2016     Sebaceous cyst 2/12/2019     Past Surgical History:   Procedure Laterality Date     APPENDECTOMY OPEN N/A 9/26/2016    Procedure: APPENDECTOMY OPEN;   Surgeon: Ramón Masters MD;  [facility]: [facility]     COLECTOMY WITH COLOSTOMY, COMBINED N/A 9/26/2016    Procedure: COMBINED COLECTOMY WITH COLOSTOMY;  Surgeon: Ramón Masters MD;  [facility]: [facility]     COLONOSCOPY  2009     COLONOSCOPY  2/2013     COLONOSCOPY N/A 3/22/2018    Procedure: COLONOSCOPY;  COLONOSCOPY;  Surgeon: Chris Barrios MD;  [facility]: [facility]     HERNIORRHAPHY INGUINAL Left 12/15/2017    Procedure: HERNIORRHAPHY INGUINAL;  Left Inguinal Herniorrhaphy with Mesh;  Surgeon: Ramón Masters MD;  [facility]: [facility]     LAPAROSCOPIC ASSISTED COLOSTOMY TAKEDOWN N/A 12/27/2016    Procedure: LAPAROSCOPIC ASSISTED COLOSTOMY TAKEDOWN;  Surgeon: Ramón Masters MD;  [facility]     LAPAROTOMY EXPLORATORY N/A 9/26/2016    Procedure: LAPAROTOMY EXPLORATORY;  Surgeon: Ramón Masters MD;  [facility]: [facility]     PROSTATECTOMY RETROPUBIC RADICAL N/A 7/11/2017    Procedure: PROSTATECTOMY RETROPUBIC RADICAL;  Retropubic Radical Prostectectomy, Bilateral pelvic Lymph Node Dissection ;  Surgeon: Chuy Reddy MD;  Location: [facility] OR     Current Outpatient Medications   Medication Sig Dispense Refill     COMPOUNDED NON-CONTROLLED SUBSTANCE (CMPD RX) - PHARMACY TO MIX COMPOUNDED MEDICATION Tadalafil touche 17.5mg  # 30 with 3 refills . Dissolve one between cheek  prior to sexual intercourse . PRN 30 each 3     COMPOUNDED NON-CONTROLLED SUBSTANCE (CMPD RX) - PHARMACY TO MIX COMPOUNDED MEDICATION Cialis 5mg yuly form (Patient not taking: Reported on 3/3/2020) 30 lozenge 3     Ibuprofen (ADVIL PO) Take 400 mg by mouth every 6 hours as needed for moderate pain       Tadalafil (CIALIS PO) Take 5 mg by mouth        OTC products: None, except as noted above    No Known Allergies   Latex Allergy: NO    Social History     Tobacco Use     Smoking status: Never Smoker     Smokeless tobacco: Never Used   Substance Use Topics     Alcohol use: Yes     Alcohol/week: 0.0 standard drinks     Comment: 7-10 week     History    Drug Use No       REVIEW OF SYSTEMS:   Constitutional, neuro, ENT, endocrine, pulmonary, cardiac, gastrointestinal, genitourinary, musculoskeletal, integument and psychiatric systems are negative, except as otherwise noted.    EXAM:   There were no vitals taken for this visit.    GENERAL APPEARANCE: healthy, alert and no distress     EYES: EOMI,  PERRL     HENT: ear canals and TM's normal and nose and mouth without ulcers or lesions     NECK: no adenopathy, no asymmetry, masses, or scars and thyroid normal to palpation     RESP: lungs clear to auscultation - no rales, rhonchi or wheezes     CV: regular rates and rhythm, normal S1 S2, no S3 or S4 and no murmur, click or rub     ABDOMEN:  soft, nontender, no HSM or masses and bowel sounds normal     MS: extremities normal- no gross deformities noted, no evidence of inflammation in joints, FROM in all extremities.     SKIN: no suspicious lesions or rashes     NEURO: Normal strength and tone, sensory exam grossly normal, mentation intact and speech normal     PSYCH: mentation appears normal. and affect normal/bright     LYMPHATICS: No cervical adenopathy    DIAGNOSTICS:   EKG: appears normal, NSR, normal axis, normal intervals, no acute ST/T changes c/w ischemia, no LVH by voltage criteria, unchanged from previous tracings    Recent Labs   Lab Test 12/12/17  1040 11/22/17  0953 07/12/17  0741 07/11/17  1625  09/04/16  2240  08/26/16  1038   HGB 15.9  --  12.5* 13.6   < > 14.0   < > 14.7   PLT  --   --  205 230   < > 333   < > 218   INR  --   --   --   --   --  1.01  --   --    NA  --  139 140 140   < > 137   < > 135   POTASSIUM  --  5.1 4.1 4.5   < > 4.0   < > 4.2   CR  --  0.86 0.80 0.89   < > 0.74   < > 0.93   A1C  --   --   --   --   --   --   --  5.6    < > = values in this interval not displayed.        IMPRESSION:   Reason for surgery/procedure: inguinal hernia  repair  Diagnosis/reason for consult: preop exam    The proposed surgical procedure is considered LOW  risk.    REVISED CARDIAC RISK INDEX  The patient has the following serious cardiovascular risks for perioperative complications such as (MI, PE, VFib and 3  AV Block):  No serious cardiac risks  INTERPRETATION: 1 risks: Class II (low risk - 0.9% complication rate)    The patient has the following additional risks for perioperative complications:  No identified additional risks    No diagnosis found.    RECOMMENDATIONS:     ekg normal, labs pending     --Patient is to take all scheduled medications on the day of surgery EXCEPT for modifications listed below.    APPROVAL GIVEN to proceed with proposed procedure, without further diagnostic evaluation       Signed Electronically by: Randolph Mueller MD    Copy of this evaluation report is provided to requesting physician.    [facility] Preop Guidelines    Revised Cardiac Risk Index

## 2020-06-27 LAB
ANION GAP SERPL CALCULATED.3IONS-SCNC: 4 MMOL/L (ref 3–14)
BUN SERPL-MCNC: 27 MG/DL (ref 7–30)
CALCIUM SERPL-MCNC: 9.1 MG/DL (ref 8.5–10.1)
CHLORIDE SERPL-SCNC: 107 MMOL/L (ref 94–109)
CO2 SERPL-SCNC: 26 MMOL/L (ref 20–32)
CREAT SERPL-MCNC: 0.91 MG/DL (ref 0.66–1.25)
GFR SERPL CREATININE-BSD FRML MDRD: >90 ML/MIN/{1.73_M2}
GLUCOSE SERPL-MCNC: 150 MG/DL (ref 70–99)
POTASSIUM SERPL-SCNC: 4 MMOL/L (ref 3.4–5.3)
SODIUM SERPL-SCNC: 137 MMOL/L (ref 133–144)

## 2020-06-29 DIAGNOSIS — R73.01 IMPAIRED FASTING GLUCOSE: Primary | ICD-10-CM

## 2020-06-29 LAB — HBA1C MFR BLD: 5.6 % (ref 0–5.6)

## 2020-06-29 NOTE — PROGRESS NOTES
Patient seen for CBC and BMP on 6/26/20. Fasting glucose was elevated, Dr. Mueller wants A1C. Patient advised by Dr. Mueller via [place] [facility] to schedule lab only appointment. Call to patient to see if he would like to have A1C run off of CBC sample, left voicemail requesting patient call back. Per lab, CBC sample is kept for a week.   Kim Meneses, CHASTITYN, RN, PHN

## 2020-07-06 ENCOUNTER — MYC MEDICAL ADVICE (OUTPATIENT)
Dept: FAMILY MEDICINE | Facility: CLINIC | Age: 60
End: 2020-07-06

## 2020-07-09 ENCOUNTER — PREP FOR PROCEDURE (OUTPATIENT)
Dept: SURGERY | Facility: CLINIC | Age: 60
End: 2020-07-09

## 2020-07-11 DIAGNOSIS — Z11.59 ENCOUNTER FOR SCREENING FOR OTHER VIRAL DISEASES: ICD-10-CM

## 2020-07-11 PROCEDURE — U0003 INFECTIOUS AGENT DETECTION BY NUCLEIC ACID (DNA OR RNA); SEVERE ACUTE RESPIRATORY SYNDROME CORONAVIRUS 2 (SARS-COV-2) (CORONAVIRUS DISEASE [COVID-19]), AMPLIFIED PROBE TECHNIQUE, MAKING USE OF HIGH THROUGHPUT TECHNOLOGIES AS DESCRIBED BY CMS-2020-01-R: HCPCS | Performed by: SURGERY

## 2020-07-11 PROCEDURE — 99207 ZZC NO BILLABLE SERVICE THIS VISIT: CPT

## 2020-07-12 LAB
SARS-COV-2 RNA SPEC QL NAA+PROBE: NOT DETECTED
SPECIMEN SOURCE: NORMAL

## 2020-07-14 ENCOUNTER — TELEPHONE (OUTPATIENT)
Dept: SURGERY | Facility: CLINIC | Age: 60
End: 2020-07-14

## 2020-07-14 ENCOUNTER — SURGERY (OUTPATIENT)
Age: 60
End: 2020-07-14
Payer: COMMERCIAL

## 2020-07-14 ENCOUNTER — APPOINTMENT (OUTPATIENT)
Dept: SURGERY | Facility: PHYSICIAN GROUP | Age: 60
End: 2020-07-14
Payer: COMMERCIAL

## 2020-07-14 ENCOUNTER — ANESTHESIA EVENT (OUTPATIENT)
Dept: SURGERY | Facility: CLINIC | Age: 60
End: 2020-07-14
Payer: COMMERCIAL

## 2020-07-14 ENCOUNTER — ANESTHESIA (OUTPATIENT)
Dept: SURGERY | Facility: CLINIC | Age: 60
End: 2020-07-14
Payer: COMMERCIAL

## 2020-07-14 ENCOUNTER — HOSPITAL ENCOUNTER (OUTPATIENT)
Facility: CLINIC | Age: 60
Discharge: HOME | End: 2020-07-14
Attending: SURGERY | Admitting: SURGERY
Payer: COMMERCIAL

## 2020-07-14 VITALS
HEART RATE: 60 BPM | DIASTOLIC BLOOD PRESSURE: 77 MMHG | BODY MASS INDEX: 24.69 KG/M2 | TEMPERATURE: 97.4 F | RESPIRATION RATE: 16 BRPM | WEIGHT: 176.4 LBS | OXYGEN SATURATION: 95 % | HEIGHT: 71 IN | SYSTOLIC BLOOD PRESSURE: 121 MMHG

## 2020-07-14 DIAGNOSIS — K40.90 RIGHT INGUINAL HERNIA: ICD-10-CM

## 2020-07-14 PROCEDURE — 71000027 ZZH RECOVERY PHASE 2 EACH 15 MINS: Performed by: SURGERY

## 2020-07-14 PROCEDURE — 25000128 H RX IP 250 OP 636: Performed by: PHYSICIAN ASSISTANT

## 2020-07-14 PROCEDURE — 27210794 ZZH OR GENERAL SUPPLY STERILE: Performed by: SURGERY

## 2020-07-14 PROCEDURE — 25000132 ZZH RX MED GY IP 250 OP 250 PS 637: Performed by: SURGERY

## 2020-07-14 PROCEDURE — 25000125 ZZHC RX 250: Performed by: SURGERY

## 2020-07-14 PROCEDURE — 88304 TISSUE EXAM BY PATHOLOGIST: CPT | Mod: 26 | Performed by: SURGERY

## 2020-07-14 PROCEDURE — 71000012 ZZH RECOVERY PHASE 1 LEVEL 1 FIRST HR: Performed by: SURGERY

## 2020-07-14 PROCEDURE — C1781 MESH (IMPLANTABLE): HCPCS | Performed by: SURGERY

## 2020-07-14 PROCEDURE — 36000052 ZZH SURGERY LEVEL 2 EA 15 ADDTL MIN: Performed by: SURGERY

## 2020-07-14 PROCEDURE — 25800030 ZZH RX IP 258 OP 636: Performed by: ANESTHESIOLOGY

## 2020-07-14 PROCEDURE — 37000009 ZZH ANESTHESIA TECHNICAL FEE, EACH ADDTL 15 MIN: Performed by: SURGERY

## 2020-07-14 PROCEDURE — 49505 PRP I/HERN INIT REDUC >5 YR: CPT | Performed by: SURGERY

## 2020-07-14 PROCEDURE — 37000008 ZZH ANESTHESIA TECHNICAL FEE, 1ST 30 MIN: Performed by: SURGERY

## 2020-07-14 PROCEDURE — 49505 PRP I/HERN INIT REDUC >5 YR: CPT | Mod: RT | Performed by: PHYSICIAN ASSISTANT

## 2020-07-14 PROCEDURE — 40000306 ZZH STATISTIC PRE PROC ASSESS II: Performed by: SURGERY

## 2020-07-14 PROCEDURE — 25000125 ZZHC RX 250

## 2020-07-14 PROCEDURE — 36000050 ZZH SURGERY LEVEL 2 1ST 30 MIN: Performed by: SURGERY

## 2020-07-14 PROCEDURE — 88304 TISSUE EXAM BY PATHOLOGIST: CPT | Performed by: SURGERY

## 2020-07-14 PROCEDURE — 11401 EXC TR-EXT B9+MARG 0.6-1 CM: CPT | Mod: 59 | Performed by: SURGERY

## 2020-07-14 PROCEDURE — 25000128 H RX IP 250 OP 636

## 2020-07-14 DEVICE — MESH ULTRAPRO 03X06": Type: IMPLANTABLE DEVICE | Site: INGUINAL | Status: FUNCTIONAL

## 2020-07-14 RX ORDER — LIDOCAINE 40 MG/G
CREAM TOPICAL
Status: DISCONTINUED | OUTPATIENT
Start: 2020-07-14 | End: 2020-07-14 | Stop reason: HOSPADM

## 2020-07-14 RX ORDER — DEXAMETHASONE SODIUM PHOSPHATE 4 MG/ML
INJECTION, SOLUTION INTRA-ARTICULAR; INTRALESIONAL; INTRAMUSCULAR; INTRAVENOUS; SOFT TISSUE PRN
Status: DISCONTINUED | OUTPATIENT
Start: 2020-07-14 | End: 2020-07-14

## 2020-07-14 RX ORDER — ONDANSETRON 4 MG/1
4 TABLET, ORALLY DISINTEGRATING ORAL EVERY 30 MIN PRN
Status: DISCONTINUED | OUTPATIENT
Start: 2020-07-14 | End: 2020-07-14 | Stop reason: HOSPADM

## 2020-07-14 RX ORDER — LIDOCAINE HYDROCHLORIDE 10 MG/ML
INJECTION, SOLUTION INFILTRATION; PERINEURAL PRN
Status: DISCONTINUED | OUTPATIENT
Start: 2020-07-14 | End: 2020-07-14

## 2020-07-14 RX ORDER — CEFAZOLIN SODIUM 1 G/3ML
1 INJECTION, POWDER, FOR SOLUTION INTRAMUSCULAR; INTRAVENOUS SEE ADMIN INSTRUCTIONS
Status: DISCONTINUED | OUTPATIENT
Start: 2020-07-14 | End: 2020-07-14 | Stop reason: HOSPADM

## 2020-07-14 RX ORDER — CEFAZOLIN SODIUM 2 G/100ML
2 INJECTION, SOLUTION INTRAVENOUS
Status: COMPLETED | OUTPATIENT
Start: 2020-07-14 | End: 2020-07-14

## 2020-07-14 RX ORDER — TAMSULOSIN HYDROCHLORIDE 0.4 MG/1
0.4 CAPSULE ORAL
Status: DISCONTINUED | OUTPATIENT
Start: 2020-07-14 | End: 2020-07-14 | Stop reason: HOSPADM

## 2020-07-14 RX ORDER — NEOSTIGMINE METHYLSULFATE 1 MG/ML
VIAL (ML) INJECTION PRN
Status: DISCONTINUED | OUTPATIENT
Start: 2020-07-14 | End: 2020-07-14

## 2020-07-14 RX ORDER — OXYCODONE HYDROCHLORIDE 5 MG/1
5-10 TABLET ORAL EVERY 4 HOURS PRN
Qty: 20 TABLET | Refills: 0 | Status: SHIPPED | OUTPATIENT
Start: 2020-07-14 | End: 2021-02-19

## 2020-07-14 RX ORDER — OXYCODONE HYDROCHLORIDE 5 MG/1
5 TABLET ORAL
Status: COMPLETED | OUTPATIENT
Start: 2020-07-14 | End: 2020-07-14

## 2020-07-14 RX ORDER — NALOXONE HYDROCHLORIDE 0.4 MG/ML
.1-.4 INJECTION, SOLUTION INTRAMUSCULAR; INTRAVENOUS; SUBCUTANEOUS
Status: DISCONTINUED | OUTPATIENT
Start: 2020-07-14 | End: 2020-07-14 | Stop reason: HOSPADM

## 2020-07-14 RX ORDER — KETOROLAC TROMETHAMINE 30 MG/ML
INJECTION, SOLUTION INTRAMUSCULAR; INTRAVENOUS PRN
Status: DISCONTINUED | OUTPATIENT
Start: 2020-07-14 | End: 2020-07-14

## 2020-07-14 RX ORDER — MAGNESIUM HYDROXIDE 1200 MG/15ML
LIQUID ORAL PRN
Status: DISCONTINUED | OUTPATIENT
Start: 2020-07-14 | End: 2020-07-14 | Stop reason: HOSPADM

## 2020-07-14 RX ORDER — FENTANYL CITRATE 50 UG/ML
25-50 INJECTION, SOLUTION INTRAMUSCULAR; INTRAVENOUS
Status: DISCONTINUED | OUTPATIENT
Start: 2020-07-14 | End: 2020-07-14 | Stop reason: HOSPADM

## 2020-07-14 RX ORDER — PROPOFOL 10 MG/ML
INJECTION, EMULSION INTRAVENOUS PRN
Status: DISCONTINUED | OUTPATIENT
Start: 2020-07-14 | End: 2020-07-14

## 2020-07-14 RX ORDER — FENTANYL CITRATE 50 UG/ML
INJECTION, SOLUTION INTRAMUSCULAR; INTRAVENOUS PRN
Status: DISCONTINUED | OUTPATIENT
Start: 2020-07-14 | End: 2020-07-14

## 2020-07-14 RX ORDER — HYDROMORPHONE HYDROCHLORIDE 1 MG/ML
.3-.5 INJECTION, SOLUTION INTRAMUSCULAR; INTRAVENOUS; SUBCUTANEOUS EVERY 10 MIN PRN
Status: DISCONTINUED | OUTPATIENT
Start: 2020-07-14 | End: 2020-07-14 | Stop reason: HOSPADM

## 2020-07-14 RX ORDER — SODIUM CHLORIDE, SODIUM LACTATE, POTASSIUM CHLORIDE, CALCIUM CHLORIDE 600; 310; 30; 20 MG/100ML; MG/100ML; MG/100ML; MG/100ML
INJECTION, SOLUTION INTRAVENOUS CONTINUOUS
Status: DISCONTINUED | OUTPATIENT
Start: 2020-07-14 | End: 2020-07-14 | Stop reason: HOSPADM

## 2020-07-14 RX ORDER — MEPERIDINE HYDROCHLORIDE 25 MG/ML
12.5 INJECTION INTRAMUSCULAR; INTRAVENOUS; SUBCUTANEOUS
Status: DISCONTINUED | OUTPATIENT
Start: 2020-07-14 | End: 2020-07-14 | Stop reason: HOSPADM

## 2020-07-14 RX ORDER — BUPIVACAINE HYDROCHLORIDE 5 MG/ML
INJECTION, SOLUTION EPIDURAL; INTRACAUDAL PRN
Status: DISCONTINUED | OUTPATIENT
Start: 2020-07-14 | End: 2020-07-14 | Stop reason: HOSPADM

## 2020-07-14 RX ORDER — ONDANSETRON 2 MG/ML
4 INJECTION INTRAMUSCULAR; INTRAVENOUS EVERY 30 MIN PRN
Status: DISCONTINUED | OUTPATIENT
Start: 2020-07-14 | End: 2020-07-14 | Stop reason: HOSPADM

## 2020-07-14 RX ORDER — GLYCOPYRROLATE 0.2 MG/ML
INJECTION, SOLUTION INTRAMUSCULAR; INTRAVENOUS PRN
Status: DISCONTINUED | OUTPATIENT
Start: 2020-07-14 | End: 2020-07-14

## 2020-07-14 RX ORDER — ONDANSETRON 2 MG/ML
INJECTION INTRAMUSCULAR; INTRAVENOUS PRN
Status: DISCONTINUED | OUTPATIENT
Start: 2020-07-14 | End: 2020-07-14

## 2020-07-14 RX ADMIN — ROCURONIUM BROMIDE 10 MG: 10 INJECTION INTRAVENOUS at 09:57

## 2020-07-14 RX ADMIN — FENTANYL CITRATE 50 MCG: 50 INJECTION, SOLUTION INTRAMUSCULAR; INTRAVENOUS at 09:33

## 2020-07-14 RX ADMIN — BUPIVACAINE HYDROCHLORIDE 1 ML: 5 INJECTION, SOLUTION EPIDURAL; INTRACAUDAL at 09:35

## 2020-07-14 RX ADMIN — DEXAMETHASONE SODIUM PHOSPHATE 4 MG: 4 INJECTION, SOLUTION INTRA-ARTICULAR; INTRALESIONAL; INTRAMUSCULAR; INTRAVENOUS; SOFT TISSUE at 09:16

## 2020-07-14 RX ADMIN — FENTANYL CITRATE 100 MCG: 50 INJECTION, SOLUTION INTRAMUSCULAR; INTRAVENOUS at 09:16

## 2020-07-14 RX ADMIN — FENTANYL CITRATE 50 MCG: 50 INJECTION, SOLUTION INTRAMUSCULAR; INTRAVENOUS at 09:25

## 2020-07-14 RX ADMIN — ROCURONIUM BROMIDE 5 MG: 10 INJECTION INTRAVENOUS at 10:20

## 2020-07-14 RX ADMIN — GLYCOPYRROLATE 0.2 MG: 0.2 INJECTION, SOLUTION INTRAMUSCULAR; INTRAVENOUS at 09:16

## 2020-07-14 RX ADMIN — MIDAZOLAM 2 MG: 1 INJECTION INTRAMUSCULAR; INTRAVENOUS at 09:04

## 2020-07-14 RX ADMIN — Medication 5 MG: at 10:42

## 2020-07-14 RX ADMIN — SODIUM CHLORIDE, POTASSIUM CHLORIDE, SODIUM LACTATE AND CALCIUM CHLORIDE: 600; 310; 30; 20 INJECTION, SOLUTION INTRAVENOUS at 08:35

## 2020-07-14 RX ADMIN — FENTANYL CITRATE 50 MCG: 50 INJECTION, SOLUTION INTRAMUSCULAR; INTRAVENOUS at 09:55

## 2020-07-14 RX ADMIN — SODIUM CHLORIDE, POTASSIUM CHLORIDE, SODIUM LACTATE AND CALCIUM CHLORIDE: 600; 310; 30; 20 INJECTION, SOLUTION INTRAVENOUS at 10:22

## 2020-07-14 RX ADMIN — GLYCOPYRROLATE 0.7 MG: 0.2 INJECTION, SOLUTION INTRAMUSCULAR; INTRAVENOUS at 10:42

## 2020-07-14 RX ADMIN — CEFAZOLIN SODIUM 2 G: 2 INJECTION, SOLUTION INTRAVENOUS at 09:05

## 2020-07-14 RX ADMIN — ONDANSETRON HYDROCHLORIDE 4 MG: 2 INJECTION, SOLUTION INTRAVENOUS at 10:00

## 2020-07-14 RX ADMIN — KETOROLAC TROMETHAMINE 30 MG: 30 INJECTION, SOLUTION INTRAMUSCULAR at 10:00

## 2020-07-14 RX ADMIN — HYDROCODONE BITARTRATE AND ACETAMINOPHEN 100 ML: 500; 5 TABLET ORAL at 10:30

## 2020-07-14 RX ADMIN — BUPIVACAINE HYDROCHLORIDE 9 ML: 5 INJECTION, SOLUTION EPIDURAL; INTRACAUDAL at 10:41

## 2020-07-14 RX ADMIN — OXYCODONE HYDROCHLORIDE 5 MG: 5 TABLET ORAL at 11:50

## 2020-07-14 RX ADMIN — PROPOFOL 200 MG: 10 INJECTION, EMULSION INTRAVENOUS at 09:16

## 2020-07-14 RX ADMIN — LIDOCAINE HYDROCHLORIDE 50 MG: 10 INJECTION, SOLUTION INFILTRATION; PERINEURAL at 09:16

## 2020-07-14 RX ADMIN — ROCURONIUM BROMIDE 35 MG: 10 INJECTION INTRAVENOUS at 09:16

## 2020-07-14 ASSESSMENT — LIFESTYLE VARIABLES: TOBACCO_USE: 0

## 2020-07-14 ASSESSMENT — ENCOUNTER SYMPTOMS
DYSRHYTHMIAS: 0
SEIZURES: 0

## 2020-07-14 ASSESSMENT — COPD QUESTIONNAIRES: COPD: 0

## 2020-07-14 ASSESSMENT — MIFFLIN-ST. JEOR: SCORE: 1627.28

## 2020-07-14 NOTE — ANESTHESIA PREPROCEDURE EVALUATION
Anesthesia Pre-Procedure Evaluation    Patient: Yogi Moreland   MRN: [id number] : 1960          Preoperative Diagnosis: Right inguinal hernia [K40.90]    Procedure(s):  OPEN RIGHT INGUINAL HERNIA REPAIR WITH MESH; EXCISION BACK CYST    Past Medical History:   Diagnosis Date     Colon polyp      Colonic diverticular abscess      Dermatitis 2019     Elevated LFTs 2016     Elevated PSA      Enlarged prostate      Herpes zoster without complication 3/5/2019     History of prostate cancer 2017     Hyperlipidemia      Neuropathy 2019     Prostate cancer (H)      S/P colostomy takedown 2016     Sebaceous cyst 2019     Past Surgical History:   Procedure Laterality Date     APPENDECTOMY OPEN N/A 2016    Procedure: APPENDECTOMY OPEN;  Surgeon: Ramón Masters MD;  Location: [facility]     COLECTOMY WITH COLOSTOMY, COMBINED N/A 2016    Procedure: COMBINED COLECTOMY WITH COLOSTOMY;  Surgeon: Ramón Masters MD;  Location: [facility]     COLONOSCOPY       COLONOSCOPY  2013     COLONOSCOPY N/A 3/22/2018    Procedure: COLONOSCOPY;  COLONOSCOPY;  Surgeon: Chris Barrios MD;  Location: [facility]     HERNIORRHAPHY INGUINAL Left 12/15/2017    Procedure: HERNIORRHAPHY INGUINAL;  Left Inguinal Herniorrhaphy with Mesh;  Surgeon: Ramón Masters MD;  Location: [facility]     LAPAROSCOPIC ASSISTED COLOSTOMY TAKEDOWN N/A 2016    Procedure: LAPAROSCOPIC ASSISTED COLOSTOMY TAKEDOWN;  Surgeon: Ramón Masters MD;  Location: [facility]     LAPAROTOMY EXPLORATORY N/A 2016    Procedure: LAPAROTOMY EXPLORATORY;  Surgeon: Ramón Masters MD;  Location: [facility]     PROSTATECTOMY RETROPUBIC RADICAL N/A 2017    Procedure: PROSTATECTOMY RETROPUBIC RADICAL;  Retropubic Radical Prostectectomy, Bilateral pelvic Lymph Node Dissection ;  Surgeon: Chuy Reddy MD;  Location: [facility]     Anesthesia Evaluation     .             ROS/MED HX    ENT/Pulmonary:      (-) tobacco use, asthma, COPD,  sleep apnea and Other pulmonary disease   Neurologic:     (+)neuropathy    (-) seizures, CVA, TIA, Parkinson's disease and migraines   Cardiovascular:     (+) Dyslipidemia, ----. : . . . :. .      (-) hypertension, CAD, CHF, arrhythmias and pulmonary hypertension   METS/Exercise Tolerance:     Hematologic:        (-) anemia   Musculoskeletal:  - neg musculoskeletal ROS       GI/Hepatic:        (-) GERD and hepatitis   Renal/Genitourinary:      (-) renal disease   Endo:      (-) Type I DM, Type II DM, thyroid disease, chronic steroid usage, other endocrine disorder and obesity   Psychiatric:        (-) psychiatric history   Infectious Disease:  - neg infectious disease ROS       Malignancy:   (+) Malignancy History of Prostate          Other:                          Physical Exam      Airway   Mallampati: II  TM distance: >3 FB  Neck ROM: full    Dental     Cardiovascular   Rhythm and rate: regular and normal  (-) no murmur    Pulmonary    breath sounds clear to auscultation    Other findings: Lab Test        06/26/20 12/12/17 07/12/17 07/11/17      --          09/04/16                       1331          1040          0741          1625           --           2240          WBC          7.5           --          9.2          14.4*          < >        8.9           HGB          15.5         15.9         12.5*        13.6           < >        14.0          MCV          92            --          96           94             < >        91            PLT          218           --          205          230            < >        333           INR           --           --           --           --           --          1.01           < > = values in this interval not displayed.                  Lab Test        06/26/20 11/22/17 07/12/17                       1331          0953          0741          NA           137          139          140           POTASSIUM    4.0          5.1          4.1          "  CHLORIDE     107          105          105           CO2          26           24           29            BUN          27           27           19            CR           0.91         0.86         0.80          ANIONGAP     4            10           6             HA          9.1          9.5          8.3*          GLC          150*         98           107*                Lab Results   Component Value Date    WBC 7.5 06/26/2020    HGB 15.5 06/26/2020    HCT 46.4 06/26/2020     06/26/2020    CRP <2.9 11/02/2016    SED 11 09/02/2016     06/26/2020    POTASSIUM 4.0 06/26/2020    CHLORIDE 107 06/26/2020    CO2 26 06/26/2020    BUN 27 06/26/2020    CR 0.91 06/26/2020     (H) 06/26/2020    HA 9.1 06/26/2020    PHOS 2.9 09/04/2016    MAG 2.1 09/04/2016    ALBUMIN 4.5 11/22/2017    PROTTOTAL 7.5 11/22/2017    ALT 29 11/22/2017    AST 24 11/22/2017    ALKPHOS 60 11/22/2017    BILITOTAL 1.0 11/22/2017    LIPASE 199 09/26/2016    PTT 29 09/04/2016    INR 1.01 09/04/2016    TSH 2.31 11/02/2016    T4 1.17 09/04/2016       Preop Vitals  BP Readings from Last 3 Encounters:   07/14/20 137/85   06/26/20 104/66   06/22/20 115/72    Pulse Readings from Last 3 Encounters:   06/26/20 86   06/22/20 77   03/03/20 80      Resp Readings from Last 3 Encounters:   07/14/20 18   06/26/20 20   06/22/20 16    SpO2 Readings from Last 3 Encounters:   07/14/20 98%   06/26/20 96%   06/22/20 96%      Temp Readings from Last 1 Encounters:   07/14/20 96.8  F (36  C) (Temporal)    Ht Readings from Last 1 Encounters:   07/14/20 1.803 m (5' 11\")      Wt Readings from Last 1 Encounters:   07/14/20 80 kg (176 lb 6.4 oz)    Estimated body mass index is 24.6 kg/m  as calculated from the following:    Height as of this encounter: 1.803 m (5' 11\").    Weight as of this encounter: 80 kg (176 lb 6.4 oz).       Anesthesia Plan      History & Physical Review  History and physical reviewed and following examination; no interval " change.    ASA Status:  2 .    NPO Status:  > 8 hours    Plan for General with Propofol induction. Maintenance will be Balanced.    PONV prophylaxis:  Ondansetron (or other 5HT-3)         Postoperative Care  Postoperative pain management:  IV analgesics and Oral pain medications.      Consents  Anesthetic plan, risks, benefits and alternatives discussed with:  Patient..                 Robbie Beaulieu MD                    .

## 2020-07-14 NOTE — TELEPHONE ENCOUNTER
Procedure:  Open right inguinal hernia repair with mesh, excision of back cyst    Date:  07/14/2020    Surgeon: Sonam    Patient's wife calling for clarification on pain medication regimen.    She is wondering if he should be taking tylenol and motrin only if he is not utilizing oxy.    Informed her that he can take all three. Recommended he alternate to stay on top of the pain.    Starting tomorrow, he can try to utilize tylenol and ibuprofen and then take the oxy for any breakthrough pain that tylenol and ibuprofen do not relieve.    She verbalized understanding and they will call PRN.    Shilpa Sorto, RN-BSN

## 2020-07-14 NOTE — DISCHARGE INSTRUCTIONS
Oxycodone 5 mg was given at 11:50 am, it can be taken again at 4:00 pm again for moderate to severe pain.    You received Toradol, an IV form of ibuprofen (Motrin) at 10:00 am.  Do not take any ibuprofen products until 4:00 pm.    Maximum acetaminophen (Tylenol) dose from all sources should not exceed 4 grams (4000 mg) per day.  Take 975 mg when you get home and every 6 hours.                HOME CARE FOLLOWING INGUINAL HERNIA REPAIR  RODRIGO Garcia, SMITA Dubose, JAKE Suero    DIET:  Start with liquids and gradually resume your regular diet as tolerated.  Drink plenty of fluids.  While taking pain medications, consider use of a stool softener, increase your fiber in your diet, or add a fiber supplement (like Metamucil, Citrucel) to help prevent constipation - a possible side effect of pain medications.    NAUSEA:  If nauseated from the anesthetic/pain meds; rest in bed, get up cautiously with assistance, and drink clear liquids (juice, tea, broth).    ACTIVITY:  Light Activity -- you may immediately be up and about as tolerated.  Walking is encouraged, increase as tolerated.  Driving/Light Work-- when comfortable and off narcotic pain medications.  Strenuous Work/Activity -- limit lifting to 25 pounds for 3 weeks.  Active Sports (running, biking, etc.) -- cautiously resume after 2 weeks.    INCISIONAL CARE:    If you have a dressing in place, keep clean and dry for 48 hours; you may replace the gauze if it becomes soiled.    After 48 hours you may remove the dressing and shower.  Do not submerse incision in water for 1 week.    If you have a Dermabond dressing (a type of skin glue), you may shower immediately.    Sutures will absorb and need not be removed.    If present, leave the steri-strips (white paper tapes) in place for 14 days after surgery.    If present, leave Dermabond glue in place until it wears/flakes off.    Do not apply lotions, creams, or ointments to  incisions.    Expect a variable amount of swelling/black and blue discoloration that may involve the penis/scrotum or labia.    Some numbness around the incision is common.    A lump/ridge under the incision is normal and will gradually resolve.    DISCOMFORT:  Local anesthetic placed at surgery should provide relief for 4-8 hours.  Begin taking pain pills before discomfort is severe.  Take the pain medication with some food, when possible, to minimize side effects.  Intermittent use of ice packs to the hernia repair site may help during the first 1-3 weeks after surgery.  Expect gradual improvement.    Over-the-counter anti-inflammatory medications (i.e. Ibuprofen/Advil/Motrin or Naprosyn/Aleve) may be used per package instructions in addition to or while tapering off the narcotic pain medications to decrease swelling and sensitivity at the repair site.  DO NOT TAKE these Anti-inflammatory medications if your primary physician has advised against doing so, or if you have acid reflux, ulcer, or bleeding disorder, or take blood-thinner medications.  Call your primary physician or the surgery office if you have medication questions.    FOLLOW-UP AFTER SURGERY:  -Our office will contact you approximately 2-3 weeks after surgery to check on your progress and answer any questions you may have.  If you are doing well, you will not need to return for an office appointment.  If any concerns are identified over the phone, we will help you make an appointment to see a provider.    -If you have not received a phone call, have any questions or concerns, or would like to be seen, please call us at [phone].  We are located at: [address], [address]; [place] [address]    -CONTACT US IF THE FOLLOWING DEVELOPS:   1. A fever that is above 101     2. Increased redness, warmth, drainage, bleeding, or swelling.   3. Pain that is not relieved by rest/ice and your prescription.   4.  Increasing pain after 48  hours.   5. Drainage that is thick, cloudy, yellow, green or white.   6. Any other questions or concerns.      FREQUENTLY ASKED QUESTIONS:    Q:  How should my incision look?    A:  Normally your incision will appear slightly swollen with light redness directly along the incision itself as it heals.  It may feel like a bump or ridge as the healing/scarring happens, and over time (3-4 months) this bump or ridge feeling should slowly go away.  In general, clear or pink watery drainage can be normal at first as your incision heals, but should decrease over time.    Q:  How do I know if my incision is infected?  A:  Look at your incision for signs of infection, like redness around the incision spreading to surrounding skin, or drainage of cloudy or foul-smelling drainage.  If you feel warm, check your temperature to see if you are running a fever.    **If any of these things occur, please notify the nurse at our office.  We may need you to come into the office for an incision check.      Q:  How do I take care of my incision?  A:  If you have a dressing in place - Starting the day after surgery, replace the dressing 1-2 times a day until there is no further drainage from the incision.  At that time, a dressing is no longer needed.  Try to minimize tape on the skin if irritation is occurring at the tape sites.  If you have significant irritation from tape on the skin, please call the office to discuss other method of dressing your incision.    Small pieces of tape called  steri-strips  may be present directly overlying your incision; these may be removed 10 days after surgery unless otherwise specified by your surgeon.  If these tapes start to loosen at the ends, you may trim them back until they fall off or are removed.    A:  If you had  Dermabond  tissue glue used as a dressing (this causes your incision to look shiny with a clear covering over it) - This type of dressing wears off with time and does not require more  dressings over the top unless it is draining around the glue as it wears off.  Do not apply ointments or lotions over the incisions until the glue has completely worn off.    Q:  There is a piece of tape or a sticky  lead  still on my skin.  Can I remove this?  A:  Sometimes the sticky  leads  used for monitoring during surgery or for evaluation in the emergency department are not all removed while you are in the hospital.  These sometimes have a tab or metal dot on them.  You can easily remove these on your own, like taking off a band-aid.  If there is a gel substance under the  lead , simply wipe/clean it off with a washcloth or paper towel.      Q:  What can I do to minimize constipation (very hard stools, or lack of stools)?  A:  Stay well hydrated.  Increase your dietary fiber intake or take a fiber supplement -with plenty of water.  Walk around frequently.  You may consider an over-the-counter stool-softener.  Your Pharmacist can assist you with choosing one that is stocked at your pharmacy.  Constipation is also one of the most common side effects of pain medication.  If you are using pain medication, be pro-active and try to PREVENT problems with constipation by taking the steps above BEFORE constipation becomes a problem.    Q:  What do I do if I need more pain medications?  A:  Call the office to receive refills.  Be aware that certain pain meds cannot be called into a pharmacy and actually require a paper prescription.  A change may be made in your pain med as you progress thru your recovery period or if you have side effects to certain meds.    --Pain meds are NOT refilled after 5pm on weekdays, and NOT AT ALL on the weekends, so please look ahead to prevent problems.    Q:  Why am I having a hard time sleeping now that I am at home?  A:  Many medications you receive while you are in the hospital can impact your sleep for a number of days after your surgery/hospitalization.  Decreased level of activity  and naps during the day may also make sleeping at night difficult.  Try to minimize day-time naps, and get up frequently during the day to walk around your home during your recovery time.  Sleep aides may be of some help, but are not recommended for long-term use.      Q:  I am having some back discomfort.  What should I do?  A:  This may be related to certain positioning that was required for your surgery, extended periods of time in bed, or other changes in your overall activity level.  You may try ice, heat, acetaminophen, or ibuprofen to treat this temporarily.  Note that many pain medications have acetaminophen in them and would state this on the prescription bottle.  Be sure not to exceed the maximum of 4000mg per day of acetaminophen.     **If the pain you are having does not resolve, is severe, or is a flare of back pain you have had on other occasions prior to surgery, please contact your primary physician for further recommendations or for an appointment to be examined at their office.    Q:  Why am I having headaches?  A:  Headaches can be caused by many things:  caffeine withdrawal, use of pain meds, dehydration, high blood pressure, lack of sleep, over-activity/exhaustion, flare-up of usual migraine headaches.  If you feel this is related to muscle tension (a band-like feeling around the head, or a pressure at the low-back of the head) you may try ice or heat to this area.  You may need to drink more fluids (try electrolyte drink like Gatorade), rest, or take your usual migraine medications.   **If your headaches do not resolve, worsen, are accompanied by other symptoms, or if your blood pressure is high, please call your primary physician for recommendation and/or examination.    Q:  I am unable to urinate.  What do I do?  A:  A small percentage of people can have difficulty urinating initially after surgery.  This includes being able to urinate only a very small amount at a time and feeling discomfort  or pressure in the very low abdomen.  This is called  urinary retention , and is actually an urgent situation.  Proceed to your nearest Emergency department for evaluation (not an Urgent Care Center).  Sometimes the bladder does not work correctly after certain medications you receive during surgery, or related to certain procedures.  You may need to have a catheter placed until your bladder recovers.  When planning to go to an Emergency department, it may help to call the ER to let them know you are coming in for this problem after a surgery.  This may help you get in quicker to be evaluated.  **If you have symptoms of a urinary tract infection, please contact your primary physician for the proper evaluation and treatment.        If you have other questions, please call the office Monday thru Friday between 8am and 5pm to discuss with the nurse or physician assistant.  #[phone]    There is a surgeon ON CALL on weekday evenings and over the weekend in case of urgent need only, and may be contacted at the same number.    If you are having an emergency, call 911 or proceed to your nearest emergency department.    GENERAL ANESTHESIA OR SEDATION ADULT DISCHARGE INSTRUCTIONS   SPECIAL PRECAUTIONS FOR 24 HOURS AFTER SURGERY    IT IS NOT UNUSUAL TO FEEL LIGHT-HEADED OR FAINT, UP TO 24 HOURS AFTER SURGERY OR WHILE TAKING PAIN MEDICATION.  IF YOU HAVE THESE SYMPTOMS; SIT FOR A FEW MINUTES BEFORE STANDING AND HAVE SOMEONE ASSIST YOU WHEN YOU GET UP TO WALK OR USE THE BATHROOM.    YOU SHOULD REST AND RELAX FOR THE NEXT 24 HOURS AND YOU MUST MAKE ARRANGEMENTS TO HAVE SOMEONE STAY WITH YOU FOR AT LEAST 24 HOURS AFTER YOUR DISCHARGE.  AVOID HAZARDOUS AND STRENUOUS ACTIVITIES.  DO NOT MAKE IMPORTANT DECISIONS FOR 24 HOURS.    DO NOT DRIVE ANY VEHICLE OR OPERATE MECHANICAL EQUIPMENT FOR 24 HOURS FOLLOWING THE END OF YOUR SURGERY.  EVEN THOUGH YOU MAY FEEL NORMAL, YOUR REACTIONS MAY BE AFFECTED BY THE MEDICATION YOU HAVE  RECEIVED.    DO NOT DRINK ALCOHOLIC BEVERAGES FOR 24 HOURS FOLLOWING YOUR SURGERY.    DRINK CLEAR LIQUIDS (APPLE JUICE, GINGER ALE, 7-UP, BROTH, ETC.).  PROGRESS TO YOUR REGULAR DIET AS YOU FEEL ABLE.    YOU MAY HAVE A DRY MOUTH, A SORE THROAT, MUSCLES ACHES OR TROUBLE SLEEPING.  THESE SHOULD GO AWAY AFTER 24 HOURS.    CALL YOUR DOCTOR FOR ANY OF THE FOLLOWING:  SIGNS OF INFECTION (FEVER, GROWING TENDERNESS AT THE SURGERY SITE, A LARGE AMOUNT OF DRAINAGE OR BLEEDING, SEVERE PAIN, FOUL-SMELLING DRAINAGE, REDNESS OR SWELLING.    IT HAS BEEN OVER 8 TO 10 HOURS SINCE SURGERY AND YOU ARE STILL NOT ABLE TO URINATE (PASS WATER).

## 2020-07-14 NOTE — ANESTHESIA CARE TRANSFER NOTE
Patient: Yogi Moreland    Procedure(s):  OPEN RIGHT INGUINAL HERNIA REPAIR WITH MESH  EXCISION BACK CYST    Diagnosis: Right inguinal hernia [K40.90]  Diagnosis Additional Information: No value filed.    Anesthesia Type:   General     Note:  Airway :Face Mask  Patient transferred to:PACU  Comments: Pt to PACU, VSS, report to RNHandoff Report: Identifed the Patient, Identified the Reponsible Provider, Reviewed the pertinent medical history, Discussed the surgical course, Reviewed Intra-OP anesthesia mangement and issues during anesthesia, Set expectations for post-procedure period and Allowed opportunity for questions and acknowledgement of understanding      Vitals: (Last set prior to Anesthesia Care Transfer)    CRNA VITALS  7/14/2020 1032 - 7/14/2020 1108      7/14/2020             Resp Rate (observed):  (!) 2                Electronically Signed By: RIGO Rm CRNA  July 14, 2020  11:08 AM

## 2020-07-14 NOTE — OP NOTE
General Surgery Operative Note    Pre-operative diagnosis:  Right inguinal hernia [K40.90], back cyst   Post-operative diagnosis:  Same   Procedure:  Right inguinal hernia repair with mesh, excision of back cyst (1.0 cm)   Surgeon: Ramón Masters MD   Assistant(s): Eduardo Nelson PA-C  - the PA's assistance was medically necessary in providing adequate exposure in the operating field, maintaining hemostasis, cuttting suture, clamping and ligating blood vessels, and visualization of the anatomic structures throughout the surgical procedure.   Anesthesia: General    Estimated blood loss: 5 cc's   Drains placed: None   Complications:  None   Findings:   Right inguinal hernia with direct and indirect components, each moderately sized.  1 cm sebaceous cyst of the mid upper back excised in its entirety.     Indications for operation: This is a 59-year-old gentleman with a history of multiple previous surgeries.  I had previously fix a left inguinal hernia using an open technique, given the patient's extensive abdominal surgery.  He has now developed a right inguinal hernia.  I recommended repair, and we discussed the procedure, along with its risks and complications, in detail.  The patient has agreed to proceed.  In addition, the patient noted a cyst in his mid upper back.  I recommended excision of that as well.    Details of the operation: After informed consent, the patient was taken to the operating room where he underwent satisfactory induction of general anesthesia.  The patient was turned left side down lateral and was sterilely prepped around the upper mid back cyst.  The area was infiltrated with 0.5% Marcaine and an elliptical skin incision was made.  Dissection was carried carefully down to the cyst wall and the cyst was then dissected out from surrounding tissue in its entirety utilizing sharp dissection.  Hemostasis was then assured using electrocautery.  The skin was closed using 3-0 Vicryl subdermal  sutures.  Skin glue was then placed.    The patient was now repositioned onto his back and the right inguinal region was sterilely prepped and draped.  An ilioinguinal nerve block was performed in the area of the incision was also infiltrated with 0.5% Marcaine.  Skin incision was made and dissection was carried down through the subcutaneous tissue using electrocautery.  The external oblique fascia was exposed and opened sharply to the external ring.  The cord structures were encircled at the level of the pubis and a Penrose drain was passed around them.  The cord was freed up to the level of the internal ring and careful dissection revealed a prominent sac within the cord structures.  This was dissected away from cord structures and the distal sac was amputated.  The proximal sac was suture-ligated and allowed to fall back into the abdomen.  A moderate sized direct hernia was also noted.  This area was imbricated using a running 2-0 Vicryl suture.  A piece of ultra Pro mesh was now brought into the field and cut to shape in the modified Catalino fashion.  This was sutured down over the pubis using a 2-0 PDS suture, which was run up along the shelving edge to the level of the internal ring was tied.  The tails of the mesh were passed around the spermatic cord and sutured to each other into the shelving edge, creating a new internal ring which was snug, but not tight.  The tails of the mesh were tucked up underneath the external oblique fascia and the medial mesh was tacked down using a couple of 2-0 PDS sutures.  The external oblique fascia was now closed using a running 2-0 Vicryl suture.  Further local anesthetic was injected and the subcutaneous tissues were now approximated using interrupted 3-0 Vicryl sutures.  Skin was closed using skin adhesive.    The patient tolerated the procedure well and was transferred to the recovery room in satisfactory condition.  Sponge and needle counts were correct at the  close of the case.    Specimens:   ID Type Source Tests Collected by Time Destination   A : UPPER BACK CYST Cyst Back SURGICAL PATHOLOGY EXAM Ramón Masters MD 7/14/2020  9:40 AM            Ramón Masters MD

## 2020-07-14 NOTE — ANESTHESIA POSTPROCEDURE EVALUATION
Patient: Yogi Moreland    Procedure(s):  OPEN RIGHT INGUINAL HERNIA REPAIR WITH MESH  EXCISION BACK CYST    Diagnosis:Right inguinal hernia [K40.90]  Diagnosis Additional Information: No value filed.    Anesthesia Type:  General    Note:  Anesthesia Post Evaluation    Patient location during evaluation: PACU  Patient participation: Able to fully participate in evaluation  Level of consciousness: awake  Pain management: adequate  Airway patency: patent  Cardiovascular status: acceptable  Respiratory status: acceptable  Hydration status: euvolemic  PONV: controlled     Anesthetic complications: None          Last vitals:  Vitals:    07/14/20 1204 07/14/20 1205 07/14/20 1251   BP:  135/85 121/77   Pulse:      Resp:  16 16   Temp: 97  F (36.1  C)  97.4  F (36.3  C)   SpO2: 98% 95% 95%         Electronically Signed By: Robbie Beaulieu MD  July 14, 2020  1:21 PM

## 2020-07-14 NOTE — TELEPHONE ENCOUNTER
Name of caller: spouse Karina   Reason for Call:  Clarification on how to take medication. Should he take tylenol and motrin instead of Oxy.     Surgeon:  Dr. Masters    Recent Surgery:  Yes.    If yes, when & what type:    Right inguinal hernia repair with mesh, excision of back cyst (1.0 cm 7/14/20     Best phone number to reach pt at is: [phone]   Ok to leave a message with medical info? Yes.    Pharmacy preferred (if calling for a refill): N/A

## 2020-07-15 LAB — COPATH REPORT: NORMAL

## 2020-07-27 ENCOUNTER — TELEPHONE (OUTPATIENT)
Dept: SURGERY | Facility: CLINIC | Age: 60
End: 2020-07-27

## 2020-07-27 NOTE — TELEPHONE ENCOUNTER
[facility] Postoperative Call Note:     Yogi Moreland was called for an update regarding his recovery. He underwent a right inguinal  hernia repair with mesh by Dr. Masters on 7/14/20. Today he tells me he is doing well. He does get some occasional sharp pain in his groin as expected. He has some bruising that is going away now. He is eating a normal diet and his bowels are regular. He states his wounds are healing well. He is adhering to his lifting restrictions.    Lifting restrictions were reviewed in detail. The patient states all of his questions were answered. He understands our discussion. He agrees to follow up as needed or to call our office with any concerns.    Eduardo Nelson PA-C      Please route or send letter to:  Primary Care Provider (PCP)

## 2020-07-27 NOTE — TELEPHONE ENCOUNTER
S/p Right inguinal hernia repair with mesh, excision of back cyst (1.0 cm)  Date: 7/14/20  Surgeon:  Dr. Masters    Patient is calling with questions re: post-op recovery.  He continues with some bruising in the R scrotum and R groin and the area remains tender and sore.    He is wondering about restrictions and return to activity.      He states that swelling has pretty much resolved and denies and redness or drainage from incision. States he took oxycodone for the first 2-3 days, has been taking ibuprofen since then - now as needed.    Reassured patient that bruising is expected after open repair of inguinal hernia.  Continue to ice bid, wear well-fitting boxer briefs, and ibuprofen as needed.      No strenuous activity and limit lifting to 25lbs or less for 3 weeks as per AVS. He can then gradually resume activities as tolerated.  We specifically discussed starting slow with activity and gradually increasing if he tolerates.     I informed patient that he will be getting a call from one of 's PA's within the next week to follow up with him post-op.     Pt verbalizes understanding and will call with any further questions or concerns.

## 2020-11-17 ENCOUNTER — TELEPHONE (OUTPATIENT)
Dept: UROLOGY | Facility: CLINIC | Age: 60
End: 2020-11-17

## 2020-11-17 DIAGNOSIS — N52.9 ED (ERECTILE DYSFUNCTION): Primary | ICD-10-CM

## 2020-11-17 NOTE — TELEPHONE ENCOUNTER
Called [facility] with refill as requested.   ISAMAR Abraham RN      [facility]    Phone Message    May a detailed message be left on voicemail: yes     Reason for Call: Medication Refill Request    Has the patient contacted the pharmacy for the refill? Yes   Name of medication being requested: Per call from PT was informed by [facility] that the COMPOUNDED NON-CONTROLLED SUBSTANCE (CMPD RX) - PHARMACY TO MIX COMPOUNDED MEDICATION has  and a REVEWAL is required. Pharmacy information is as above and please reach out to the PT if any questions.   Provider who prescribed the medication: Dr Reddy  Pharmacy: [facility]  [address] [place] [address]  Date medication is needed: ASAP         Action Taken: Message routed to:  Other: Urology - [place]    Travel Screening: Not Applicable

## 2020-12-31 NOTE — MR AVS SNAPSHOT
After Visit Summary   2/20/2018    Yogi Moreland    MRN: [id number]           Patient Information     Date Of Birth          1960        Visit Information        Provider Department      2/20/2018 9:40 AM Chuy Reddy MD [facility]        Today's Diagnoses     Malignant neoplasm of prostate (H)    -  1       Follow-ups after your visit        Your next 10 appointments already scheduled     May 24, 2018  9:50 AM CDT   Return Visit with Chuy Reddy MD   [facility] ([facility])    [address]  [address]  [facility] [id number] [phone]              Future tests that were ordered for you today     Open Future Orders        Priority Expected Expires Ordered    PSA tumor marker [[id number]] Routine 5/20/2018 2/20/2019 2/20/2018            Who to contact     If you have questions or need follow up information about today's clinic visit or your schedule please contact [facility] directly at [phone].  Normal or non-critical lab and imaging results will be communicated to you by [employer], letter or phone within 4 business days after the clinic has received the results. If you do not hear from us within 7 days, please contact the clinic through [employer] or phone. If you have a critical or abnormal lab result, we will notify you by phone as soon as possible.  Submit refill requests through [email or web address] or call your pharmacy and they will forward the refill request to us. Please allow 3 business days for your refill to be completed.          Additional Information About Your Visit        MyChart Information     [email or web address] gives you secure access to your electronic health record. If you see a primary care provider, you can also send messages to your care team and make appointments. If you have questions, please call your primary care  "clinic.  If you do not have a primary care provider, please call [phone] and they will assist you.        Care EveryWhere ID     This is your Care EveryWhere ID. This could be used by other organizations to access your [place] medical records  [id number]        Your Vitals Were     Pulse Height Pulse Oximetry BMI (Body Mass Index)          94 1.803 m (5' 11\") 96% 23.43 kg/m2         Blood Pressure from Last 3 Encounters:   12/26/17 142/60   12/15/17 137/81   12/12/17 122/74    Weight from Last 3 Encounters:   02/20/18 76.2 kg (168 lb)   12/26/17 76.2 kg (168 lb)   12/15/17 76.2 kg (168 lb)               Primary Care Provider Office Phone # Fax #    Randolph Solomon Mueller [phone] [address] [facility] [address]        Equal Access to Services     MAYDA TEJADA : Hadii aad ku hadasho Soomaali, waaxda luqadaha, qaybta kaalmada adeegyada, waxay idiin hayaan phil khhalima forbes . So [facility] [phone].    ATENCIÓN: Si habla español, tiene a solis disposición servicios gratuitos de asistencia lingüística. Elizabethame al [phone].    We comply with applicable federal civil rights laws and [place] laws. We do not discriminate on the basis of race, color, national origin, age, disability, sex, sexual orientation, or gender identity.            Thank you!     Thank you for choosing [facility] [place]  for your care. Our goal is always to provide you with excellent care. Hearing back from our patients is one way we can continue to improve our services. Please take a few minutes to complete the written survey that you may receive in the mail after your visit with us. Thank you!             Your Updated Medication List - Protect others around you: Learn how to safely use, store and throw away your medicines at www.disposemymeds.org.          This list is accurate as of 2/20/18 10:15 AM.  Always use your most recent med list.                   Brand Name " Dispense Instructions for use Diagnosis    ADVIL PO      Take 400 mg by mouth every 6 hours as needed for moderate pain        oxyCODONE IR 5 MG tablet    ROXICODONE    30 tablet    Take 1-2 tablets (5-10 mg) by mouth every 3 hours as needed for pain or other (Moderate to Severe)    Left inguinal hernia       * CIALIS PO           * tadalafil 5 MG tablet    CIALIS    30 tablet    Take 1 tablet (5 mg) by mouth daily Never use with nitroglycerin, terazosin or doxazosin.    Prostate cancer (H)       * tadalafil 5 MG tablet    CIALIS    30 tablet    Take 1 tablet (5 mg) by mouth daily as needed Do not use with nitroglycerin, terazosin or doxazosin.    ED (erectile dysfunction)       * Notice:  This list has 3 medication(s) that are the same as other medications prescribed for you. Read the directions carefully, and ask your doctor or other care provider to review them with you.       Unknown

## 2021-01-14 ENCOUNTER — HEALTH MAINTENANCE LETTER (OUTPATIENT)
Age: 61
End: 2021-01-14

## 2021-02-15 ASSESSMENT — ENCOUNTER SYMPTOMS
FREQUENCY: 0
MYALGIAS: 0
PALPITATIONS: 0
HEARTBURN: 0
HEMATOCHEZIA: 0
NAUSEA: 0
COUGH: 0
ARTHRALGIAS: 0
PARESTHESIAS: 0
FEVER: 0
DIARRHEA: 0
HEADACHES: 0
SHORTNESS OF BREATH: 0
DIZZINESS: 0
ABDOMINAL PAIN: 0
CONSTIPATION: 0
DYSURIA: 0
NERVOUS/ANXIOUS: 0
CHILLS: 0
HEMATURIA: 0
WEAKNESS: 0
EYE PAIN: 0
JOINT SWELLING: 0
SORE THROAT: 0

## 2021-02-18 ENCOUNTER — OFFICE VISIT (OUTPATIENT)
Dept: FAMILY MEDICINE | Facility: CLINIC | Age: 61
End: 2021-02-18
Payer: COMMERCIAL

## 2021-02-18 VITALS
HEIGHT: 71 IN | TEMPERATURE: 98.1 F | RESPIRATION RATE: 15 BRPM | DIASTOLIC BLOOD PRESSURE: 82 MMHG | SYSTOLIC BLOOD PRESSURE: 136 MMHG | OXYGEN SATURATION: 97 % | WEIGHT: 182.5 LBS | HEART RATE: 69 BPM | BODY MASS INDEX: 25.55 KG/M2

## 2021-02-18 DIAGNOSIS — C61 PROSTATE CANCER (H): ICD-10-CM

## 2021-02-18 DIAGNOSIS — Z00.00 ROUTINE GENERAL MEDICAL EXAMINATION AT A HEALTH CARE FACILITY: Primary | ICD-10-CM

## 2021-02-18 DIAGNOSIS — Z00.00 WELL ADULT EXAM: ICD-10-CM

## 2021-02-18 LAB
ALBUMIN SERPL-MCNC: 4.3 G/DL (ref 3.4–5)
ALP SERPL-CCNC: 65 U/L (ref 40–150)
ALT SERPL W P-5'-P-CCNC: 37 U/L (ref 0–70)
ANION GAP SERPL CALCULATED.3IONS-SCNC: 3 MMOL/L (ref 3–14)
AST SERPL W P-5'-P-CCNC: 22 U/L (ref 0–45)
BILIRUB SERPL-MCNC: 0.8 MG/DL (ref 0.2–1.3)
BUN SERPL-MCNC: 26 MG/DL (ref 7–30)
CALCIUM SERPL-MCNC: 9.8 MG/DL (ref 8.5–10.1)
CHLORIDE SERPL-SCNC: 105 MMOL/L (ref 94–109)
CHOLEST SERPL-MCNC: 272 MG/DL
CO2 SERPL-SCNC: 30 MMOL/L (ref 20–32)
CREAT SERPL-MCNC: 0.94 MG/DL (ref 0.66–1.25)
GFR SERPL CREATININE-BSD FRML MDRD: 86 ML/MIN/{1.73_M2}
GLUCOSE SERPL-MCNC: 103 MG/DL (ref 70–99)
HDLC SERPL-MCNC: 35 MG/DL
LDLC SERPL CALC-MCNC: 180 MG/DL
NONHDLC SERPL-MCNC: 237 MG/DL
POTASSIUM SERPL-SCNC: 5 MMOL/L (ref 3.4–5.3)
PROT SERPL-MCNC: 7.7 G/DL (ref 6.8–8.8)
SODIUM SERPL-SCNC: 138 MMOL/L (ref 133–144)
TRIGL SERPL-MCNC: 285 MG/DL

## 2021-02-18 PROCEDURE — 80061 LIPID PANEL: CPT | Performed by: FAMILY MEDICINE

## 2021-02-18 PROCEDURE — 80053 COMPREHEN METABOLIC PANEL: CPT | Performed by: FAMILY MEDICINE

## 2021-02-18 PROCEDURE — 99396 PREV VISIT EST AGE 40-64: CPT | Performed by: FAMILY MEDICINE

## 2021-02-18 PROCEDURE — 36415 COLL VENOUS BLD VENIPUNCTURE: CPT | Performed by: FAMILY MEDICINE

## 2021-02-18 PROCEDURE — 84153 ASSAY OF PSA TOTAL: CPT | Performed by: FAMILY MEDICINE

## 2021-02-18 ASSESSMENT — MIFFLIN-ST. JEOR: SCORE: 1649.94

## 2021-02-18 ASSESSMENT — ENCOUNTER SYMPTOMS
ABDOMINAL PAIN: 0
DIZZINESS: 0
PALPITATIONS: 0
NERVOUS/ANXIOUS: 0
PARESTHESIAS: 0
ARTHRALGIAS: 0
NAUSEA: 0
HEMATOCHEZIA: 0
WEAKNESS: 0
EYE PAIN: 0
JOINT SWELLING: 0
HEARTBURN: 0
DIARRHEA: 0
HEMATURIA: 0
SORE THROAT: 0
FREQUENCY: 0
CONSTIPATION: 0
FEVER: 0
MYALGIAS: 0
SHORTNESS OF BREATH: 0
HEADACHES: 0
DYSURIA: 0
COUGH: 0
CHILLS: 0

## 2021-02-18 NOTE — PROGRESS NOTES
SUBJECTIVE:   CC: Yogi Moreland is an 60 year old male who presents for preventative health visit.       Patient has been advised of split billing requirements and indicates understanding: Yes  Healthy Habits:     Getting at least 3 servings of Calcium per day:  Yes    Bi-annual eye exam:  NO    Dental care twice a year:  NO    Sleep apnea or symptoms of sleep apnea:  None    Diet:  Regular (no restrictions)    Frequency of exercise:  None    Taking medications regularly:  Yes    Medication side effects:  None    PHQ-2 Total Score: 0    Additional concerns today:  No              Today's PHQ-2 Score:   PHQ-2 ( 1999 Pfizer) 2/15/2021   Q1: Little interest or pleasure in doing things 0   Q2: Feeling down, depressed or hopeless 0   PHQ-2 Score 0   Q1: Little interest or pleasure in doing things Not at all   Q2: Feeling down, depressed or hopeless Not at all   PHQ-2 Score 0       Abuse: Current or Past(Physical, Sexual or Emotional)- No  Do you feel safe in your environment? Yes    Have you ever done Advance Care Planning? (For example, a Health Directive, POLST, or a discussion with a medical provider or your loved ones about your wishes): Yes, patient states has an Advance Care Planning document and will bring a copy to the clinic.    Social History     Tobacco Use     Smoking status: Never Smoker     Smokeless tobacco: Never Used   Substance Use Topics     Alcohol use: Yes     Alcohol/week: 0.0 standard drinks     Comment: 7-10 week         Alcohol Use 2/15/2021   Prescreen: >3 drinks/day or >7 drinks/week? No   Prescreen: >3 drinks/day or >7 drinks/week? -       Last PSA:   PSA   Date Value Ref Range Status   02/18/2020 0.03 0 - 4 ug/L Final     Comment:     Assay Method:  Chemiluminescence using Siemens Vista analyzer       Reviewed orders with patient. Reviewed health maintenance and updated orders accordingly - Yes  BP Readings from Last 3 Encounters:   02/18/21 136/82   07/14/20 121/77   06/26/20 104/66    Wt  Readings from Last 3 Encounters:   02/18/21 82.8 kg (182 lb 8 oz)   07/14/20 80 kg (176 lb 6.4 oz)   06/26/20 80.7 kg (178 lb)                  Past Medical History:   Diagnosis Date     Colon polyp      Colonic diverticular abscess      Dermatitis 2/12/2019     Elevated LFTs 8/2016     Elevated PSA      Enlarged prostate      Herpes zoster without complication 3/5/2019     History of prostate cancer 4/6/2017     Hyperlipidemia      Neuropathy 02/12/2019     Prostate cancer (H)      S/P colostomy takedown 12/27/2016     Sebaceous cyst 2/12/2019       Past Surgical History:   Procedure Laterality Date     APPENDECTOMY OPEN N/A 9/26/2016    Procedure: APPENDECTOMY OPEN;  Surgeon: Ramón Masters MD;  Location: [facility] OR     COLECTOMY WITH COLOSTOMY, COMBINED N/A 9/26/2016    Procedure: COMBINED COLECTOMY WITH COLOSTOMY;  Surgeon: Ramón Masters MD;  Location: [facility] OR     COLONOSCOPY  2009     COLONOSCOPY  2/2013     COLONOSCOPY N/A 3/22/2018    Procedure: COLONOSCOPY;  COLONOSCOPY;  Surgeon: Chris Barrios MD;  Location: [facility] GI     EXCISE MASS BACK N/A 7/14/2020    Procedure: EXCISION BACK CYST;  Surgeon: Ramón Masters MD;  Location: [facility] OR     HERNIORRHAPHY INGUINAL Left 12/15/2017    Procedure: HERNIORRHAPHY INGUINAL;  Left Inguinal Herniorrhaphy with Mesh;  Surgeon: Ramón Masters MD;  Location: [facility] OR     HERNIORRHAPHY INGUINAL Right 7/14/2020    Procedure: OPEN RIGHT INGUINAL HERNIA REPAIR WITH MESH;  Surgeon: Ramón Masters MD;  Location: [facility]     LAPAROSCOPIC ASSISTED COLOSTOMY TAKEDOWN N/A 12/27/2016    Procedure: LAPAROSCOPIC ASSISTED COLOSTOMY TAKEDOWN;  Surgeon: Ramón Masters MD;  Location: [facility]     LAPAROTOMY EXPLORATORY N/A 9/26/2016    Procedure: LAPAROTOMY EXPLORATORY;  Surgeon: Ramón Masters MD;  Location: [facility]     PROSTATECTOMY RETROPUBIC RADICAL N/A 7/11/2017    Procedure: PROSTATECTOMY RETROPUBIC RADICAL;  Retropubic Radical Prostectectomy, Bilateral pelvic Lymph Node  Dissection ;  Surgeon: Chuy Reddy MD;  Location: [facility] OR       Family History   Problem Relation Age of Onset     Cancer Mother         metastatic     Cancer Father         melanoma      Colon Cancer No family hx of        Social History     Tobacco Use     Smoking status: Never Smoker     Smokeless tobacco: Never Used   Substance Use Topics     Alcohol use: Yes     Alcohol/week: 0.0 standard drinks     Comment: 7-10 week         Reviewed and updated as needed this visit by clinical staff  Tobacco  Allergies               Reviewed and updated as needed this visit by Provider                    Review of Systems   Constitutional: Negative for chills and fever.   HENT: Negative for congestion, ear pain, hearing loss and sore throat.    Eyes: Negative for pain and visual disturbance.   Respiratory: Negative for cough and shortness of breath.    Cardiovascular: Negative for chest pain, palpitations and peripheral edema.   Gastrointestinal: Negative for abdominal pain, constipation, diarrhea, heartburn, hematochezia and nausea.   Genitourinary: Negative for discharge, dysuria, frequency, genital sores, hematuria, impotence and urgency.   Musculoskeletal: Negative for arthralgias, joint swelling and myalgias.   Skin: Negative for rash.   Neurological: Negative for dizziness, weakness, headaches and paresthesias.   Psychiatric/Behavioral: Negative for mood changes. The patient is not nervous/anxious.      CONSTITUTIONAL: NEGATIVE for fever, chills, change in weight  INTEGUMENTARY/SKIN: NEGATIVE for worrisome rashes, moles or lesions  EYES: NEGATIVE for vision changes or irritation  ENT: NEGATIVE for ear, mouth and throat problems  RESP: NEGATIVE for significant cough or SOB  CV: NEGATIVE for chest pain, palpitations or peripheral edema  GI: NEGATIVE for nausea, abdominal pain, heartburn, or change in bowel habits   male: negative for dysuria, hematuria, decreased urinary stream, erectile dysfunction, urethral  "discharge  MUSCULOSKELETAL: NEGATIVE for significant arthralgias or myalgia  NEURO: NEGATIVE for weakness, dizziness or paresthesias  PSYCHIATRIC: NEGATIVE for changes in mood or affect    OBJECTIVE:   /82 (BP Location: Right arm, Patient Position: Chair, Cuff Size: Adult Regular)   Pulse 69   Temp 98.1  F (36.7  C) (Oral)   Resp 15   Ht 1.803 m (5' 11\")   Wt 82.8 kg (182 lb 8 oz)   SpO2 97%   BMI 25.45 kg/m        Physical Exam  GENERAL: healthy, alert and no distress  EYES: Eyes grossly normal to inspection, PERRL and conjunctivae and sclerae normal  HENT: ear canals and TM's normal, nose and mouth without ulcers or lesions  NECK: no adenopathy, no asymmetry, masses, or scars and thyroid normal to palpation  RESP: lungs clear to auscultation - no rales, rhonchi or wheezes  CV: regular rate and rhythm, normal S1 S2, no S3 or S4, no murmur, click or rub, no peripheral edema and peripheral pulses strong  ABDOMEN: soft, nontender, no hepatosplenomegaly, no masses and bowel sounds normal  MS: no gross musculoskeletal defects noted, no edema  SKIN: no suspicious lesions or rashes  NEURO: Normal strength and tone, mentation intact and speech normal  PSYCH: mentation appears normal, affect normal/bright    Diagnostic Test Results:  Labs reviewed in Epic    ASSESSMENT/PLAN:   1. Routine general medical examination at a health care facility  He's a prostate cancer surviver     2. Prostate cancer (H)over five years . UMP    Near his ideal weight     Patient has been advised of split billing requirements and indicates understanding: Yes  COUNSELING:   Reviewed preventive health counseling, as reflected in patient instructions       Regular exercise       Healthy diet/nutrition    Estimated body mass index is 24.6 kg/m  as calculated from the following:    Height as of 7/14/20: 1.803 m (5' 11\").    Weight as of 7/14/20: 80 kg (176 lb 6.4 oz).     Weight management plan: Discussed healthy diet and exercise " guidelines    He reports that he has never smoked. He has never used smokeless tobacco.    HE HAS neuropathy in his feet   Counseling Resources:  ATP IV Guidelines  Pooled Cohorts Equation Calculator  FRAX Risk Assessment  ICSI Preventive Guidelines  Dietary Guidelines for Americans, 2010  USDA's MyPlate  ASA Prophylaxis  Lung CA Screening    Randolph Mueller MD  [facility]

## 2021-02-19 ENCOUNTER — MYC MEDICAL ADVICE (OUTPATIENT)
Dept: FAMILY MEDICINE | Facility: CLINIC | Age: 61
End: 2021-02-19

## 2021-02-19 LAB — PSA SERPL-MCNC: 0.04 UG/L (ref 0–4)

## 2021-02-23 ENCOUNTER — VIRTUAL VISIT (OUTPATIENT)
Dept: UROLOGY | Facility: CLINIC | Age: 61
End: 2021-02-23
Payer: COMMERCIAL

## 2021-02-23 VITALS — BODY MASS INDEX: 25.34 KG/M2 | WEIGHT: 181 LBS | HEIGHT: 71 IN

## 2021-02-23 DIAGNOSIS — C61 PROSTATE CANCER (H): Primary | ICD-10-CM

## 2021-02-23 PROCEDURE — 99212 OFFICE O/P EST SF 10 MIN: CPT | Mod: 95 | Performed by: UROLOGY

## 2021-02-23 ASSESSMENT — MIFFLIN-ST. JEOR: SCORE: 1643.14

## 2021-02-23 ASSESSMENT — PAIN SCALES - GENERAL: PAINLEVEL: NO PAIN (0)

## 2021-02-23 NOTE — LETTER
2/23/2021       RE: Yogi Moreland  [address]   [place] [id number]     Dear Colleague,    Thank you for referring your patient, Yogi Moreland, to the [facility] [place] at [facility]. Please see a copy of my visit note below.    Yogi is a 60 year old who is being evaluated via a billable telephone visit.      What phone number would you like to be contacted at? [phone]  How would you like to obtain your AVS? My Chart    Yogi is a 60-year-old male who underwent radical prostatectomy 3-1/2 years ago.  He was found to have grade 3+4 disease with negative surgical margins, negative seminal vesicles, no extracapsular tumor and negative lymph nodes.  His PSA is 0.04.  He is having no difficulties.  Past medical history, medications and allergies reviewed  Exam: Alert and oriented, no respiratory signs or admitted symptoms  Assessment: Grade 3+4 adenocarcinoma of the prostate.  Will need to follow PSA at 6month intervals for now.  Discussed defining biochemical recurrence as 0.2 or greater.        Phone call duration: 10 minutes with chart review    Again, thank you for allowing me to participate in the care of your patient.      Sincerely,    Chuy Reddy MD

## 2021-02-23 NOTE — PROGRESS NOTES
Yogi is a 60 year old who is being evaluated via a billable telephone visit.      What phone number would you like to be contacted at? [phone]  How would you like to obtain your AVS? My Chart    Yogi is a 60-year-old male who underwent radical prostatectomy 3-1/2 years ago.  He was found to have grade 3+4 disease with negative surgical margins, negative seminal vesicles, no extracapsular tumor and negative lymph nodes.  His PSA is 0.04.  He is having no difficulties.  Past medical history, medications and allergies reviewed  Exam: Alert and oriented, no respiratory signs or admitted symptoms  Assessment: Grade 3+4 adenocarcinoma of the prostate.  Will need to follow PSA at 6month intervals for now.  Discussed defining biochemical recurrence as 0.2 or greater.        Phone call duration: 10 minutes with chart review

## 2021-02-24 NOTE — NURSING NOTE
Here for [facility]  With MD. Having some low back pain and catheter is irritating him. Suri Santiago LPN   [Negative] : Gastrointestinal

## 2021-08-13 ENCOUNTER — LAB (OUTPATIENT)
Dept: LAB | Facility: CLINIC | Age: 61
End: 2021-08-13

## 2021-08-13 DIAGNOSIS — C61 PROSTATE CANCER (H): ICD-10-CM

## 2021-08-13 LAB — PSA SERPL-MCNC: 0.05 UG/L (ref 0–4)

## 2021-08-13 PROCEDURE — 36415 COLL VENOUS BLD VENIPUNCTURE: CPT

## 2021-08-13 PROCEDURE — 84153 ASSAY OF PSA TOTAL: CPT

## 2021-08-17 ENCOUNTER — OFFICE VISIT (OUTPATIENT)
Dept: UROLOGY | Facility: CLINIC | Age: 61
End: 2021-08-17
Payer: COMMERCIAL

## 2021-08-17 VITALS
SYSTOLIC BLOOD PRESSURE: 138 MMHG | WEIGHT: 176 LBS | HEIGHT: 71 IN | BODY MASS INDEX: 24.64 KG/M2 | DIASTOLIC BLOOD PRESSURE: 80 MMHG

## 2021-08-17 DIAGNOSIS — C61 PROSTATE CANCER (H): ICD-10-CM

## 2021-08-17 DIAGNOSIS — N52.31 ERECTILE DYSFUNCTION AFTER RADICAL PROSTATECTOMY: ICD-10-CM

## 2021-08-17 DIAGNOSIS — R97.21 BIOCHEMICALLY RECURRENT MALIGNANT NEOPLASM OF PROSTATE (H): Primary | ICD-10-CM

## 2021-08-17 DIAGNOSIS — C61 BIOCHEMICALLY RECURRENT MALIGNANT NEOPLASM OF PROSTATE (H): Primary | ICD-10-CM

## 2021-08-17 PROCEDURE — 99213 OFFICE O/P EST LOW 20 MIN: CPT | Performed by: STUDENT IN AN ORGANIZED HEALTH CARE EDUCATION/TRAINING PROGRAM

## 2021-08-17 ASSESSMENT — PAIN SCALES - GENERAL: PAINLEVEL: NO PAIN (0)

## 2021-08-17 ASSESSMENT — MIFFLIN-ST. JEOR: SCORE: 1615.46

## 2021-08-17 NOTE — LETTER
"8/17/2021       RE: Yogi Moreland  [address] [facility] [address]     Dear Colleague,    Thank you for referring your patient, Yogi Moreland, to the [facility] UROLOGY CLINIC [place] at [facility]. Please see a copy of my visit note below.    CHIEF COMPLAINT   Yogi Moreland who is a 61 year old male returns today for follow-up of prostate cancer s/p RRP + BPLND 7/11/2017 (iPSA 7.46 ng/ml, [place] 3+4=7 [id number], negative margins)    HPI   Yogi Moreland is a 61 year old male who presents with a history of prostate cancer s/p RRP + BPLND 7/11/2017 (iPSA 7.46 ng/ml, [place] 3+4=7 [id number], negative margins)    No issues with urination. Has ED but oral medications are working well (tadalafil yuly)    Slowly rising PSA, now 0.05 ng/ml    PHYSICAL EXAM  Patient is a 61 year old  male   Vitals: Height 1.803 m (5' 11\"), weight 79.8 kg (176 lb).  Body mass index is 24.55 kg/m .  General Appearance Adult:   Alert, no acute distress, oriented  HENT: throat/mouth:normal, good dentition  Lungs: no respiratory distress, or pursed lip breathing  Heart: No obvious jugular venous distension present  Abdomen: soft, nontender, no organomegaly or masses  Musculoskeltal: extremities normal, no peripheral edema  Skin: no suspicious lesions or rashes  Neuro: Alert, oriented, speech and mentation normal  Psych: affect and mood normal  Gait: Normal      Component PSA PSA Diag Urologic Phys   Latest Ref Rng & Units 0.00 - 4.00 ug/L 0.00 - 4.00 ng/mL   1/23/2013 5.14 (H)    9/2/2016 5.53 (H)    11/2/2016 7.46 (H)    1/23/2017 7.61 (H)    8/21/2017 0.01    11/28/2017 <0.01    2/15/2018 <0.01    5/17/2018 <0.01    10/25/2018 0.01    11/29/2018  <0.04   3/4/2019 0.02    9/17/2019 0.02    2/18/2020 0.03    2/18/2021 0.04    8/13/2021 0.05        ASSESSMENT and PLAN  61 year old male who presents with a history of prostate cancer s/p RRP + BPLND 7/11/2017 (iPSA 7.46 ng/ml, " [place] 3+4=7 [id number], negative margins). Has biochemical recurrence, PSA now 0.05 ng/ml. Discussed the slow rate of rise of PSA. Offered continued observation vs. Referral for consideration of salvage radiotherapy. Patient opts to talk to [facility]    Refilled tadalafil yuly for erectile dysfunction.  Referral to Dr. Jag Vizcarra for consideration of salvage radiotherapy  Return 6 months with repeat PSA      Fercho Serra MD   [facility] Urology  [facility] Phone: [phone]

## 2021-08-17 NOTE — PATIENT INSTRUCTIONS
Continue tadalafil yuly as needed for erectile dysfunction      Referral to Dr. Jag Vizcarra    Return 6 months with repeat PSA

## 2021-08-17 NOTE — PROGRESS NOTES
"CHIEF COMPLAINT   Yogi Moreland who is a 61 year old male returns today for follow-up of prostate cancer s/p RRP + BPLND 7/11/2017 (iPSA 7.46 ng/ml, [place] 3+4=7 [id number], negative margins)    HPI   Yogi Moreland is a 61 year old male who presents with a history of prostate cancer s/p RRP + BPLND 7/11/2017 (iPSA 7.46 ng/ml, [place] 3+4=7 [id number], negative margins)    No issues with urination. Has ED but oral medications are working well (tadalafil yuly)    Slowly rising PSA, now 0.05 ng/ml    PHYSICAL EXAM  Patient is a 61 year old  male   Vitals: Height 1.803 m (5' 11\"), weight 79.8 kg (176 lb).  Body mass index is 24.55 kg/m .  General Appearance Adult:   Alert, no acute distress, oriented  HENT: throat/mouth:normal, good dentition  Lungs: no respiratory distress, or pursed lip breathing  Heart: No obvious jugular venous distension present  Abdomen: soft, nontender, no organomegaly or masses  Musculoskeltal: extremities normal, no peripheral edema  Skin: no suspicious lesions or rashes  Neuro: Alert, oriented, speech and mentation normal  Psych: affect and mood normal  Gait: Normal      Component PSA PSA Diag Urologic Phys   Latest Ref Rng & Units 0.00 - 4.00 ug/L 0.00 - 4.00 ng/mL   1/23/2013 5.14 (H)    9/2/2016 5.53 (H)    11/2/2016 7.46 (H)    1/23/2017 7.61 (H)    8/21/2017 0.01    11/28/2017 <0.01    2/15/2018 <0.01    5/17/2018 <0.01    10/25/2018 0.01    11/29/2018  <0.04   3/4/2019 0.02    9/17/2019 0.02    2/18/2020 0.03    2/18/2021 0.04    8/13/2021 0.05        ASSESSMENT and PLAN  61 year old male who presents with a history of prostate cancer s/p RRP + BPLND 7/11/2017 (iPSA 7.46 ng/ml, [place] 3+4=7 [id number], negative margins). Has biochemical recurrence, PSA now 0.05 ng/ml. Discussed the slow rate of rise of PSA. Offered continued observation vs. Referral for consideration of salvage radiotherapy. Patient opts to talk to [facility]    Refilled tadalafil yuly for erectile dysfunction.  Referral to " Dr. Jag Vizcarra for consideration of salvage radiotherapy  Return 6 months with repeat PSA      Fercho Serra MD   [facility] Urology  [facility] Phone: [phone]

## 2021-08-26 ENCOUNTER — TRANSFERRED RECORDS (OUTPATIENT)
Dept: HEALTH INFORMATION MANAGEMENT | Facility: CLINIC | Age: 61
End: 2021-08-26

## 2021-09-28 ENCOUNTER — TRANSFERRED RECORDS (OUTPATIENT)
Dept: HEALTH INFORMATION MANAGEMENT | Facility: CLINIC | Age: 61
End: 2021-09-28

## 2021-10-13 ENCOUNTER — MYC MEDICAL ADVICE (OUTPATIENT)
Dept: FAMILY MEDICINE | Facility: CLINIC | Age: 61
End: 2021-10-13

## 2021-10-14 NOTE — TELEPHONE ENCOUNTER
Summary:    Patient is due/failing the following:   Flu vaccine    Reviewed:  [] CARE EVERYWHERE  [] LAST OV NOTE INCLUDING ENDO  [] FYI TAB  [] MYCHART ACTIVE?  [] LAST PANEL ENCOUNTER  [] FUTURE APPTS  [] IMMUNIZATIONS          Action needed:   Patient needs nurse only appointment.    Type of outreach:    Sent [email or web address] message.                                                                               Joanne Eller/MANDY  [facility]

## 2022-01-06 ENCOUNTER — LAB (OUTPATIENT)
Dept: LAB | Facility: CLINIC | Age: 62
End: 2022-01-06
Payer: COMMERCIAL

## 2022-01-06 DIAGNOSIS — R97.21 BIOCHEMICALLY RECURRENT MALIGNANT NEOPLASM OF PROSTATE (H): ICD-10-CM

## 2022-01-06 DIAGNOSIS — C61 BIOCHEMICALLY RECURRENT MALIGNANT NEOPLASM OF PROSTATE (H): ICD-10-CM

## 2022-01-06 LAB — PSA SERPL-MCNC: 0.06 UG/L (ref 0–4)

## 2022-01-06 PROCEDURE — 84153 ASSAY OF PSA TOTAL: CPT

## 2022-01-06 PROCEDURE — 36415 COLL VENOUS BLD VENIPUNCTURE: CPT

## 2022-01-11 ENCOUNTER — OFFICE VISIT (OUTPATIENT)
Dept: UROLOGY | Facility: CLINIC | Age: 62
End: 2022-01-11
Payer: COMMERCIAL

## 2022-01-11 VITALS
WEIGHT: 180 LBS | BODY MASS INDEX: 25.2 KG/M2 | HEIGHT: 71 IN | SYSTOLIC BLOOD PRESSURE: 132 MMHG | DIASTOLIC BLOOD PRESSURE: 72 MMHG

## 2022-01-11 DIAGNOSIS — N52.31 ERECTILE DYSFUNCTION AFTER RADICAL PROSTATECTOMY: ICD-10-CM

## 2022-01-11 DIAGNOSIS — C61 BIOCHEMICALLY RECURRENT MALIGNANT NEOPLASM OF PROSTATE (H): Primary | ICD-10-CM

## 2022-01-11 DIAGNOSIS — R97.21 BIOCHEMICALLY RECURRENT MALIGNANT NEOPLASM OF PROSTATE (H): Primary | ICD-10-CM

## 2022-01-11 PROCEDURE — 99214 OFFICE O/P EST MOD 30 MIN: CPT | Performed by: STUDENT IN AN ORGANIZED HEALTH CARE EDUCATION/TRAINING PROGRAM

## 2022-01-11 ASSESSMENT — PAIN SCALES - GENERAL: PAINLEVEL: NO PAIN (0)

## 2022-01-11 ASSESSMENT — MIFFLIN-ST. JEOR: SCORE: 1633.6

## 2022-01-11 NOTE — LETTER
"1/11/2022       RE: Yogi Moreland  [address] [facility] [address]     Dear Colleague,    Thank you for referring your patient, Yogi Moreland, to the [facility] UROLOGY CLINIC [place] at [facility]. Please see a copy of my visit note below.    CHIEF COMPLAINT   Yogi Moreland who is a 61 year old male returns today for follow-up of prostate cancer s/p RRP + BPLND 7/11/2017 (iPSA 7.46 ng/ml, [place] 3+4=7 [id number], negative margins)    HPI   Yogi Moreland is a 61 year old male returns today for follow-up of prostate cancer s/p RRP + BPLND 7/11/2017 (iPSA 7.46 ng/ml, [place] 3+4=7 [id number], negative margins), with biochemical recurrence, erectile dysfunction    Was referred to Dr. Vizcarra for consideration of salvage radiotherapy. Decision at that time was to hold off on radiation until PSA rises further    Erectile dysfunction stable on tadalafil    PHYSICAL EXAM  Patient is a 61 year old  male   Vitals: Blood pressure 132/72, height 1.803 m (5' 11\"), weight 81.6 kg (180 lb).  Body mass index is 25.1 kg/m .  General Appearance Adult:   Alert, no acute distress, oriented  HENT: throat/mouth:normal, good dentition  Lungs: no respiratory distress, or pursed lip breathing  Heart: No obvious jugular venous distension present  Abdomen: soft, nontender, no organomegaly or masses  Musculoskeltal: extremities normal, no peripheral edema  Skin: no suspicious lesions or rashes  Neuro: Alert, oriented, speech and mentation normal  Psych: affect and mood normal  Gait: Normal    Component PSA PSA Diag Urologic Phys   Latest Ref Rng & Units 0.00 - 4.00 ug/L 0.00 - 4.00 ng/mL   1/23/2013 5.14 (H)    9/2/2016 5.53 (H)    11/2/2016 7.46 (H)    1/23/2017 7.61 (H)    8/21/2017 0.01    11/28/2017 <0.01    2/15/2018 <0.01    5/17/2018 <0.01    10/25/2018 0.01    11/29/2018  <0.04   3/4/2019 0.02    9/17/2019 0.02    2/18/2020 0.03    2/18/2021 0.04    8/13/2021 0.05  "   1/6/2022 0.06        ASSESSMENT and PLAN   61 year old male returns today for follow-up of prostate cancer s/p RRP + BPLND 7/11/2017 (iPSA 7.46 ng/ml, [place] 3+4=7 [id number], negative margins), with biochemical recurrence, erectile dysfunction    PSA now 0.06 ng/ml. Will continue to monitor this slowly rising PSA carefully. If PSA velocity increases or certainly if PSA reaches threshold of 0.20 ng/ml he should go back to radiation oncology for further discussion of salvage radiotherapy.    Erectile function doing well on tadalafil yuly prn, continue this    Follow up ~6 months with PSA prior      Fercho Serra MD   [facility] Urology  [facility] Phone: [phone]

## 2022-01-11 NOTE — NURSING NOTE
Chief Complaint   Patient presents with     Prostate Cancer     Here to review PSA results.   Melissa Rosario LPN

## 2022-01-11 NOTE — PROGRESS NOTES
"CHIEF COMPLAINT   Yogi Moreland who is a 61 year old male returns today for follow-up of prostate cancer s/p RRP + BPLND 7/11/2017 (iPSA 7.46 ng/ml, [place] 3+4=7 [id number], negative margins)    HPI   Yogi Moreland is a 61 year old male returns today for follow-up of prostate cancer s/p RRP + BPLND 7/11/2017 (iPSA 7.46 ng/ml, [place] 3+4=7 [id number], negative margins), with biochemical recurrence, erectile dysfunction    Was referred to Dr. Vizcarra for consideration of salvage radiotherapy. Decision at that time was to hold off on radiation until PSA rises further    Erectile dysfunction stable on tadalafil    PHYSICAL EXAM  Patient is a 61 year old  male   Vitals: Blood pressure 132/72, height 1.803 m (5' 11\"), weight 81.6 kg (180 lb).  Body mass index is 25.1 kg/m .  General Appearance Adult:   Alert, no acute distress, oriented  HENT: throat/mouth:normal, good dentition  Lungs: no respiratory distress, or pursed lip breathing  Heart: No obvious jugular venous distension present  Abdomen: soft, nontender, no organomegaly or masses  Musculoskeltal: extremities normal, no peripheral edema  Skin: no suspicious lesions or rashes  Neuro: Alert, oriented, speech and mentation normal  Psych: affect and mood normal  Gait: Normal    Component PSA PSA Diag Urologic Phys   Latest Ref Rng & Units 0.00 - 4.00 ug/L 0.00 - 4.00 ng/mL   1/23/2013 5.14 (H)    9/2/2016 5.53 (H)    11/2/2016 7.46 (H)    1/23/2017 7.61 (H)    8/21/2017 0.01    11/28/2017 <0.01    2/15/2018 <0.01    5/17/2018 <0.01    10/25/2018 0.01    11/29/2018  <0.04   3/4/2019 0.02    9/17/2019 0.02    2/18/2020 0.03    2/18/2021 0.04    8/13/2021 0.05    1/6/2022 0.06        ASSESSMENT and PLAN   61 year old male returns today for follow-up of prostate cancer s/p RRP + BPLND 7/11/2017 (iPSA 7.46 ng/ml, [place] 3+4=7 [id number], negative margins), with biochemical recurrence, erectile dysfunction    PSA now 0.06 ng/ml. Will continue to monitor this slowly rising PSA " carefully. If PSA velocity increases or certainly if PSA reaches threshold of 0.20 ng/ml he should go back to radiation oncology for further discussion of salvage radiotherapy.    Erectile function doing well on tadalafil yuly prn, continue this    Follow up ~6 months with PSA prior      Fercho Serra MD   [facility] Urology  [facility] Phone: [phone]

## 2022-04-10 ENCOUNTER — HEALTH MAINTENANCE LETTER (OUTPATIENT)
Age: 62
End: 2022-04-10

## 2022-07-19 ENCOUNTER — LAB (OUTPATIENT)
Dept: LAB | Facility: CLINIC | Age: 62
End: 2022-07-19
Payer: COMMERCIAL

## 2022-07-19 ENCOUNTER — DOCUMENTATION ONLY (OUTPATIENT)
Dept: LAB | Facility: CLINIC | Age: 62
End: 2022-07-19

## 2022-07-19 DIAGNOSIS — C61 BIOCHEMICALLY RECURRENT MALIGNANT NEOPLASM OF PROSTATE (H): Primary | ICD-10-CM

## 2022-07-19 DIAGNOSIS — R97.21 BIOCHEMICALLY RECURRENT MALIGNANT NEOPLASM OF PROSTATE (H): ICD-10-CM

## 2022-07-19 DIAGNOSIS — C61 BIOCHEMICALLY RECURRENT MALIGNANT NEOPLASM OF PROSTATE (H): ICD-10-CM

## 2022-07-19 DIAGNOSIS — R97.21 BIOCHEMICALLY RECURRENT MALIGNANT NEOPLASM OF PROSTATE (H): Primary | ICD-10-CM

## 2022-07-19 LAB
HOLD SPECIMEN: NORMAL
HOLD SPECIMEN: NORMAL
PSA SERPL-MCNC: 0.07 UG/L (ref 0–4)

## 2022-07-19 PROCEDURE — 36415 COLL VENOUS BLD VENIPUNCTURE: CPT

## 2022-07-19 PROCEDURE — 84153 ASSAY OF PSA TOTAL: CPT

## 2022-07-19 NOTE — PROGRESS NOTES
Yogi had a lab only appointment today, 7/19/22. He was requesting a PSA test. There are no orders in the chart. We have drawn extra tubes and are holding them in the lab. Please place the order if this test is needed. If not, please have your team contact the patient to let him know. Thanks, Margarita

## 2022-07-21 ENCOUNTER — TELEPHONE (OUTPATIENT)
Dept: FAMILY MEDICINE | Facility: CLINIC | Age: 62
End: 2022-07-21

## 2022-07-21 ENCOUNTER — OFFICE VISIT (OUTPATIENT)
Dept: UROLOGY | Facility: CLINIC | Age: 62
End: 2022-07-21
Payer: COMMERCIAL

## 2022-07-21 VITALS
WEIGHT: 180 LBS | DIASTOLIC BLOOD PRESSURE: 80 MMHG | BODY MASS INDEX: 25.2 KG/M2 | SYSTOLIC BLOOD PRESSURE: 132 MMHG | HEIGHT: 71 IN | HEART RATE: 67 BPM

## 2022-07-21 DIAGNOSIS — R97.21 BIOCHEMICALLY RECURRENT MALIGNANT NEOPLASM OF PROSTATE (H): Primary | ICD-10-CM

## 2022-07-21 DIAGNOSIS — N52.31 ERECTILE DYSFUNCTION AFTER RADICAL PROSTATECTOMY: ICD-10-CM

## 2022-07-21 DIAGNOSIS — N52.9 ED (ERECTILE DYSFUNCTION): Primary | ICD-10-CM

## 2022-07-21 DIAGNOSIS — C61 BIOCHEMICALLY RECURRENT MALIGNANT NEOPLASM OF PROSTATE (H): Primary | ICD-10-CM

## 2022-07-21 PROCEDURE — 99214 OFFICE O/P EST MOD 30 MIN: CPT | Performed by: STUDENT IN AN ORGANIZED HEALTH CARE EDUCATION/TRAINING PROGRAM

## 2022-07-21 ASSESSMENT — PAIN SCALES - GENERAL: PAINLEVEL: NO PAIN (0)

## 2022-07-21 NOTE — TELEPHONE ENCOUNTER
Prescription for the compounded [facility]afil [facility].  Other sites do not do this kind of compounding, and we will not be able to bill insurance for the best price like the compounding pharmacy.    Thanks,  Melinda Augustin, [facility]  [phone]

## 2022-07-21 NOTE — PROGRESS NOTES
"CHIEF COMPLAINT   Yogi Moreland who is a 62 year old male returns today for follow-up of prostate cancer s/p RRP + BPLND 7/11/2017 (iPSA 7.46 ng/ml, [place] 3+4=7 [id number], negative margins), erectile dysfunction    HPI   Yogi Moreland is a 62 year old male who presents with a history of prostate cancer s/p RRP + BPLND 7/11/2017 (iPSA 7.46 ng/ml, [place] 3+4=7 [id number], negative margins), erectile dysfunction    Has seen Dr. Vizcarra for consideration of salvage radiation but has deferred treatment    Erectile dysfunction is stable on tadalfil yuly    PHYSICAL EXAM  Patient is a 62 year old  male   Vitals: Blood pressure 132/80, pulse 67, height 1.803 m (5' 11\"), weight 81.6 kg (180 lb).  Body mass index is 25.1 kg/m .  General Appearance Adult:   Alert, no acute distress, oriented  HENT: throat/mouth:normal, good dentition  Lungs: no respiratory distress, or pursed lip breathing  Heart: No obvious jugular venous distension present  Abdomen: soft, nontender, no organomegaly or masses  Musculoskeltal: extremities normal, no peripheral edema  Skin: no suspicious lesions or rashes  Neuro: Alert, oriented, speech and mentation normal  Psych: affect and mood normal  Gait: Normal      Component PSA PSA Diag Urologic Phys   Latest Ref Rng & Units 0.00 - 4.00 ug/L 0.00 - 4.00 ng/mL   1/23/2013 5.14 (H)    9/2/2016 5.53 (H)    11/2/2016 7.46 (H)    1/23/2017 7.61 (H)    8/21/2017 0.01    11/28/2017 <0.01    2/15/2018 <0.01    5/17/2018 <0.01    10/25/2018 0.01    11/29/2018  <0.04   3/4/2019 0.02    9/17/2019 0.02    2/18/2020 0.03    2/18/2021 0.04    8/13/2021 0.05    1/6/2022 0.06    7/19/2022 0.07        ASSESSMENT and PLAN   62 year old male who presents with a history of prostate cancer s/p RRP + BPLND 7/11/2017 (iPSA 7.46 ng/ml, [place] 3+4=7 [id number], negative margins), erectile dysfunction    Very slow rise of PSA now 0.07. He has seen rad onc and decided to defer salvage radiotherapy. I think it is reasonable to " wait another 6-12 months prior to next check and if concerning rise then to reconsider salvage radiation    He asked about sildenafil vs. Tadalafil and after discussion wishes to continue tadalafil    Erectile dysfunction on tadalafil yuly working well, continue this, refilled  Follow up 6-12 months with PSA prior    Fercho Serra MD   [facility] Urology  [facility] Phone: [phone]

## 2022-07-21 NOTE — NURSING NOTE
Chief Complaint   Patient presents with     Prostate Cancer     6 months with PSA     Edyta Drummond, CMA

## 2022-07-21 NOTE — LETTER
"7/21/2022       RE: Yogi Moreland  [address] [address] [address]     Dear Colleague,    Thank you for referring your patient, Yogi Moreland, to the [facility] UROLOGY CLINIC [place] at [facility]. Please see a copy of my visit note below.    CHIEF COMPLAINT   Yogi Moreland who is a 62 year old male returns today for follow-up of prostate cancer s/p RRP + BPLND 7/11/2017 (iPSA 7.46 ng/ml, [place] 3+4=7 [id number], negative margins), erectile dysfunction    HPI   Yogi Moreland is a 62 year old male who presents with a history of prostate cancer s/p RRP + BPLND 7/11/2017 (iPSA 7.46 ng/ml, [place] 3+4=7 [id number], negative margins), erectile dysfunction    Has seen Dr. Vizcarra for consideration of salvage radiation but has deferred treatment    Erectile dysfunction is stable on tadalfil yuly    PHYSICAL EXAM  Patient is a 62 year old  male   Vitals: Blood pressure 132/80, pulse 67, height 1.803 m (5' 11\"), weight 81.6 kg (180 lb).  Body mass index is 25.1 kg/m .  General Appearance Adult:   Alert, no acute distress, oriented  HENT: throat/mouth:normal, good dentition  Lungs: no respiratory distress, or pursed lip breathing  Heart: No obvious jugular venous distension present  Abdomen: soft, nontender, no organomegaly or masses  Musculoskeltal: extremities normal, no peripheral edema  Skin: no suspicious lesions or rashes  Neuro: Alert, oriented, speech and mentation normal  Psych: affect and mood normal  Gait: Normal      Component PSA PSA Diag Urologic Phys   Latest Ref Rng & Units 0.00 - 4.00 ug/L 0.00 - 4.00 ng/mL   1/23/2013 5.14 (H)    9/2/2016 5.53 (H)    11/2/2016 7.46 (H)    1/23/2017 7.61 (H)    8/21/2017 0.01    11/28/2017 <0.01    2/15/2018 <0.01    5/17/2018 <0.01    10/25/2018 0.01    11/29/2018  <0.04   3/4/2019 0.02    9/17/2019 0.02    2/18/2020 0.03    2/18/2021 0.04    8/13/2021 0.05    1/6/2022 0.06    7/19/2022 0.07  "       ASSESSMENT and PLAN   62 year old male who presents with a history of prostate cancer s/p RRP + BPLND 7/11/2017 (iPSA 7.46 ng/ml, [place] 3+4=7 [id number], negative margins), erectile dysfunction    Very slow rise of PSA now 0.07. He has seen rad onc and decided to defer salvage radiotherapy. I think it is reasonable to wait another 6-12 months prior to next check and if concerning rise then to reconsider salvage radiation    He asked about sildenafil vs. Tadalafil and after discussion wishes to continue tadalafil    Erectile dysfunction on tadalafil yuly working well, continue this, refilled  Follow up 6-12 months with PSA prior    Fercho Serra MD   [facility] Urology  [facility] Phone: [phone]

## 2022-07-28 ENCOUNTER — TELEPHONE (OUTPATIENT)
Dept: UROLOGY | Facility: CLINIC | Age: 62
End: 2022-07-28

## 2022-07-28 DIAGNOSIS — N52.9 ED (ERECTILE DYSFUNCTION): Primary | ICD-10-CM

## 2022-08-02 RX ORDER — SILDENAFIL 50 MG/1
50 TABLET, FILM COATED ORAL DAILY PRN
Qty: 30 TABLET | Refills: 0 | Status: SHIPPED | OUTPATIENT
Start: 2022-08-02 | End: 2023-01-24

## 2022-08-05 DIAGNOSIS — N52.9 ED (ERECTILE DYSFUNCTION): ICD-10-CM

## 2022-08-05 RX ORDER — SILDENAFIL CITRATE 20 MG/1
20 TABLET ORAL DAILY PRN
Qty: 30 TABLET | Refills: 1 | Status: SHIPPED | OUTPATIENT
Start: 2022-08-05 | End: 2023-01-24

## 2022-09-14 ENCOUNTER — TELEPHONE (OUTPATIENT)
Dept: FAMILY MEDICINE | Facility: CLINIC | Age: 62
End: 2022-09-14

## 2022-09-14 ENCOUNTER — MYC MEDICAL ADVICE (OUTPATIENT)
Dept: FAMILY MEDICINE | Facility: CLINIC | Age: 62
End: 2022-09-14

## 2022-09-14 NOTE — TELEPHONE ENCOUNTER
Patient Quality Outreach    Patient is due for the following:   Depression  -  PHQ-9 needed  Physical Preventive Adult Physical    Next Steps:   Schedule a Annual Wellness Visit    Type of outreach:    Sent [employer] message.    Next Steps:  Reach out within 90 days via [employer].    Max number of attempts reached: No. Will try again in 90 days if patient still on fail list.    Questions for provider review:    None     So Yeh CMA  Chart routed to So Yeh CMA  .

## 2022-12-12 ENCOUNTER — NURSE TRIAGE (OUTPATIENT)
Dept: NURSING | Facility: CLINIC | Age: 62
End: 2022-12-12

## 2022-12-12 NOTE — TELEPHONE ENCOUNTER
This morning felt like he was getting a cold. Wife reminded him that he should take a covid test, and it was positive. History of prostate cancer. Fully vaccinated. States that he feels thirsty.    Chrystal Yeh RN on 12/12/2022 at 4:33 PM    Reason for Disposition    [1] HIGH RISK for severe COVID complications (e.g., weak immune system, age > 64 years, obesity with BMI of 30 or higher, pregnant, chronic lung disease or other chronic medical condition) AND [2] COVID symptoms (e.g., cough, fever)  (Exceptions: Already seen by PCP and no new or worsening symptoms.)    Additional Information    Negative: SEVERE difficulty breathing (e.g., struggling for each breath, speaks in single words)    Negative: Difficult to awaken or acting confused (e.g., disoriented, slurred speech)    Negative: Bluish (or gray) lips or face now    Negative: Shock suspected (e.g., cold/pale/clammy skin, too weak to stand, low BP, rapid pulse)    Negative: Sounds like a life-threatening emergency to the triager    Negative: [1] Diagnosed or suspected COVID-19 AND [2] symptoms lasting 3 or more weeks    Negative: [1] COVID-19 exposure AND [2] no symptoms    Negative: COVID-19 vaccine reaction suspected (e.g., fever, headache, muscle aches) occurring 1 to 3 days after getting vaccine    Negative: COVID-19 vaccine, questions about    Negative: [1] Lives with someone known to have influenza (flu test positive) AND [2] flu-like symptoms (e.g., cough, runny nose, sore throat, SOB; with or without fever)    Negative: [1] Adult with possible COVID-19 symptoms AND [2] triager concerned about severity of symptoms or other causes    Negative: COVID-19 and breastfeeding, questions about    Negative: SEVERE or constant chest pain or pressure  (Exception: Mild central chest pain, present only when coughing.)    Negative: MODERATE difficulty breathing (e.g., speaks in phrases, SOB even at rest, pulse 100-120)    Negative: Headache and stiff neck (can't  touch chin to chest)    Negative: Oxygen level (e.g., pulse oximetry) 90 percent or lower    Negative: Chest pain or pressure  (Exception: MILD central chest pain, present only when coughing)    Negative: Patient sounds very sick or weak to the triager    Negative: MILD difficulty breathing (e.g., minimal/no SOB at rest, SOB with walking, pulse <100)    Negative: Fever > 103 F (39.4 C)    Negative: [1] Fever > 101 F (38.3 C) AND [2] over 60 years of age    Negative: [1] Fever > 100.0 F (37.8 C) AND [2] bedridden (e.g., nursing home patient, CVA, chronic illness, recovering from surgery)    Protocols used: CORONAVIRUS (COVID-19) DIAGNOSED OR [id number]

## 2022-12-13 ENCOUNTER — VIRTUAL VISIT (OUTPATIENT)
Dept: FAMILY MEDICINE | Facility: CLINIC | Age: 62
End: 2022-12-13
Payer: COMMERCIAL

## 2022-12-13 DIAGNOSIS — U07.1 CLINICAL DIAGNOSIS OF COVID-19: Primary | ICD-10-CM

## 2022-12-13 PROCEDURE — 99214 OFFICE O/P EST MOD 30 MIN: CPT | Mod: CS | Performed by: PHYSICIAN ASSISTANT

## 2022-12-13 NOTE — PROGRESS NOTES
"Yogi is a 62 year old who is being evaluated via a billable telephone visit.      What phone number would you like to be contacted at? [phone]  How would you like to obtain your AVS? Venessa    Distant Location (provider location):  Off-site    Assessment & Plan     Clinical diagnosis of COVID-19  Discussed risks and benefits including side effects.  Patient if fully vaccinated and with most recent bivalent booster.  He is feeling okay and wondering if side effects of Paxlovid are worse than how he is feeling. Reviewed risks and benefits. He will discuss with his wife and called the pharmacy to let them know if he is indeed going to move forward with treatment.  - nirmatrelvir and ritonavir (PAXLOVID) therapy pack; Take 3 tablets by mouth 2 times daily for 5 days (Take 2 Nirmatrelvir tablets and 1 Ritonavir tablet twice daily for 5 days)    Prescription drug management  30 minutes spent on the date of the encounter doing chart review, history and exam, documentation and further activities per the note       COVID-19 positive patient.  Encounter for consideration of medication intervention. Patient does qualify for a prescription. Full discussion with patient including medication options, risks and benefits. Potential drug interactions reviewed with patient.     Treatment Planned Paxlovid, Rx sent to [facility] [phone] [address] [place] [address]    Hours:  Mon-Fri: 8:00a - 5:00p     Drive Thru available  Temporary change to home medications: Instructed to not take the Tadalafil or the sildenafil while on treatment.    Estimated body mass index is 25.1 kg/m  as calculated from the following:    Height as of 7/21/22: 1.803 m (5' 11\").    Weight as of 7/21/22: 81.6 kg (180 lb).  GFR Estimate   Date Value Ref Range Status   02/18/2021 86 >60 mL/min/[1.73_m2] Final     Comment:     Non  GFR Calc  Starting 12/18/2018, serum creatinine based estimated " "GFR (eGFR) will be   calculated using the Chronic Kidney Disease Epidemiology Collaboration   (CKD-EPI) equation.       Lab Results   Component Value Date    [id number] Not Detected 07/11/2020       Return for previously scheduled visit.    Nickie Jeffers PA-C  [facility] JACQUE Calix is a 62 year old, presenting for the following health issues:  Covid Concern      History of Present Illness       Reason for visit:  Covid- positive test  Symptom onset:  1-3 days ago  Symptoms include:  Cough, fever, tired  Symptom intensity:  Moderate  Had these symptoms before:  No    He eats 2-3 servings of fruits and vegetables daily.He consumes 1 sweetened beverage(s) daily.He exercises with enough effort to increase his heart rate 9 or less minutes per day.  He exercises with enough effort to increase his heart rate 4 days per week.   He is taking medications regularly.     Symptoms started 12/11/22. Started with sore throat but began to feel worse and tested positive yesterday.    He notes he is feeling weak, slight fever, cough.  He denies any shortness of breath.    He has used some Advil for fevers and body aches.    He has history of prostate cancer. Currently being monitored by urology.    Review of Systems   GENERAL:  As noted in HPI  RESP:  As noted in HPI        Objective    Vitals - Patient Reported  Weight (Patient Reported): 77.1 kg (170 lb)  Height (Patient Reported): 180.3 cm (5' 11\")  BMI (Based on Pt Reported Ht/Wt): 23.71  Temperature (Patient Reported): 100.4  F (38  C)      Vitals:  No vitals were obtained today due to virtual visit.    Physical Exam   healthy, alert and no distress  PSYCH: Alert and oriented; coherent speech, normal   rate and volume, able to articulate logical thoughts  or delusions  His affect is normal  RESP: Occasional cough, no audible wheezing, able to talk in full sentences  Remainder of exam unable to be completed due to telephone visits      "     Phone call duration: 22 minutes

## 2022-12-13 NOTE — PATIENT INSTRUCTIONS
"Based on your risks, I have prescribed an antiviral medication that may help to reduce your risk of hospitalization due to COVID-19.  This medication is called \"Paxlovid\" and is being prescribed to:    [facility] [phone] [address] [place] [address]    Hours:  Mon-Fri: 8:00a - 5:00p     Drive Thru available.     Please call the pharmacy before going to verify that they have the medication on hand.  If they do not, they can tell you which pharmacy you may go to.      There are multiple potential drug interactions with Paxlovid. Your other medications may need to be adjusted or held in order to safely take Paxlovid. You can request to talk with the pharmacist about other potential drug interactions. Please have an updated list of medications you are taking.     "

## 2022-12-14 ENCOUNTER — MYC MEDICAL ADVICE (OUTPATIENT)
Dept: FAMILY MEDICINE | Facility: CLINIC | Age: 62
End: 2022-12-14

## 2022-12-14 ENCOUNTER — TELEPHONE (OUTPATIENT)
Dept: FAMILY MEDICINE | Facility: CLINIC | Age: 62
End: 2022-12-14

## 2022-12-14 NOTE — TELEPHONE ENCOUNTER
Received call from pt's wife  See MC message     Should pt continue to take the paxlovid despite s/e of dizziness and blurred vision?    Please route back to triage to call or send my chart message with recommendations    Routing to ordering provider and PCP    Thank you  Jillian Toro RN on 12/14/2022 at 2:54 PM

## 2022-12-14 NOTE — TELEPHONE ENCOUNTER
These reactions not described.   However, these are relatively new mediccations  Pt is fully vaccinated  Recommend stopping paxlovid  Kiel Torres MD

## 2023-01-17 SDOH — ECONOMIC STABILITY: FOOD INSECURITY: WITHIN THE PAST 12 MONTHS, YOU WORRIED THAT YOUR FOOD WOULD RUN OUT BEFORE YOU GOT MONEY TO BUY MORE.: NEVER TRUE

## 2023-01-17 SDOH — ECONOMIC STABILITY: TRANSPORTATION INSECURITY
IN THE PAST 12 MONTHS, HAS THE LACK OF TRANSPORTATION KEPT YOU FROM MEDICAL APPOINTMENTS OR FROM GETTING MEDICATIONS?: NO

## 2023-01-17 SDOH — ECONOMIC STABILITY: INCOME INSECURITY: HOW HARD IS IT FOR YOU TO PAY FOR THE VERY BASICS LIKE FOOD, HOUSING, MEDICAL CARE, AND HEATING?: NOT HARD AT ALL

## 2023-01-17 SDOH — ECONOMIC STABILITY: TRANSPORTATION INSECURITY
IN THE PAST 12 MONTHS, HAS LACK OF TRANSPORTATION KEPT YOU FROM MEETINGS, WORK, OR FROM GETTING THINGS NEEDED FOR DAILY LIVING?: NO

## 2023-01-17 SDOH — ECONOMIC STABILITY: FOOD INSECURITY: WITHIN THE PAST 12 MONTHS, THE FOOD YOU BOUGHT JUST DIDN'T LAST AND YOU DIDN'T HAVE MONEY TO GET MORE.: NEVER TRUE

## 2023-01-17 SDOH — ECONOMIC STABILITY: INCOME INSECURITY: IN THE LAST 12 MONTHS, WAS THERE A TIME WHEN YOU WERE NOT ABLE TO PAY THE MORTGAGE OR RENT ON TIME?: NO

## 2023-01-17 SDOH — HEALTH STABILITY: PHYSICAL HEALTH: ON AVERAGE, HOW MANY MINUTES DO YOU ENGAGE IN EXERCISE AT THIS LEVEL?: 30 MIN

## 2023-01-17 SDOH — HEALTH STABILITY: PHYSICAL HEALTH: ON AVERAGE, HOW MANY DAYS PER WEEK DO YOU ENGAGE IN MODERATE TO STRENUOUS EXERCISE (LIKE A BRISK WALK)?: 2 DAYS

## 2023-01-17 ASSESSMENT — LIFESTYLE VARIABLES
HOW OFTEN DO YOU HAVE A DRINK CONTAINING ALCOHOL: 4 OR MORE TIMES A WEEK
AUDIT-C TOTAL SCORE: 4
HOW OFTEN DO YOU HAVE SIX OR MORE DRINKS ON ONE OCCASION: NEVER
HOW MANY STANDARD DRINKS CONTAINING ALCOHOL DO YOU HAVE ON A TYPICAL DAY: 1 OR 2
SKIP TO QUESTIONS 9-10: 1

## 2023-01-17 ASSESSMENT — ENCOUNTER SYMPTOMS
CHILLS: 0
JOINT SWELLING: 0
DYSURIA: 0
SORE THROAT: 0
FEVER: 0
NERVOUS/ANXIOUS: 0
DIZZINESS: 0
NAUSEA: 0
MYALGIAS: 0
ABDOMINAL PAIN: 0
HEARTBURN: 0
HEMATURIA: 0
FREQUENCY: 0
HEADACHES: 0
PALPITATIONS: 0
EYE PAIN: 0
SHORTNESS OF BREATH: 0
COUGH: 0
DIARRHEA: 0
ARTHRALGIAS: 0
CONSTIPATION: 0
HEMATOCHEZIA: 0
PARESTHESIAS: 0
WEAKNESS: 0

## 2023-01-17 ASSESSMENT — SOCIAL DETERMINANTS OF HEALTH (SDOH)
HOW OFTEN DO YOU ATTEND CHURCH OR RELIGIOUS SERVICES?: NEVER
DO YOU BELONG TO ANY CLUBS OR ORGANIZATIONS SUCH AS CHURCH GROUPS UNIONS, FRATERNAL OR ATHLETIC GROUPS, OR SCHOOL GROUPS?: NO
HOW OFTEN DO YOU GET TOGETHER WITH FRIENDS OR RELATIVES?: TWICE A WEEK
IN A TYPICAL WEEK, HOW MANY TIMES DO YOU TALK ON THE PHONE WITH FAMILY, FRIENDS, OR NEIGHBORS?: MORE THAN THREE TIMES A WEEK

## 2023-01-24 ENCOUNTER — OFFICE VISIT (OUTPATIENT)
Dept: FAMILY MEDICINE | Facility: CLINIC | Age: 63
End: 2023-01-24
Payer: COMMERCIAL

## 2023-01-24 VITALS
HEIGHT: 71 IN | HEART RATE: 68 BPM | OXYGEN SATURATION: 99 % | RESPIRATION RATE: 16 BRPM | SYSTOLIC BLOOD PRESSURE: 116 MMHG | WEIGHT: 182 LBS | DIASTOLIC BLOOD PRESSURE: 78 MMHG | BODY MASS INDEX: 25.48 KG/M2 | TEMPERATURE: 97.9 F

## 2023-01-24 DIAGNOSIS — R79.89 ELEVATED LFTS: ICD-10-CM

## 2023-01-24 DIAGNOSIS — R97.21 BIOCHEMICALLY RECURRENT MALIGNANT NEOPLASM OF PROSTATE (H): ICD-10-CM

## 2023-01-24 DIAGNOSIS — Z85.46 HISTORY OF PROSTATE CANCER: ICD-10-CM

## 2023-01-24 DIAGNOSIS — E78.5 HYPERLIPIDEMIA LDL GOAL <160: ICD-10-CM

## 2023-01-24 DIAGNOSIS — G62.9 NEUROPATHY: ICD-10-CM

## 2023-01-24 DIAGNOSIS — R73.01 IMPAIRED FASTING GLUCOSE: ICD-10-CM

## 2023-01-24 DIAGNOSIS — C61 BIOCHEMICALLY RECURRENT MALIGNANT NEOPLASM OF PROSTATE (H): ICD-10-CM

## 2023-01-24 DIAGNOSIS — Z00.00 ROUTINE GENERAL MEDICAL EXAMINATION AT A HEALTH CARE FACILITY: Primary | ICD-10-CM

## 2023-01-24 LAB
ALBUMIN SERPL BCG-MCNC: 4.9 G/DL (ref 3.5–5.2)
ALP SERPL-CCNC: 61 U/L (ref 40–129)
ALT SERPL W P-5'-P-CCNC: 37 U/L (ref 10–50)
ANION GAP SERPL CALCULATED.3IONS-SCNC: 13 MMOL/L (ref 7–15)
AST SERPL W P-5'-P-CCNC: 39 U/L (ref 10–50)
BASOPHILS # BLD AUTO: 0 10E3/UL (ref 0–0.2)
BASOPHILS NFR BLD AUTO: 1 %
BILIRUB SERPL-MCNC: 0.9 MG/DL
BUN SERPL-MCNC: 22.9 MG/DL (ref 8–23)
CALCIUM SERPL-MCNC: 9.9 MG/DL (ref 8.8–10.2)
CHLORIDE SERPL-SCNC: 103 MMOL/L (ref 98–107)
CHOLEST SERPL-MCNC: 264 MG/DL
CREAT SERPL-MCNC: 0.91 MG/DL (ref 0.67–1.17)
DEPRECATED HCO3 PLAS-SCNC: 26 MMOL/L (ref 22–29)
EOSINOPHIL # BLD AUTO: 0.1 10E3/UL (ref 0–0.7)
EOSINOPHIL NFR BLD AUTO: 2 %
ERYTHROCYTE [DISTWIDTH] IN BLOOD BY AUTOMATED COUNT: 14 % (ref 10–15)
GFR SERPL CREATININE-BSD FRML MDRD: >90 ML/MIN/1.73M2
GLUCOSE SERPL-MCNC: 106 MG/DL (ref 70–99)
HBA1C MFR BLD: 5.7 % (ref 0–5.6)
HCT VFR BLD AUTO: 50.8 % (ref 40–53)
HDLC SERPL-MCNC: 41 MG/DL
HGB BLD-MCNC: 16.8 G/DL (ref 13.3–17.7)
LDLC SERPL CALC-MCNC: 196 MG/DL
LYMPHOCYTES # BLD AUTO: 1.4 10E3/UL (ref 0.8–5.3)
LYMPHOCYTES NFR BLD AUTO: 23 %
MCH RBC QN AUTO: 30.8 PG (ref 26.5–33)
MCHC RBC AUTO-ENTMCNC: 33.1 G/DL (ref 31.5–36.5)
MCV RBC AUTO: 93 FL (ref 78–100)
MONOCYTES # BLD AUTO: 0.6 10E3/UL (ref 0–1.3)
MONOCYTES NFR BLD AUTO: 10 %
NEUTROPHILS # BLD AUTO: 4 10E3/UL (ref 1.6–8.3)
NEUTROPHILS NFR BLD AUTO: 65 %
NONHDLC SERPL-MCNC: 223 MG/DL
PLATELET # BLD AUTO: 230 10E3/UL (ref 150–450)
POTASSIUM SERPL-SCNC: 5.2 MMOL/L (ref 3.4–5.3)
PROT SERPL-MCNC: 7.3 G/DL (ref 6.4–8.3)
PSA SERPL-MCNC: 0.15 NG/ML (ref 0–4.5)
RBC # BLD AUTO: 5.46 10E6/UL (ref 4.4–5.9)
SODIUM SERPL-SCNC: 142 MMOL/L (ref 136–145)
TRIGL SERPL-MCNC: 135 MG/DL
TSH SERPL DL<=0.005 MIU/L-ACNC: 1.95 UIU/ML (ref 0.3–4.2)
WBC # BLD AUTO: 6.2 10E3/UL (ref 4–11)

## 2023-01-24 PROCEDURE — 83036 HEMOGLOBIN GLYCOSYLATED A1C: CPT | Performed by: FAMILY MEDICINE

## 2023-01-24 PROCEDURE — 80061 LIPID PANEL: CPT | Performed by: FAMILY MEDICINE

## 2023-01-24 PROCEDURE — 99213 OFFICE O/P EST LOW 20 MIN: CPT | Mod: 25 | Performed by: FAMILY MEDICINE

## 2023-01-24 PROCEDURE — 99396 PREV VISIT EST AGE 40-64: CPT | Performed by: FAMILY MEDICINE

## 2023-01-24 PROCEDURE — 80050 GENERAL HEALTH PANEL: CPT | Performed by: FAMILY MEDICINE

## 2023-01-24 PROCEDURE — 84153 ASSAY OF PSA TOTAL: CPT | Performed by: FAMILY MEDICINE

## 2023-01-24 PROCEDURE — 36415 COLL VENOUS BLD VENIPUNCTURE: CPT | Performed by: FAMILY MEDICINE

## 2023-01-24 ASSESSMENT — ENCOUNTER SYMPTOMS
DIARRHEA: 0
PARESTHESIAS: 0
JOINT SWELLING: 0
HEMATURIA: 0
ABDOMINAL PAIN: 0
PALPITATIONS: 0
DIZZINESS: 0
SHORTNESS OF BREATH: 0
HEADACHES: 0
FREQUENCY: 0
HEARTBURN: 0
CHILLS: 0
CONSTIPATION: 0
COUGH: 0
HEMATOCHEZIA: 0
WEAKNESS: 0
NERVOUS/ANXIOUS: 0
MYALGIAS: 0
ARTHRALGIAS: 0
SORE THROAT: 0
NAUSEA: 0
DYSURIA: 0
EYE PAIN: 0
FEVER: 0

## 2023-01-24 NOTE — PROGRESS NOTES
SUBJECTIVE:   CC: Yogi is an 62 year old who presents for preventative health visit.   Patient has been advised of split billing requirements and indicates understanding: Yes  Healthy Habits:     Getting at least 3 servings of Calcium per day:  Yes    Bi-annual eye exam:  NO    Dental care twice a year:  NO    Sleep apnea or symptoms of sleep apnea:  None    Diet:  Regular (no restrictions)    Frequency of exercise:  2-3 days/week    Taking medications regularly:  Yes    Medication side effects:  Not applicable and None    PHQ-2 Total Score: 0    Additional concerns today:  No      Today's PHQ-2 Score:   PHQ-2 ( 1999 Pfizer) 1/17/2023   Q1: Little interest or pleasure in doing things 0   Q2: Feeling down, depressed or hopeless 0   PHQ-2 Score 0   PHQ-2 Total Score (12-17 Years)- Positive if 3 or more points; Administer PHQ-A if positive -   Q1: Little interest or pleasure in doing things Not at all   Q2: Feeling down, depressed or hopeless Not at all   PHQ-2 Score 0       Social History     Tobacco Use     Smoking status: Never     Smokeless tobacco: Never   Substance Use Topics     Alcohol use: Yes     Alcohol/week: 0.0 standard drinks     Comment: 7-10 week       Alcohol Use 1/17/2023   Prescreen: >3 drinks/day or >7 drinks/week? No   Prescreen: >3 drinks/day or >7 drinks/week? -     Last PSA:   PSA   Date Value Ref Range Status   02/18/2021 0.04 0 - 4 ug/L Final     Comment:     Assay Method:  Chemiluminescence using Siemens Vista analyzer     PSA Tumor Marker   Date Value Ref Range Status   07/19/2022 0.07 0.00 - 4.00 ug/L Final       Reviewed orders with patient. Reviewed health maintenance and updated orders accordingly - Yes      Reviewed and updated as needed this visit by clinical staff    Allergies  Meds              Reviewed and updated as needed this visit by Provider     Meds                 Review of Systems   Constitutional: Negative for chills and fever.   HENT: Negative for congestion, ear pain,  "hearing loss and sore throat.    Eyes: Negative for pain and visual disturbance.   Respiratory: Negative for cough and shortness of breath.    Cardiovascular: Negative for chest pain, palpitations and peripheral edema.   Gastrointestinal: Negative for abdominal pain, constipation, diarrhea, heartburn, hematochezia and nausea.   Genitourinary: Positive for impotence. Negative for dysuria, frequency, genital sores, hematuria, penile discharge and urgency.   Musculoskeletal: Negative for arthralgias, joint swelling and myalgias.   Skin: Negative for rash.   Neurological: Negative for dizziness, weakness, headaches and paresthesias.   Psychiatric/Behavioral: Negative for mood changes. The patient is not nervous/anxious.          OBJECTIVE:   /78 (BP Location: Right arm, Patient Position: Chair, Cuff Size: Adult Regular)   Pulse 68   Temp 97.9  F (36.6  C) (Oral)   Resp 16   Ht 1.803 m (5' 11\")   Wt 82.6 kg (182 lb)   SpO2 99%   BMI 25.38 kg/m      Physical Exam  Constitutional:       Appearance: Normal appearance.   HENT:      Head: Atraumatic.      Right Ear: Tympanic membrane normal.      Left Ear: Tympanic membrane normal.      Nose: Nose normal.      Mouth/Throat:      Mouth: Mucous membranes are moist.   Eyes:      Pupils: Pupils are equal, round, and reactive to light.   Cardiovascular:      Rate and Rhythm: Normal rate and regular rhythm.      Heart sounds: Normal heart sounds.   Pulmonary:      Effort: Pulmonary effort is normal.      Breath sounds: Normal breath sounds.   Abdominal:      General: Abdomen is flat. Bowel sounds are normal.   Musculoskeletal:      Cervical back: Neck supple.      Right lower leg: No edema.      Left lower leg: No edema.   Skin:     General: Skin is warm and dry.   Neurological:      General: No focal deficit present.      Mental Status: He is alert.   Psychiatric:         Mood and Affect: Mood normal.               ASSESSMENT/PLAN:     Problem List Items Addressed " "This Visit     Biochemically recurrent malignant neoplasm of prostate (H)     Urology will seem him shortly         Elevated LFTs     Re evaluate         History of prostate cancer     Urology follows         Hyperlipidemia LDL goal <160     He is developing xanthelasma, is statin candidate. He would like to remeasure. Discussed         Relevant Orders    Comprehensive metabolic panel (BMP + Alb, Alk Phos, ALT, AST, Total. Bili, TP)    TSH with free T4 reflex    Lipid panel reflex to direct LDL Fasting    Impaired fasting glucose     Broaden data base  . Not Pre diabetes at last measure         Relevant Orders    Hemoglobin A1c (Completed)    Neuropathy     Serologic eval in past. Hypoesthetic, in general painless. HS nsaid satisfactory. Discussed EMG: he will consider        Other Visit Diagnoses     Routine general medical examination at a health care facility    -  Primary    Relevant Orders    CBC with platelets and differential (Completed)          Patient has been advised of split billing requirements and indicates understanding: Yes      COUNSELING:   Reviewed preventive health counseling, as reflected in patient instructions      BMI:   Estimated body mass index is 25.38 kg/m  as calculated from the following:    Height as of this encounter: 1.803 m (5' 11\").    Weight as of this encounter: 82.6 kg (182 lb).         He reports that he has never smoked. He has never used smokeless tobacco.        Kiel Torres MD  [facility]  "

## 2023-01-24 NOTE — ASSESSMENT & PLAN NOTE
Serologic eval in past. Hypoesthetic, in general painless. HS nsaid satisfactory. Discussed EMG: he will consider

## 2023-01-24 NOTE — PROGRESS NOTES
The 10-year ASCVD risk score (Leslie BARNARD, et al., 2019) is: 15.5%    Values used to calculate the score:      Age: 62 years      Sex: Male      Is Non- : No      Diabetic: No      Tobacco smoker: No      Systolic Blood Pressure: 116 mmHg      Is BP treated: No      HDL Cholesterol: 35 mg/dL      Total Cholesterol: 272 mg/dL    Answers for HPI/ROS submitted by the patient on 1/17/2023  Frequency of exercise:: 2-3 days/week  Getting at least 3 servings of Calcium per day:: Yes  Diet:: Regular (no restrictions)  Taking medications regularly:: Yes  Medication side effects:: Not applicable, None  Bi-annual eye exam:: NO  Dental care twice a year:: NO  Sleep apnea or symptoms of sleep apnea:: None  abdominal pain: No  Blood in stool: No  Blood in urine: No  chest pain: No  chills: No  congestion: No  constipation: No  cough: No  diarrhea: No  dizziness: No  ear pain: No  eye pain: No  nervous/anxious: No  fever: No  frequency: No  genital sores: No  headaches: No  hearing loss: No  heartburn: No  arthralgias: No  joint swelling: No  peripheral edema: No  mood changes: No  myalgias: No  nausea: No  dysuria: No  palpitations: No  Skin sensation changes: No  sore throat: No  urgency: No  rash: No  shortness of breath: No  visual disturbance: No  weakness: No  impotence: Yes  penile discharge: No  Additional concerns today:: No

## 2023-01-24 NOTE — RESULT ENCOUNTER NOTE
You now have prediabetes. We will watch that yearly. The cholesterol medication is a good idea  Kiel Torres MD

## 2023-01-25 ENCOUNTER — MYC MEDICAL ADVICE (OUTPATIENT)
Dept: FAMILY MEDICINE | Facility: CLINIC | Age: 63
End: 2023-01-25
Payer: COMMERCIAL

## 2023-01-25 DIAGNOSIS — E78.5 HYPERLIPIDEMIA LDL GOAL <160: Primary | ICD-10-CM

## 2023-01-25 RX ORDER — ROSUVASTATIN CALCIUM 40 MG/1
40 TABLET, COATED ORAL DAILY
Qty: 90 TABLET | Refills: 3 | Status: SHIPPED | OUTPATIENT
Start: 2023-01-25 | End: 2024-01-29

## 2023-01-25 NOTE — TELEPHONE ENCOUNTER
For provider review.   When should patient recheck cholesterol? Need future orders placed.  Or   Does patient need to start statin now?  Alison Ham RN

## 2023-01-25 NOTE — TELEPHONE ENCOUNTER
He wanted to recheck it, first.  The 10-year ASCVD risk score (Leslie BARNARD, et al., 2019) is: 13.8%    Values used to calculate the score:      Age: 62 years      Sex: Male      Is Non- : No      Diabetic: No      Tobacco smoker: No      Systolic Blood Pressure: 116 mmHg      Is BP treated: No      HDL Cholesterol: 41 mg/dL      Total Cholesterol: 264 mg/dL  Rx sent to  [facility]  Check lab yearly  Kiel Torres MD

## 2023-01-26 ENCOUNTER — OFFICE VISIT (OUTPATIENT)
Dept: UROLOGY | Facility: CLINIC | Age: 63
End: 2023-01-26
Payer: COMMERCIAL

## 2023-01-26 VITALS
WEIGHT: 182 LBS | BODY MASS INDEX: 25.48 KG/M2 | HEART RATE: 69 BPM | DIASTOLIC BLOOD PRESSURE: 80 MMHG | HEIGHT: 71 IN | SYSTOLIC BLOOD PRESSURE: 133 MMHG

## 2023-01-26 DIAGNOSIS — R97.21 BIOCHEMICALLY RECURRENT MALIGNANT NEOPLASM OF PROSTATE (H): Primary | ICD-10-CM

## 2023-01-26 DIAGNOSIS — C61 BIOCHEMICALLY RECURRENT MALIGNANT NEOPLASM OF PROSTATE (H): Primary | ICD-10-CM

## 2023-01-26 PROCEDURE — 99213 OFFICE O/P EST LOW 20 MIN: CPT | Performed by: STUDENT IN AN ORGANIZED HEALTH CARE EDUCATION/TRAINING PROGRAM

## 2023-01-26 ASSESSMENT — PAIN SCALES - GENERAL: PAINLEVEL: NO PAIN (0)

## 2023-01-26 NOTE — PROGRESS NOTES
"CHIEF COMPLAINT   Yogi Moreland who is a 62 year old male returns today for follow-up of prostate cancer s/p RRP + BPLND 7/11/2017 (iPSA 7.46 ng/ml, [place] 3+4=7 [id number], negative margins), erectile dysfunction     HPI   Yogi Moreland is a 62 year old male who presents with a history of prostate cancer s/p RRP + BPLND 7/11/2017 (iPSA 7.46 ng/ml, [place] 3+4=7 [id number], negative margins) with biochemical recurrence, erectile dysfunction    PHYSICAL EXAM  Patient is a 62 year old  male   Vitals: Blood pressure 133/80, pulse 69, height 1.803 m (5' 11\"), weight 82.6 kg (182 lb).  Body mass index is 25.38 kg/m .  General Appearance Adult:   Alert, no acute distress, oriented  HENT: throat/mouth:normal, good dentition  Lungs: no respiratory distress, or pursed lip breathing  Heart: No obvious jugular venous distension present  Abdomen: nondistended  Musculoskeltal: extremities normal, no peripheral edema  Skin: no suspicious lesions or rashes  Neuro: Alert, oriented, speech and mentation normal  Psych: affect and mood normal  Gait: Normal  : deferred    Component PSA PSA Diag Urologic Phys   Latest Ref Rng & Units 0.00 - 4.00 ug/L 0.00 - 4.00 ng/mL   1/23/2013 5.14 (H)    9/2/2016 5.53 (H)    11/2/2016 7.46 (H)    1/23/2017 7.61 (H)    8/21/2017 0.01    11/28/2017 <0.01    2/15/2018 <0.01    5/17/2018 <0.01    10/25/2018 0.01    11/29/2018  <0.04   3/4/2019 0.02    9/17/2019 0.02    2/18/2020 0.03    2/18/2021 0.04    8/13/2021 0.05    1/6/2022 0.06    7/19/2022 0.07    1/24/2023       Component PSA Tumor Marker   Latest Ref Rng & Units 0.00 - 4.50 ng/mL   1/23/2013    9/2/2016    11/2/2016    1/23/2017    8/21/2017    11/28/2017    2/15/2018    5/17/2018    10/25/2018    11/29/2018    3/4/2019    9/17/2019    2/18/2020    2/18/2021    8/13/2021    1/6/2022    7/19/2022    1/24/2023 0.15       ASSESSMENT and PLAN  62 year old male who presents with a history of prostate cancer s/p RRP + BPLND 7/11/2017 (iPSA 7.46 " ng/ml, [place] 3+4=7 [id number], negative margins) with biochemical recurrence, erectile dysfunction    I discussed his continued rising PSA and that he should be seen again for consideration of salvage radiation therapy. I briefly discussed the addition of a short course of androgen deprivation therapy to the radiation therapy but he can decide whether or not to get this after seeing RT    He has seen Dr. Vizcarra in the past but wants to get treatment closer to home. Will refer to [facility]    - see rad onc @ [facility]  - return 3 months after completing radiation therapy with PSA      Fercho Serra MD   [facility] Urology  [facility] Phone: [phone]

## 2023-01-26 NOTE — NURSING NOTE
Chief Complaint   Patient presents with     Prostate Cancer     Follow up with PSA     Edyta Drummond CMA

## 2023-01-26 NOTE — LETTER
"1/26/2023       RE: Yogi Moreland  [id number] [facility] [address]     Dear Colleague,    Thank you for referring your patient, Yogi Moreland, to the [facility]OLOGY [facility] [place] at [facility]. Please see a copy of my visit note below.    CHIEF COMPLAINT   Yogi Moreland who is a 62 year old male returns today for follow-up of prostate cancer s/p RRP + BPLND 7/11/2017 (iPSA 7.46 ng/ml, [place] 3+4=7 [id number], negative margins), erectile dysfunction     HPI   Yogi Moreland is a 62 year old male who presents with a history of prostate cancer s/p RRP + BPLND 7/11/2017 (iPSA 7.46 ng/ml, [place] 3+4=7 [id number], negative margins) with biochemical recurrence, erectile dysfunction    PHYSICAL EXAM  Patient is a 62 year old  male   Vitals: Blood pressure 133/80, pulse 69, height 1.803 m (5' 11\"), weight 82.6 kg (182 lb).  Body mass index is 25.38 kg/m .  General Appearance Adult:   Alert, no acute distress, oriented  HENT: throat/mouth:normal, good dentition  Lungs: no respiratory distress, or pursed lip breathing  Heart: No obvious jugular venous distension present  Abdomen: nondistended  Musculoskeltal: extremities normal, no peripheral edema  Skin: no suspicious lesions or rashes  Neuro: Alert, oriented, speech and mentation normal  Psych: affect and mood normal  Gait: Normal  : deferred    Component PSA PSA Diag Urologic Phys   Latest Ref Rng & Units 0.00 - 4.00 ug/L 0.00 - 4.00 ng/mL   1/23/2013 5.14 (H)    9/2/2016 5.53 (H)    11/2/2016 7.46 (H)    1/23/2017 7.61 (H)    8/21/2017 0.01    11/28/2017 <0.01    2/15/2018 <0.01    5/17/2018 <0.01    10/25/2018 0.01    11/29/2018  <0.04   3/4/2019 0.02    9/17/2019 0.02    2/18/2020 0.03    2/18/2021 0.04    8/13/2021 0.05    1/6/2022 0.06    7/19/2022 0.07    1/24/2023       Component PSA Tumor Marker   Latest Ref Rng & Units 0.00 - 4.50 ng/mL   1/23/2013 9/2/2016 11/2/2016 1/23/2017  "   8/21/2017    11/28/2017    2/15/2018    5/17/2018    10/25/2018    11/29/2018    3/4/2019    9/17/2019    2/18/2020    2/18/2021    8/13/2021    1/6/2022    7/19/2022    1/24/2023 0.15       ASSESSMENT and PLAN  62 year old male who presents with a history of prostate cancer s/p RRP + BPLND 7/11/2017 (iPSA 7.46 ng/ml, [place] 3+4=7 [id number], negative margins) with biochemical recurrence, erectile dysfunction    I discussed his continued rising PSA and that he should be seen again for consideration of salvage radiation therapy. I briefly discussed the addition of a short course of androgen deprivation therapy to the radiation therapy but he can decide whether or not to get this after seeing RT    He has seen Dr. Vizcarra in the past but wants to get treatment closer to home. Will refer to [facility]    - see rad onc @ [facility]  - return 3 months after completing radiation therapy with PSA      Fercho Serra MD   [facility] Urology  [facility] Phone: [phone]

## 2023-02-20 ENCOUNTER — TRANSFERRED RECORDS (OUTPATIENT)
Dept: HEALTH INFORMATION MANAGEMENT | Facility: CLINIC | Age: 63
End: 2023-02-20
Payer: COMMERCIAL

## 2023-03-01 ENCOUNTER — TELEPHONE (OUTPATIENT)
Dept: UROLOGY | Facility: CLINIC | Age: 63
End: 2023-03-01
Payer: COMMERCIAL

## 2023-03-01 DIAGNOSIS — N52.9 ED (ERECTILE DYSFUNCTION): Primary | ICD-10-CM

## 2023-03-01 NOTE — TELEPHONE ENCOUNTER
[facility] Call Center    Phone Message    May a detailed message be left on voicemail: yes     Reason for Call: Medication Refill Request    Has the patient contacted the pharmacy for the refill? Yes   Name of medication being requested:   - sildenafil (REVATIO) 20 MG tablet  - Tadalafil trouche 17.5 mg - COMPOUNDED NON-CONTROLLED SUBSTANCE (CMPD RX) - PHARMACY TO MIX COMPOUNDED MEDICATION   Provider who prescribed the medication: Maryse  Pharmacy: [address]  Date medication is needed: ASAP    Action Taken: Message routed to:  Other: Uro    Travel Screening: Not Applicable

## 2023-03-03 NOTE — TELEPHONE ENCOUNTER
Pt is calling again looking for an update on getting his medications refilled- please call allyson- thank you

## 2023-03-03 NOTE — TELEPHONE ENCOUNTER
Pt callled back to see why he was contacted and writer informed pt that the clinic is waiting for approval from Dr. Serra. Pt said to please contact him if there is any questions they'd like to discuss. Thanks

## 2023-03-03 NOTE — TELEPHONE ENCOUNTER
Returned phone call to patient and LM. We are waiting on status of approval from MD Neal Ham LPN

## 2023-03-07 RX ORDER — SILDENAFIL CITRATE 20 MG/1
20 TABLET ORAL PRN
Qty: 30 TABLET | Refills: 1 | Status: SHIPPED | OUTPATIENT
Start: 2023-03-07 | End: 2023-08-30

## 2023-03-13 ENCOUNTER — TELEPHONE (OUTPATIENT)
Dept: UROLOGY | Facility: CLINIC | Age: 63
End: 2023-03-13
Payer: COMMERCIAL

## 2023-03-13 NOTE — TELEPHONE ENCOUNTER
Spoke with patient, informed him that he can make a virtual visit to discuss his PSA results in June 2023.  Patient states understanding.    Tammie Rivera RN, BSN  [facility]

## 2023-03-13 NOTE — TELEPHONE ENCOUNTER
[facility] Call Center    Phone Message    May a detailed message be left on voicemail: yes     Reason for Call: Pt scheduled PSA lab for 3 months after finishing radiation per notes from last appt. Pt wondering if he needs to schedule a follow up appt to go over results or if this can be done over the phone. Please call pt to discuss. Thank you    Action Taken: Message routed to:  Other: Uro    Travel Screening: Not Applicable

## 2023-03-27 ENCOUNTER — TRANSFERRED RECORDS (OUTPATIENT)
Dept: HEALTH INFORMATION MANAGEMENT | Facility: CLINIC | Age: 63
End: 2023-03-27
Payer: COMMERCIAL

## 2023-03-31 ENCOUNTER — TRANSFERRED RECORDS (OUTPATIENT)
Dept: HEALTH INFORMATION MANAGEMENT | Facility: CLINIC | Age: 63
End: 2023-03-31
Payer: COMMERCIAL

## 2023-06-21 ENCOUNTER — LAB (OUTPATIENT)
Dept: LAB | Facility: CLINIC | Age: 63
End: 2023-06-21
Payer: COMMERCIAL

## 2023-06-21 DIAGNOSIS — R97.21 BIOCHEMICALLY RECURRENT MALIGNANT NEOPLASM OF PROSTATE (H): ICD-10-CM

## 2023-06-21 DIAGNOSIS — C61 BIOCHEMICALLY RECURRENT MALIGNANT NEOPLASM OF PROSTATE (H): ICD-10-CM

## 2023-06-21 LAB — PSA SERPL DL<=0.01 NG/ML-MCNC: 0.02 NG/ML (ref 0–4.5)

## 2023-06-21 PROCEDURE — 36415 COLL VENOUS BLD VENIPUNCTURE: CPT

## 2023-06-21 PROCEDURE — 84153 ASSAY OF PSA TOTAL: CPT

## 2023-06-22 ENCOUNTER — VIRTUAL VISIT (OUTPATIENT)
Dept: UROLOGY | Facility: CLINIC | Age: 63
End: 2023-06-22
Payer: COMMERCIAL

## 2023-06-22 VITALS — BODY MASS INDEX: 24.5 KG/M2 | HEIGHT: 71 IN | WEIGHT: 175 LBS

## 2023-06-22 DIAGNOSIS — R97.21 BIOCHEMICALLY RECURRENT MALIGNANT NEOPLASM OF PROSTATE (H): Primary | ICD-10-CM

## 2023-06-22 DIAGNOSIS — C61 BIOCHEMICALLY RECURRENT MALIGNANT NEOPLASM OF PROSTATE (H): Primary | ICD-10-CM

## 2023-06-22 PROCEDURE — 99213 OFFICE O/P EST LOW 20 MIN: CPT | Mod: VID | Performed by: STUDENT IN AN ORGANIZED HEALTH CARE EDUCATION/TRAINING PROGRAM

## 2023-06-22 ASSESSMENT — PAIN SCALES - GENERAL: PAINLEVEL: NO PAIN (0)

## 2023-06-22 NOTE — LETTER
"6/22/2023       RE: Yogi Moreland  [address]   [place] [address]     Dear Colleague,    Thank you for referring your patient, Yogi Moreland, to the [facility]OLOGY [facility] [place] at [facility]. Please see a copy of my visit note below.    ** Patient will meet you in My Chart    Yogi is a 62 year old who is being evaluated via a billable video visit.      How would you like to obtain your AVS? MyChart  If the video visit is dropped, the invitation should be resent by: Text to cell phone: [phone]  Will anyone else be joining your video visit? No        CHIEF COMPLAINT   Yogi Moreland who is a 62 year old male returns today for follow-up of prostate cancer s/p RRP + BPLND 7/11/2017 (iPSA 7.46 ng/ml, [place] 3+4=7 [id number], negative margins) with biochemical recurrence, erectile dysfunction.      HPI   Yogi Moreland is a 62 year old male returns today for follow-up of prostate cancer s/p RRP + BPLND 7/11/2017 (iPSA 7.46 ng/ml, [place] 3+4=7 [id number], negative margins) with biochemical recurrence, erectile dysfunction, now completed salvage RT 3/27/2023 (Zackery)    He did not received any androgen deprivation therapy    Doing well after RT, no concerning s/e    Here to discuss PSA    PHYSICAL EXAM  Patient is a 62 year old  male   Vitals: Height 1.803 m (5' 11\"), weight 79.4 kg (175 lb).  Body mass index is 24.41 kg/m .  General Appearance Adult:   Alert, no acute distress, oriented  HENT: throat/mouth:normal, good dentition  Lungs: no respiratory distress, or pursed lip breathing  Heart: No obvious jugular venous distension present  Musculoskeltal: extremities normal, no peripheral edema  Skin: no suspicious lesions or rashes  Neuro: Alert, oriented, speech and mentation normal  Psych: affect and mood normal  Gait: Normal       PSA PSA Diag Urologic Phys PSA Tumor Marker   Latest Ref Rng 0.00 - 4.00 ug/L 0.00 - 4.00 ng/mL 0.00 - 4.50 ng/mL "   1/23/2013  8:48 AM 5.14 (H)      9/2/2016  8:13 AM 5.53 (H)      11/2/2016  7:57 AM 7.46 (H)      1/23/2017  3:59 PM 7.61 (H)      8/21/2017  1:20 PM 0.01      11/28/2017  7:58 AM <0.01      2/15/2018  8:17 AM <0.01      5/17/2018  9:04 AM <0.01      10/25/2018  10:03 AM 0.01      11/29/2018  11:17 AM  <0.04     3/4/2019  4:08 PM 0.02      9/17/2019  10:38 AM 0.02      2/18/2020  11:45 AM 0.03      2/18/2021  9:52 AM 0.04      8/13/2021  8:38 AM 0.05      1/6/2022  9:40 AM 0.06      7/19/2022  8:30 AM 0.07      1/24/2023  9:51 AM   0.15    6/21/2023  9:28 AM   0.02       Legend:  (H) High      ASSESSMENT and PLAN  62 year old male returns today for follow-up of prostate cancer s/p RRP + BPLND 7/11/2017 (iPSA 7.46 ng/ml, [place] 3+4=7 [id number], negative margins) with biochemical recurrence, erectile dysfunction, now completed salvage RT 3/27/2023 (Zackery)    Discussed PSA 0.02 down from 0.15 prior to RT and the potential implications. We will continue to closely monitor every 6 months. If concerning rise, may consider restaging imaging vs. Starting androgen deprivation in the future    - return 6 months with PSA prior      Fercho Serra MD   [facility] Urology  [facility] Phone: [phone]      Video-Visit Details    Type of service:  Video Visit     Originating Location (pt. Location): Home    Distant Location (provider location):  On-site  Platform used for Video Visit: Love

## 2023-06-22 NOTE — PROGRESS NOTES
"** Patient will meet you in My Chart    Yogi is a 62 year old who is being evaluated via a billable video visit.      How would you like to obtain your AVS? MyChart  If the video visit is dropped, the invitation should be resent by: Text to cell phone: [phone]  Will anyone else be joining your video visit? No        CHIEF COMPLAINT   Yogi Moreland who is a 62 year old male returns today for follow-up of prostate cancer s/p RRP + BPLND 7/11/2017 (iPSA 7.46 ng/ml, [place] 3+4=7 [id number], negative margins) with biochemical recurrence, erectile dysfunction.      HPI   Yogi Moreland is a 62 year old male returns today for follow-up of prostate cancer s/p RRP + BPLND 7/11/2017 (iPSA 7.46 ng/ml, [place] 3+4=7 [id number], negative margins) with biochemical recurrence, erectile dysfunction, now completed salvage RT 3/27/2023 (Zackery)    He did not received any androgen deprivation therapy    Doing well after RT, no concerning s/e    Here to discuss PSA    PHYSICAL EXAM  Patient is a 62 year old  male   Vitals: Height 1.803 m (5' 11\"), weight 79.4 kg (175 lb).  Body mass index is 24.41 kg/m .  General Appearance Adult:   Alert, no acute distress, oriented  HENT: throat/mouth:normal, good dentition  Lungs: no respiratory distress, or pursed lip breathing  Heart: No obvious jugular venous distension present  Musculoskeltal: extremities normal, no peripheral edema  Skin: no suspicious lesions or rashes  Neuro: Alert, oriented, speech and mentation normal  Psych: affect and mood normal  Gait: Normal       PSA PSA Diag Urologic Phys PSA Tumor Marker   Latest Ref Rng 0.00 - 4.00 ug/L 0.00 - 4.00 ng/mL 0.00 - 4.50 ng/mL   1/23/2013  8:48 AM 5.14 (H)      9/2/2016  8:13 AM 5.53 (H)      11/2/2016  7:57 AM 7.46 (H)      1/23/2017  3:59 PM 7.61 (H)      8/21/2017  1:20 PM 0.01      11/28/2017  7:58 AM <0.01      2/15/2018  8:17 AM <0.01      5/17/2018  9:04 AM <0.01      10/25/2018  10:03 AM 0.01      11/29/2018  11:17 AM  <0.04   "   3/4/2019  4:08 PM 0.02      9/17/2019  10:38 AM 0.02      2/18/2020  11:45 AM 0.03      2/18/2021  9:52 AM 0.04      8/13/2021  8:38 AM 0.05      1/6/2022  9:40 AM 0.06      7/19/2022  8:30 AM 0.07      1/24/2023  9:51 AM   0.15    6/21/2023  9:28 AM   0.02       Legend:  (H) High      ASSESSMENT and PLAN  62 year old male returns today for follow-up of prostate cancer s/p RRP + BPLND 7/11/2017 (iPSA 7.46 ng/ml, [place] 3+4=7 [id number], negative margins) with biochemical recurrence, erectile dysfunction, now completed salvage RT 3/27/2023 (Zackery)    Discussed PSA 0.02 down from 0.15 prior to RT and the potential implications. We will continue to closely monitor every 6 months. If concerning rise, may consider restaging imaging vs. Starting androgen deprivation in the future    - return 6 months with PSA prior      Fercho Serra MD   [facility] Urology  [facility] Phone: [phone]      Video-Visit Details    Type of service:  Video Visit     Originating Location (pt. Location): Home    Distant Location (provider location):  On-site  Platform used for Video Visit: Love

## 2023-06-30 ENCOUNTER — TELEPHONE (OUTPATIENT)
Dept: UROLOGY | Facility: CLINIC | Age: 63
End: 2023-06-30
Payer: COMMERCIAL

## 2023-08-30 ENCOUNTER — TELEPHONE (OUTPATIENT)
Facility: CLINIC | Age: 63
End: 2023-08-30
Payer: COMMERCIAL

## 2023-08-30 DIAGNOSIS — N52.9 ED (ERECTILE DYSFUNCTION): ICD-10-CM

## 2023-08-30 RX ORDER — SILDENAFIL CITRATE 20 MG/1
20 TABLET ORAL PRN
Qty: 30 TABLET | Refills: 3 | Status: SHIPPED | OUTPATIENT
Start: 2023-08-30 | End: 2023-12-21

## 2023-08-30 NOTE — TELEPHONE ENCOUNTER
[facility] Call Center    Phone Message    May a detailed message be left on voicemail: yes     Reason for Call: Medication Refill Request    Has the patient contacted the pharmacy for the refill? Yes   Name of medication being requested: sildenafil (REVATIO) 20 MG tablet [32392] (Order [id number]   Provider who prescribed the medication: daisha  [facility]: [facility] [place]  Date medication is needed: ASAP   Action Taken: Other: urology    Travel Screening: Not Applicable

## 2023-09-25 ENCOUNTER — TELEPHONE (OUTPATIENT)
Dept: UROLOGY | Facility: CLINIC | Age: 63
End: 2023-09-25
Payer: COMMERCIAL

## 2023-09-25 NOTE — TELEPHONE ENCOUNTER
[facility] Call Center    Phone Message    May a detailed message be left on voicemail: yes     Reason for Call: Medication Refill Request    Has the patient contacted the pharmacy for the refill? Yes   Name of medication being requested: sildenafil (REVATIO) 20 MG tablet   Provider who prescribed the medication: Maryse  Pharmacy:    [address]  Date medication is needed: The patient called stating he tried to refill at the pharmacy, but he was too early. The patient felt he was not getting the desired effect with this dosage, so he increased it. He spoke to the pharmacist who agreed he was not on a strong enough dose. Please review and contact the patient to discuss. Thank you.     Action Taken: Message routed to:  Other: Uro    Travel Screening: Not Applicable

## 2023-09-29 DIAGNOSIS — N52.9 ED (ERECTILE DYSFUNCTION): Primary | ICD-10-CM

## 2023-09-29 RX ORDER — SILDENAFIL CITRATE 20 MG/1
TABLET ORAL
Qty: 30 TABLET | Refills: 11 | Status: SHIPPED | OUTPATIENT
Start: 2023-09-29 | End: 2023-12-21

## 2023-09-29 NOTE — TELEPHONE ENCOUNTER
Sildenafil 20 mg tablets,  mg prn for erectile dysfunction, 30 tablets, 11 refills    I haven't actually discussed sildenafil at his appointments. He should bring up this issue at his appointment instead of calling in to ask for refills    Fercho Serra MD     RX sent in as above  Patient informed of directions

## 2023-12-20 ENCOUNTER — LAB (OUTPATIENT)
Dept: LAB | Facility: CLINIC | Age: 63
End: 2023-12-20
Payer: COMMERCIAL

## 2023-12-20 DIAGNOSIS — R97.21 BIOCHEMICALLY RECURRENT MALIGNANT NEOPLASM OF PROSTATE (H): ICD-10-CM

## 2023-12-20 DIAGNOSIS — C61 BIOCHEMICALLY RECURRENT MALIGNANT NEOPLASM OF PROSTATE (H): ICD-10-CM

## 2023-12-20 LAB — PSA SERPL DL<=0.01 NG/ML-MCNC: <0.01 NG/ML (ref 0–4.5)

## 2023-12-20 PROCEDURE — 84153 ASSAY OF PSA TOTAL: CPT

## 2023-12-20 PROCEDURE — 36415 COLL VENOUS BLD VENIPUNCTURE: CPT

## 2023-12-21 ENCOUNTER — VIRTUAL VISIT (OUTPATIENT)
Dept: UROLOGY | Facility: CLINIC | Age: 63
End: 2023-12-21
Payer: COMMERCIAL

## 2023-12-21 VITALS — BODY MASS INDEX: 25.1 KG/M2 | WEIGHT: 180 LBS

## 2023-12-21 DIAGNOSIS — N52.31 ERECTILE DYSFUNCTION AFTER RADICAL PROSTATECTOMY: ICD-10-CM

## 2023-12-21 DIAGNOSIS — R97.21 BIOCHEMICALLY RECURRENT MALIGNANT NEOPLASM OF PROSTATE (H): Primary | ICD-10-CM

## 2023-12-21 DIAGNOSIS — C61 BIOCHEMICALLY RECURRENT MALIGNANT NEOPLASM OF PROSTATE (H): Primary | ICD-10-CM

## 2023-12-21 PROCEDURE — 99214 OFFICE O/P EST MOD 30 MIN: CPT | Mod: VID | Performed by: STUDENT IN AN ORGANIZED HEALTH CARE EDUCATION/TRAINING PROGRAM

## 2023-12-21 RX ORDER — SILDENAFIL CITRATE 20 MG/1
TABLET ORAL
Qty: 30 TABLET | Refills: 11 | Status: SHIPPED | OUTPATIENT
Start: 2023-12-21 | End: 2024-09-11

## 2023-12-21 RX ORDER — SILDENAFIL CITRATE 20 MG/1
TABLET ORAL
Qty: 30 TABLET | Refills: 11 | Status: SHIPPED | OUTPATIENT
Start: 2023-12-21 | End: 2023-12-21

## 2023-12-21 NOTE — PROGRESS NOTES
CHIEF COMPLAINT   Yogi Moreland who is a 63 year old male returns today for follow-up of prostate cancer s/p RRP + BPLND 7/11/2017 (iPSA 7.46 ng/ml, [place] 3+4=7 [id number], negative margins) with biochemical recurrence s/p salvage RT completed 3/27/2023 ([place]), erectile dysfunction     HPI   Yogi Moreland is a 63 year old male returns today for follow-up of prostate cancer s/p RRP + BPLND 7/11/2017 (iPSA 7.46 ng/ml, [place] 3+4=7 [id number], negative margins) with biochemical recurrence s/p salvage RT completed 3/27/2023 ([place]), erectile dysfunction     Has not had any new urinary issues in the last 6 months. Denies gross hematuria or any worsening LUTS    ED persists, is using tadalafil yuly or sildenafil prn without issue      PHYSICAL EXAM  Patient is a 63 year old  male   Vitals: Weight 81.6 kg (180 lb).  Body mass index is 25.1 kg/m .  General Appearance Adult:   Alert, no acute distress, oriented  HENT: throat/mouth:normal, good dentition  Lungs: no respiratory distress, or pursed lip breathing  Heart: No obvious jugular venous distension present  Abdomen: nondistended  Musculoskeltal: extremities normal, no peripheral edema  Skin: no suspicious lesions or rashes  Neuro: Alert, oriented, speech and mentation normal  Psych: affect and mood normal      ASSESSMENT and PLAN  63 year old male returns today for follow-up of prostate cancer s/p RRP + BPLND 7/11/2017 (iPSA 7.46 ng/ml, [place] 3+4=7 [id number], negative margins) with biochemical recurrence s/p salvage RT completed 3/27/2023 ([place]), erectile dysfunction     PSA now undetectable after RT, good news. Hopefully will remain undetectable. Will check again 6 months and if still undetectable go back to annual checks. No ADT for now    Re: erectile dysfunction, patient wants to get a refill of sildenafil prn as well as the tadalafil troches. He knows not to use both at the same time, he tends to pick and choose which one depending on situation. I placed  refills for both (Rx drug management)    Return 6 months with PSA prior    Fercho Serra MD   [facility] Urology  [facility] Phone: [phone]

## 2023-12-21 NOTE — NURSING NOTE
Is the patient currently in the state of [place]? YES    Visit mode:VIDEO    If the visit is dropped, the patient can be reconnected by: VIDEO VISIT: Text to cell phone:   Telephone Information:   Mobile [phone]       Will anyone else be joining the visit? NO  (If patient encounters technical issues they should call [phone]    How would you like to obtain your AVS? MyChart    Are changes needed to the allergy or medication list? No    Reason for visit: RECHECK and Video Visit    Solange KAMARA

## 2023-12-21 NOTE — LETTER
12/21/2023       RE: Yogi Moreland  [address] [facility] [address]     Dear Colleague,    Thank you for referring your patient, Yogi Moreland, to the [facility] UROLOGY CLINIC [place] at [facility]. Please see a copy of my visit note below.    CHIEF COMPLAINT   Yogi Moreland who is a 63 year old male returns today for follow-up of prostate cancer s/p RRP + BPLND 7/11/2017 (iPSA 7.46 ng/ml, [place] 3+4=7 [id number], negative margins) with biochemical recurrence s/p salvage RT completed 3/27/2023 ([place]gena), erectile dysfunction     HPI   Yogi Moreland is a 63 year old male returns today for follow-up of prostate cancer s/p RRP + BPLND 7/11/2017 (iPSA 7.46 ng/ml, [place] 3+4=7 [id number], negative margins) with biochemical recurrence s/p salvage RT completed 3/27/2023 (Zackery), erectile dysfunction     Has not had any new urinary issues in the last 6 months. Denies gross hematuria or any worsening LUTS    ED persists, is using tadalafil yuly or sildenafil prn without issue      PHYSICAL EXAM  Patient is a 63 year old  male   Vitals: Weight 81.6 kg (180 lb).  Body mass index is 25.1 kg/m .  General Appearance Adult:   Alert, no acute distress, oriented  HENT: throat/mouth:normal, good dentition  Lungs: no respiratory distress, or pursed lip breathing  Heart: No obvious jugular venous distension present  Abdomen: nondistended  Musculoskeltal: extremities normal, no peripheral edema  Skin: no suspicious lesions or rashes  Neuro: Alert, oriented, speech and mentation normal  Psych: affect and mood normal      ASSESSMENT and PLAN  63 year old male returns today for follow-up of prostate cancer s/p RRP + BPLND 7/11/2017 (iPSA 7.46 ng/ml, [place] 3+4=7 [id number], negative margins) with biochemical recurrence s/p salvage RT completed 3/27/2023 ([place]), erectile dysfunction     PSA now undetectable after RT, good news. Hopefully will remain undetectable.  Will check again 6 months and if still undetectable go back to annual checks. No ADT for now    Re: erectile dysfunction, patient wants to get a refill of sildenafil prn as well as the tadalafil troches. He knows not to use both at the same time, he tends to pick and choose which one depending on situation. I placed refills for both (Rx drug management)    Return 6 months with PSA prior    Fercho Serra MD   [facility] Urology  [facility] Phone: [phone]

## 2023-12-21 NOTE — PATIENT INSTRUCTIONS
Continue your sildenafil and tadalafil as needed for ED, do not combine them    See you in 6 months with PSA prior

## 2024-01-23 ENCOUNTER — PATIENT OUTREACH (OUTPATIENT)
Dept: GASTROENTEROLOGY | Facility: CLINIC | Age: 64
End: 2024-01-23
Payer: COMMERCIAL

## 2024-01-29 ENCOUNTER — MYC REFILL (OUTPATIENT)
Dept: FAMILY MEDICINE | Facility: CLINIC | Age: 64
End: 2024-01-29
Payer: COMMERCIAL

## 2024-01-29 ENCOUNTER — TRANSFERRED RECORDS (OUTPATIENT)
Dept: HEALTH INFORMATION MANAGEMENT | Facility: CLINIC | Age: 64
End: 2024-01-29
Payer: COMMERCIAL

## 2024-01-29 DIAGNOSIS — E78.5 HYPERLIPIDEMIA LDL GOAL <160: ICD-10-CM

## 2024-01-29 RX ORDER — ROSUVASTATIN CALCIUM 40 MG/1
40 TABLET, COATED ORAL DAILY
Qty: 90 TABLET | Refills: 3 | OUTPATIENT
Start: 2024-01-29

## 2024-01-29 RX ORDER — ROSUVASTATIN CALCIUM 40 MG/1
40 TABLET, COATED ORAL DAILY
Qty: 90 TABLET | Refills: 0 | Status: SHIPPED | OUTPATIENT
Start: 2024-01-29 | End: 2024-05-07

## 2024-03-17 ENCOUNTER — HEALTH MAINTENANCE LETTER (OUTPATIENT)
Age: 64
End: 2024-03-17

## 2024-05-05 SDOH — HEALTH STABILITY: PHYSICAL HEALTH: ON AVERAGE, HOW MANY MINUTES DO YOU ENGAGE IN EXERCISE AT THIS LEVEL?: PATIENT DECLINED

## 2024-05-05 SDOH — HEALTH STABILITY: PHYSICAL HEALTH: ON AVERAGE, HOW MANY MINUTES DO YOU ENGAGE IN EXERCISE AT THIS LEVEL?: 60 MIN

## 2024-05-05 SDOH — HEALTH STABILITY: PHYSICAL HEALTH
ON AVERAGE, HOW MANY DAYS PER WEEK DO YOU ENGAGE IN MODERATE TO STRENUOUS EXERCISE (LIKE A BRISK WALK)?: PATIENT DECLINED

## 2024-05-05 SDOH — HEALTH STABILITY: PHYSICAL HEALTH: ON AVERAGE, HOW MANY DAYS PER WEEK DO YOU ENGAGE IN MODERATE TO STRENUOUS EXERCISE (LIKE A BRISK WALK)?: 3 DAYS

## 2024-05-05 ASSESSMENT — LIFESTYLE VARIABLES
HOW MANY STANDARD DRINKS CONTAINING ALCOHOL DO YOU HAVE ON A TYPICAL DAY: PATIENT DECLINED
SKIP TO QUESTIONS 9-10: 0
HOW OFTEN DO YOU HAVE SIX OR MORE DRINKS ON ONE OCCASION: PATIENT DECLINED
HOW OFTEN DO YOU HAVE A DRINK CONTAINING ALCOHOL: PATIENT DECLINED
AUDIT-C TOTAL SCORE: -1

## 2024-05-05 ASSESSMENT — SOCIAL DETERMINANTS OF HEALTH (SDOH)
HOW OFTEN DO YOU ATTEND CHURCH OR RELIGIOUS SERVICES?: PATIENT DECLINED
HOW OFTEN DO YOU GET TOGETHER WITH FRIENDS OR RELATIVES?: ONCE A WEEK
ARE YOU MARRIED, WIDOWED, DIVORCED, SEPARATED, NEVER MARRIED, OR LIVING WITH A PARTNER?: PATIENT DECLINED
HOW OFTEN DO YOU GET TOGETHER WITH FRIENDS OR RELATIVES?: PATIENT DECLINED
DO YOU BELONG TO ANY CLUBS OR ORGANIZATIONS SUCH AS CHURCH GROUPS UNIONS, FRATERNAL OR ATHLETIC GROUPS, OR SCHOOL GROUPS?: PATIENT DECLINED
HOW OFTEN DO YOU ATTENT MEETINGS OF THE CLUB OR ORGANIZATION YOU BELONG TO?: PATIENT DECLINED
IN A TYPICAL WEEK, HOW MANY TIMES DO YOU TALK ON THE PHONE WITH FAMILY, FRIENDS, OR NEIGHBORS?: PATIENT DECLINED

## 2024-05-07 ENCOUNTER — OFFICE VISIT (OUTPATIENT)
Dept: FAMILY MEDICINE | Facility: CLINIC | Age: 64
End: 2024-05-07
Payer: COMMERCIAL

## 2024-05-07 VITALS
DIASTOLIC BLOOD PRESSURE: 73 MMHG | WEIGHT: 176.5 LBS | BODY MASS INDEX: 24.71 KG/M2 | HEART RATE: 69 BPM | RESPIRATION RATE: 16 BRPM | TEMPERATURE: 97.9 F | SYSTOLIC BLOOD PRESSURE: 123 MMHG | OXYGEN SATURATION: 97 % | HEIGHT: 71 IN

## 2024-05-07 DIAGNOSIS — H02.61 XANTHELASMA OF RIGHT UPPER EYELID: ICD-10-CM

## 2024-05-07 DIAGNOSIS — R73.03 PREDIABETES: ICD-10-CM

## 2024-05-07 DIAGNOSIS — M75.102 ROTATOR CUFF SYNDROME, LEFT: ICD-10-CM

## 2024-05-07 DIAGNOSIS — G47.9 SLEEP DISORDER: ICD-10-CM

## 2024-05-07 DIAGNOSIS — Z29.11 NEED FOR VACCINATION AGAINST RESPIRATORY SYNCYTIAL VIRUS: ICD-10-CM

## 2024-05-07 DIAGNOSIS — L82.1 SEBORRHEIC KERATOSES: ICD-10-CM

## 2024-05-07 DIAGNOSIS — G62.9 NEUROPATHY: ICD-10-CM

## 2024-05-07 DIAGNOSIS — Z85.46 HISTORY OF PROSTATE CANCER: ICD-10-CM

## 2024-05-07 DIAGNOSIS — E78.5 HYPERLIPIDEMIA LDL GOAL <160: ICD-10-CM

## 2024-05-07 DIAGNOSIS — Z00.00 WELLNESS EXAMINATION: Primary | ICD-10-CM

## 2024-05-07 PROBLEM — C61 BIOCHEMICALLY RECURRENT MALIGNANT NEOPLASM OF PROSTATE (H): Status: RESOLVED | Noted: 2023-01-24 | Resolved: 2024-05-07

## 2024-05-07 PROBLEM — L30.9 DERMATITIS: Status: RESOLVED | Noted: 2019-02-12 | Resolved: 2024-05-07

## 2024-05-07 PROBLEM — R97.21 BIOCHEMICALLY RECURRENT MALIGNANT NEOPLASM OF PROSTATE (H): Status: RESOLVED | Noted: 2023-01-24 | Resolved: 2024-05-07

## 2024-05-07 PROBLEM — L72.3 SEBACEOUS CYST: Status: RESOLVED | Noted: 2019-02-12 | Resolved: 2024-05-07

## 2024-05-07 PROBLEM — R21 RASH: Status: RESOLVED | Noted: 2020-02-18 | Resolved: 2024-05-07

## 2024-05-07 PROCEDURE — 99207 PR INITIAL PREVENTIVE EXAM STAT: CPT | Performed by: FAMILY MEDICINE

## 2024-05-07 PROCEDURE — 90480 ADMN SARSCOV2 VAC 1/ONLY CMP: CPT | Performed by: FAMILY MEDICINE

## 2024-05-07 PROCEDURE — 91320 SARSCV2 VAC 30MCG TRS-SUC IM: CPT | Performed by: FAMILY MEDICINE

## 2024-05-07 PROCEDURE — G0009 ADMIN PNEUMOCOCCAL VACCINE: HCPCS | Performed by: FAMILY MEDICINE

## 2024-05-07 PROCEDURE — 90677 PCV20 VACCINE IM: CPT | Performed by: FAMILY MEDICINE

## 2024-05-07 PROCEDURE — 99214 OFFICE O/P EST MOD 30 MIN: CPT | Mod: 25 | Performed by: FAMILY MEDICINE

## 2024-05-07 RX ORDER — ROSUVASTATIN CALCIUM 40 MG/1
40 TABLET, COATED ORAL DAILY
Qty: 90 TABLET | Refills: 3 | Status: SHIPPED | OUTPATIENT
Start: 2024-05-07

## 2024-05-07 RX ORDER — GABAPENTIN 100 MG/1
100 CAPSULE ORAL 3 TIMES DAILY
Qty: 90 CAPSULE | Refills: 2 | Status: SHIPPED | OUTPATIENT
Start: 2024-05-07 | End: 2024-05-08

## 2024-05-07 RX ORDER — RESPIRATORY SYNCYTIAL VIRUS VACCINE 120MCG/0.5
0.5 KIT INTRAMUSCULAR ONCE
Qty: 1 EACH | Refills: 0 | Status: CANCELLED | OUTPATIENT
Start: 2024-05-07 | End: 2024-05-07

## 2024-05-07 ASSESSMENT — PAIN SCALES - GENERAL: PAINLEVEL: NO PAIN (0)

## 2024-05-07 NOTE — ASSESSMENT & PLAN NOTE
He had a elevation of his PSA with subsequent radiation therapy.  He has appointment with urology for follow-up in June.  We shall delay our lab tests until that lab draw

## 2024-05-07 NOTE — ASSESSMENT & PLAN NOTE
These not resolved with statin therapy.  They can be excised by either an ophthalmologist or his dermatologist

## 2024-05-07 NOTE — PROGRESS NOTES
Prior to immunization administration, verified patients identity using patient s name and date of birth. Please see Immunization Activity for additional information.     Screening Questionnaire for Adult Immunization    Are you sick today?   No   Do you have allergies to medications, food, a vaccine component or latex?   No   Have you ever had a serious reaction after receiving a vaccination?   No   Do you have a long-term health problem with heart, lung, kidney, or metabolic disease (e.g., diabetes), asthma, a blood disorder, no spleen, complement component deficiency, a cochlear implant, or a spinal fluid leak?  Are you on long-term aspirin therapy?   No   Do you have cancer, leukemia, HIV/AIDS, or any other immune system problem?   No   Do you have a parent, brother, or sister with an immune system problem?   No   In the past 3 months, have you taken medications that affect  your immune system, such as prednisone, other steroids, or anticancer drugs; drugs for the treatment of rheumatoid arthritis, Crohn s disease, or psoriasis; or have you had radiation treatments?   No   Have you had a seizure, or a brain or other nervous system problem?   No   During the past year, have you received a transfusion of blood or blood    products, or been given immune (gamma) globulin or antiviral drug?   No   For women: Are you pregnant or is there a chance you could become       pregnant during the next month?   No   Have you received any vaccinations in the past 4 weeks?   No     Immunization questionnaire answers were all negative.      Patient instructed to remain in clinic for 15 minutes afterwards, and to report any adverse reactions.     Screening performed by Shi Bermudez CMA on 5/7/2024 at 8:56 AM.

## 2024-05-07 NOTE — PROGRESS NOTES
Preventive Care Visit  [facility]  Kiel Torres MD, Family Medicine  May 7, 2024      Assessment & Plan   Problem List Items Addressed This Visit       History of prostate cancer     He had a elevation of his PSA with subsequent radiation therapy.  He has appointment with urology for follow-up in June.  We shall delay our lab tests until that lab draw         Hyperlipidemia LDL goal <160     Statin therapy.  Continue         Relevant Medications    rosuvastatin (CRESTOR) 40 MG tablet    Other Relevant Orders    COMPREHENSIVE METABOLIC PANEL    Lipid panel reflex to direct LDL Non-fasting    Comprehensive metabolic panel    Lipid panel reflex to direct LDL Fasting    Need for vaccination against respiratory syncytial virus     Discussed recommendations for this vaccine         Neuropathy     Neuropathy is now occasionally painful.  Discussed therapeutic options.  He is interested in gabapentin trial         Relevant Medications    gabapentin (NEURONTIN) 100 MG capsule    Prediabetes     Periodic A1c         Relevant Orders    HEMOGLOBIN A1C    Hemoglobin A1c    Rotator cuff syndrome, left     He had pain in his left shoulder and saw his orthopedist.  They recommended strengthening exercises which she is undertaken and his discomfort has resolved.  Discussed.  His orthopedist has suggested that surgery could be considered in future depending on symptoms         Seborrheic keratoses     He has a lesion which was itching on his left calf as well as some moles that he would like removed.  He wonders if he should have a skin check.  We shall refer         Relevant Orders    Adult Dermatology  Referral    Sleep disorder     Decades of sleeping 6 hours with occasional naps.  He is quite satisfied with this regimen.  Discussed physiologic sleep duration         Wellness examination - Primary    Xanthelasma of right upper eyelid     These not resolved with statin therapy.  They can be  excised by either an ophthalmologist or his dermatologist             Patient has been advised of split billing requirements and indicates understanding: Yes         Counseling  Appropriate preventive services were discussed with this patient, including applicable screening as appropriate for fall prevention, nutrition, physical activity, Tobacco-use cessation, weight loss and cognition.  Checklist reviewing preventive services available has been given to the patient.  Reviewed patient's diet, addressing concerns and/or questions.           Christina Calix is a 63 year old, presenting for the following:  Wellness Visit        5/7/2024     8:17 AM   Additional Questions   Roomed by Maria Guadalupe Cortez      Pt is fasting if labs are needed.    Health Care Directive  Patient does not have a Health Care Directive or Living Will:     HPI        Concern - Bumps on Calves  Onset: 1 month ago  Description: small raised bumps  Intensity: mild  Progression of Symptoms:  same and constant  Accompanying Signs & Symptoms: itching which has improved  Previous history of similar problem: none  Precipitating factors:        Worsened by: none  Alleviating factors:        Improved by: none  Therapies tried and outcome: None      5/5/2024   General Health   How would you rate your overall physical health? Good   Feel stress (tense, anxious, or unable to sleep) Not at all         5/5/2024   Nutrition   Diet: Low fat/cholesterol         5/5/2024   Exercise   Days per week of moderate/strenous exercise Patient declined    3 days   Average minutes spent exercising at this level Patient declined    60 min         5/5/2024   Social Factors   Frequency of gathering with friends or relatives Patient declined    Once a week   Worry food won't last until get money to buy more No    No   Food not last or not have enough money for food? No    No   Do you have housing?  Yes    Yes   Are you worried about losing your housing? No    No   Lack of  transportation? No    No   Unable to get utilities (heat,electricity)? No    No         5/6/2024   Fall Risk   Fallen 2 or more times in the past year? No   Trouble with walking or balance? No          5/5/2024   Activities of Daily Living- Home Safety   Needs help with the following daily activites None of the above   Safety concerns in the home None of the above         5/5/2024   Dental   Dentist two times every year? Yes         5/5/2024   Hearing Screening   Hearing concerns? None of the above         5/5/2024   Driving Risk Screening   Patient/family members have concerns about driving No         5/5/2024   General Alertness/Fatigue Screening   Have you been more tired than usual lately? No         5/5/2024   Urinary Incontinence Screening   Bothered by leaking urine in past 6 months No            Today's PHQ-2 Score:       5/6/2024     1:16 PM   PHQ-2 ( 1999 Pfizer)   Q1: Little interest or pleasure in doing things 0   Q2: Feeling down, depressed or hopeless 0   PHQ-2 Score 0   Q1: Little interest or pleasure in doing things Not at all   Q2: Feeling down, depressed or hopeless Not at all   PHQ-2 Score 0           5/5/2024   Substance Use   Alcohol more than 3/day or more than 7/wk No   Do you have a current opioid prescription? No   How severe/bad is pain from 1 to 10? 4/10   Do you use any other substances recreationally? (!) DECLINE     Social History     Tobacco Use    Smoking status: Never    Smokeless tobacco: Never   Vaping Use    Vaping status: Never Used   Substance Use Topics    Alcohol use: Yes     Comment: 7-10 week    Drug use: No           5/5/2024   AAA Screening   Family history of Abdominal Aortic Aneurysm (AAA)? No   Last PSA:   PSA   Date Value Ref Range Status   02/18/2021 0.04 0 - 4 ug/L Final     Comment:     Assay Method:  Chemiluminescence using Siemens Vista analyzer     PSA Tumor Marker   Date Value Ref Range Status   12/20/2023 <0.01 0.00 - 4.50 ng/mL Final   07/19/2022 0.07 0.00 -  4.00 ug/L Final     ASCVD Risk   The 10-year ASCVD risk score (Leslie BARNARD, et al., 2019) is: 16.1%    Values used to calculate the score:      Age: 63 years      Sex: Male      Is Non- : No      Diabetic: No      Tobacco smoker: No      Systolic Blood Pressure: 123 mmHg      Is BP treated: No      HDL Cholesterol: 41 mg/dL      Total Cholesterol: 264 mg/dL            Reviewed and updated as needed this visit by Provider                      Current providers sharing in care for this patient include:  Patient Care Team:  Kiel Torres MD as PCP - General (Family Medicine)  Fercho Serra MD as Assigned Surgical Provider  Kiel Torres MD as Assigned PCP  Fercho Serra MD as MD (Urology)    The following health maintenance items are reviewed in Epic and correct as of today:  Health Maintenance   Topic Date Due    RSV VACCINE (Pregnancy & 60+) (1 - 1-dose 60+ series) Never done    Pneumococcal Vaccine: 65+ Years (1 of 1 - PCV) Never done    MEDICARE ANNUAL WELLNESS VISIT  01/24/2024    A1C  01/24/2024    CMP  01/24/2024    LIPID  01/24/2024    ANNUAL REVIEW OF HM ORDERS  01/24/2024    COVID-19 Vaccine (6 - 2023-24 season) 02/09/2024    PSA  06/20/2024    FALL RISK ASSESSMENT  05/07/2025    ADVANCE CARE PLANNING  02/19/2026    COLORECTAL CANCER SCREENING  03/22/2028    DTAP/TDAP/TD IMMUNIZATION (2 - Td or Tdap) 02/12/2029    HEPATITIS C SCREENING  Completed    HIV SCREENING  Completed    PHQ-2 (once per calendar year)  Completed    ZOSTER IMMUNIZATION  Completed    IPV IMMUNIZATION  Aged Out    HPV IMMUNIZATION  Aged Out    MENINGITIS IMMUNIZATION  Aged Out    RSV MONOCLONAL ANTIBODY  Aged Out    INFLUENZA VACCINE  Discontinued    GLUCOSE  Discontinued         Review of Systems  Constitutional, neuro, ENT, endocrine, pulmonary, cardiac, gastrointestinal, genitourinary, musculoskeletal, integument and psychiatric systems are negative, except as otherwise noted.     Objective   "  Exam  /73 (BP Location: Right arm, Patient Position: Sitting, Cuff Size: Adult Regular)   Pulse 69   Temp 97.9  F (36.6  C) (Oral)   Resp 16   Ht 1.803 m (5' 11\")   Wt 80.1 kg (176 lb 8 oz)   SpO2 97%   BMI 24.62 kg/m     Estimated body mass index is 24.62 kg/m  as calculated from the following:    Height as of this encounter: 1.803 m (5' 11\").    Weight as of this encounter: 80.1 kg (176 lb 8 oz).    Physical Exam  Constitutional:       Appearance: Normal appearance.   HENT:      Head: Atraumatic.      Right Ear: Tympanic membrane normal.      Left Ear: Tympanic membrane normal.      Nose: Nose normal.      Mouth/Throat:      Mouth: Mucous membranes are moist.   Eyes:      Pupils: Pupils are equal, round, and reactive to light.   Cardiovascular:      Rate and Rhythm: Normal rate and regular rhythm.      Heart sounds: Normal heart sounds.   Pulmonary:      Effort: Pulmonary effort is normal.      Breath sounds: Normal breath sounds.   Abdominal:      General: Abdomen is flat. Bowel sounds are normal.      Palpations: Abdomen is soft.   Musculoskeletal:      Cervical back: Neck supple.   Skin:     Comments: Scattered typical seborrheic keratoses.  His left calf is homogenous papule with discrete edges he has a nevus in his left antecubital fossa that he would like removed raised homogenous   Neurological:      General: No focal deficit present.      Mental Status: He is alert.   Psychiatric:         Mood and Affect: Mood normal.       Are you using opioids?  No        5/7/2024   Mini Cog   Clock Draw Score 2 Normal   3 Item Recall 1 object recalled   Mini Cog Total Score 3         Vision Screen         Signed Electronically by: Kiel Torres MD    "

## 2024-05-07 NOTE — ASSESSMENT & PLAN NOTE
Decades of sleeping 6 hours with occasional naps.  He is quite satisfied with this regimen.  Discussed physiologic sleep duration

## 2024-05-07 NOTE — ASSESSMENT & PLAN NOTE
He has a lesion which was itching on his left calf as well as some moles that he would like removed.  He wonders if he should have a skin check.  We shall refer

## 2024-05-07 NOTE — ASSESSMENT & PLAN NOTE
Neuropathy is now occasionally painful.  Discussed therapeutic options.  He is interested in gabapentin trial

## 2024-05-07 NOTE — ASSESSMENT & PLAN NOTE
He had pain in his left shoulder and saw his orthopedist.  They recommended strengthening exercises which she is undertaken and his discomfort has resolved.  Discussed.  His orthopedist has suggested that surgery could be considered in future depending on symptoms

## 2024-05-08 ENCOUNTER — MYC MEDICAL ADVICE (OUTPATIENT)
Dept: FAMILY MEDICINE | Facility: CLINIC | Age: 64
End: 2024-05-08
Payer: COMMERCIAL

## 2024-05-08 DIAGNOSIS — G62.9 NEUROPATHY: ICD-10-CM

## 2024-05-08 RX ORDER — GABAPENTIN 100 MG/1
100 CAPSULE ORAL AT BEDTIME
Qty: 90 CAPSULE | Refills: 2 | Status: SHIPPED | OUTPATIENT
Start: 2024-05-08

## 2024-05-28 NOTE — PROGRESS NOTES
It doesn't appear it was done when he was roomed. Do you want him to come in for a nurse only vision screening since this was his welcome to medicare visit?

## 2024-05-30 ENCOUNTER — TELEPHONE (OUTPATIENT)
Dept: FAMILY MEDICINE | Facility: CLINIC | Age: 64
End: 2024-05-30
Payer: COMMERCIAL

## 2024-05-30 NOTE — TELEPHONE ENCOUNTER
Patient calls and states he just got a V/M to call Dr. Torres's office back.  No documentation found of message being left.  Irina Gaines RN

## 2024-06-19 ENCOUNTER — LAB (OUTPATIENT)
Dept: LAB | Facility: CLINIC | Age: 64
End: 2024-06-19
Payer: COMMERCIAL

## 2024-06-19 DIAGNOSIS — R97.21 BIOCHEMICALLY RECURRENT MALIGNANT NEOPLASM OF PROSTATE (H): ICD-10-CM

## 2024-06-19 DIAGNOSIS — Z12.5 SCREENING FOR PROSTATE CANCER: Primary | ICD-10-CM

## 2024-06-19 DIAGNOSIS — R73.03 PREDIABETES: ICD-10-CM

## 2024-06-19 DIAGNOSIS — C61 BIOCHEMICALLY RECURRENT MALIGNANT NEOPLASM OF PROSTATE (H): ICD-10-CM

## 2024-06-19 DIAGNOSIS — E78.5 HYPERLIPIDEMIA LDL GOAL <160: ICD-10-CM

## 2024-06-19 LAB
ALBUMIN SERPL BCG-MCNC: 4.6 G/DL (ref 3.5–5.2)
ALP SERPL-CCNC: 54 U/L (ref 40–150)
ALT SERPL W P-5'-P-CCNC: 27 U/L (ref 0–70)
ANION GAP SERPL CALCULATED.3IONS-SCNC: 10 MMOL/L (ref 7–15)
AST SERPL W P-5'-P-CCNC: 33 U/L (ref 0–45)
BILIRUB SERPL-MCNC: 0.7 MG/DL
BUN SERPL-MCNC: 17.7 MG/DL (ref 8–23)
CALCIUM SERPL-MCNC: 9.4 MG/DL (ref 8.8–10.2)
CHLORIDE SERPL-SCNC: 104 MMOL/L (ref 98–107)
CHOLEST SERPL-MCNC: 140 MG/DL
CREAT SERPL-MCNC: 0.95 MG/DL (ref 0.67–1.17)
DEPRECATED HCO3 PLAS-SCNC: 26 MMOL/L (ref 22–29)
EGFRCR SERPLBLD CKD-EPI 2021: 89 ML/MIN/1.73M2
FASTING STATUS PATIENT QL REPORTED: YES
FASTING STATUS PATIENT QL REPORTED: YES
GLUCOSE SERPL-MCNC: 106 MG/DL (ref 70–99)
HBA1C MFR BLD: 5.9 % (ref 0–5.6)
HDLC SERPL-MCNC: 41 MG/DL
LDLC SERPL CALC-MCNC: 73 MG/DL
NONHDLC SERPL-MCNC: 99 MG/DL
POTASSIUM SERPL-SCNC: 4.7 MMOL/L (ref 3.4–5.3)
PROT SERPL-MCNC: 6.9 G/DL (ref 6.4–8.3)
PSA SERPL DL<=0.01 NG/ML-MCNC: <0.01 NG/ML (ref 0–4.5)
PSA SERPL DL<=0.01 NG/ML-MCNC: <0.01 NG/ML (ref 0–4.5)
SODIUM SERPL-SCNC: 140 MMOL/L (ref 135–145)
TRIGL SERPL-MCNC: 128 MG/DL

## 2024-06-19 PROCEDURE — 84153 ASSAY OF PSA TOTAL: CPT

## 2024-06-19 PROCEDURE — 80053 COMPREHEN METABOLIC PANEL: CPT

## 2024-06-19 PROCEDURE — G0103 PSA SCREENING: HCPCS

## 2024-06-19 PROCEDURE — 36415 COLL VENOUS BLD VENIPUNCTURE: CPT

## 2024-06-19 PROCEDURE — 83036 HEMOGLOBIN GLYCOSYLATED A1C: CPT

## 2024-06-19 PROCEDURE — 80061 LIPID PANEL: CPT

## 2024-06-20 ENCOUNTER — VIRTUAL VISIT (OUTPATIENT)
Dept: UROLOGY | Facility: CLINIC | Age: 64
End: 2024-06-20
Payer: COMMERCIAL

## 2024-06-20 DIAGNOSIS — R97.21 BIOCHEMICALLY RECURRENT MALIGNANT NEOPLASM OF PROSTATE (H): Primary | ICD-10-CM

## 2024-06-20 DIAGNOSIS — C61 BIOCHEMICALLY RECURRENT MALIGNANT NEOPLASM OF PROSTATE (H): Primary | ICD-10-CM

## 2024-06-20 DIAGNOSIS — N52.31 ERECTILE DYSFUNCTION AFTER RADICAL PROSTATECTOMY: ICD-10-CM

## 2024-06-20 PROCEDURE — 99213 OFFICE O/P EST LOW 20 MIN: CPT | Mod: 95 | Performed by: STUDENT IN AN ORGANIZED HEALTH CARE EDUCATION/TRAINING PROGRAM

## 2024-06-20 NOTE — LETTER
6/20/2024       RE: Yogi Moreland  [address] [facility] [address]     Dear Colleague,    Thank you for referring your patient, Yogi Moreland, to the [facility] UROLOGY CLINIC [place] at [facility]. Please see a copy of my visit note below.    CHIEF COMPLAINT   Yogi Moreland who is a 63 year old male returns today for follow-up of prostate cancer s/p RRP + BPLND 7/11/2017 (iPSA 7.46 ng/ml, [place] 3+4=7 [id number], negative margins) with biochemical recurrence s/p salvage RT completed 3/27/2023 (Zackery), erectile dysfunction     HPI   Yogi Moreland is a 63 year old male returns today for follow-up of prostate cancer s/p RRP + BPLND 7/11/2017 (iPSA 7.46 ng/ml, [place] 3+4=7 [id number], negative margins) with biochemical recurrence s/p salvage RT completed 3/27/2023 (Zackery), erectile dysfunction     Denies any issues since last visit    ED doing well on tadalafil yuly and sildenafil prn    PHYSICAL EXAM  Patient is a 63 year old  male   Vitals: There were no vitals taken for this visit.  There is no height or weight on file to calculate BMI.  General Appearance Adult:   Alert, no acute distress, oriented  HENT: throat/mouth:normal, good dentition  Lungs: no respiratory distress, or pursed lip breathing  Heart: No obvious jugular venous distension present  Abdomen: nondistended  Musculoskeltal: extremities normal, no peripheral edema  Skin: no suspicious lesions or rashes  Neuro: Alert, oriented, speech and mentation normal  Psych: affect and mood normal  Gait: Normal    Component      Latest Ref Rng 6/19/2024  9:08 AM   PSA Tumor Marker      0.00 - 4.50 ng/mL <0.01          ASSESSMENT and PLAN  63 year old male returns today for follow-up of prostate cancer s/p RRP + BPLND 7/11/2017 (iPSA 7.46 ng/ml, [place] 3+4=7 [id number], negative margins) with biochemical recurrence s/p salvage RT completed 3/27/2023 (Zackery), erectile dysfunction     Re:  prostate cancer, PSA remains undetectable after salvage radiation therapy. Will plan to go to annual PSA checks at this point    Re: ED: doing well on current meds    - return 1 year with PSA prior    Fercho Serra MD   [facility] Urology  [facility] Phone: [phone]        Virtual Visit Details    Type of service:  Video Visit   Video Start Time: 9:14AM  Video End Time:9:18AM    Originating Location (pt. Location): Home    Distant Location (provider location):  On-site  Platform used for Video Visit: Love

## 2024-06-20 NOTE — PROGRESS NOTES
CHIEF COMPLAINT   Yogi Moreland who is a 63 year old male returns today for follow-up of prostate cancer s/p RRP + BPLND 7/11/2017 (iPSA 7.46 ng/ml, Rye 3+4=7 [id number], negative margins) with biochemical recurrence s/p salvage RT completed 3/27/2023 (Zackery), erectile dysfunction     HPI   Yogi Moreland is a 63 year old male returns today for follow-up of prostate cancer s/p RRP + BPLND 7/11/2017 (iPSA 7.46 ng/ml, Rye 3+4=7 [id number], negative margins) with biochemical recurrence s/p salvage RT completed 3/27/2023 (Zackery), erectile dysfunction     Denies any issues since last visit    ED doing well on tadalafil yuly and sildenafil prn    PHYSICAL EXAM  Patient is a 63 year old  male   Vitals: There were no vitals taken for this visit.  There is no height or weight on file to calculate BMI.  General Appearance Adult:   Alert, no acute distress, oriented  HENT: throat/mouth:normal, good dentition  Lungs: no respiratory distress, or pursed lip breathing  Heart: No obvious jugular venous distension present  Abdomen: nondistended  Musculoskeltal: extremities normal, no peripheral edema  Skin: no suspicious lesions or rashes  Neuro: Alert, oriented, speech and mentation normal  Psych: affect and mood normal  Gait: Normal    Component      Latest Ref Rng 6/19/2024  9:08 AM   PSA Tumor Marker      0.00 - 4.50 ng/mL <0.01          ASSESSMENT and PLAN  63 year old male returns today for follow-up of prostate cancer s/p RRP + BPLND 7/11/2017 (iPSA 7.46 ng/ml, Rye 3+4=7 [id number], negative margins) with biochemical recurrence s/p salvage RT completed 3/27/2023 (Zackery), erectile dysfunction     Re: prostate cancer, PSA remains undetectable after salvage radiation therapy. Will plan to go to annual PSA checks at this point    Re: ED: doing well on current meds    - return 1 year with PSA prior    Fercho Serra MD   [facility]  [facility] Phone: [phone]        Virtual Visit  Details    Type of service:  Video Visit   Video Start Time: 9:14AM  Video End Time:9:18AM    Originating Location (pt. Location): Home    Distant Location (provider location):  On-site  Platform used for Video Visit: Love

## 2024-06-20 NOTE — NURSING NOTE
Is the patient currently in the state of [place]? YES    Visit mode:VIDEO    If the visit is dropped, the patient can be reconnected by: VIDEO VISIT: Text to cell phone:   Telephone Information:   Mobile [phone]       Will anyone else be joining the visit? NO  (If patient encounters technical issues they should call [phone]    How would you like to obtain your AVS? MyChart    Are changes needed to the allergy or medication list? No    Are refills needed on medications prescribed by this physician? NO    Reason for visit: RECHECK    Chrystal KAMARA

## 2024-07-30 NOTE — PATIENT INSTRUCTIONS
Before Your Surgery      Call your surgeon if there is any change in your health. This includes signs of a cold or flu (such as a sore throat, runny nose, cough, rash or fever).    Do not smoke, drink alcohol or take over the counter medicine (unless your surgeon or primary care doctor tells you to) for the 24 hours before and after surgery.    If you take prescribed drugs: Follow your doctor s orders about which medicines to take and which to stop until after surgery.    Eating and drinking prior to surgery: follow the instructions from your surgeon    Take a shower or bath the night before surgery. Use the soap your surgeon gave you to gently clean your skin. If you do not have soap from your surgeon, use your regular soap. Do not shave or scrub the surgery site.  Wear clean pajamas and have clean sheets on your bed.    Set up a BMP CBC TSH A1C LIPID

## 2024-09-11 ENCOUNTER — TELEPHONE (OUTPATIENT)
Facility: CLINIC | Age: 64
End: 2024-09-11

## 2024-09-11 DIAGNOSIS — N52.31 ERECTILE DYSFUNCTION AFTER RADICAL PROSTATECTOMY: ICD-10-CM

## 2024-09-11 RX ORDER — SILDENAFIL CITRATE 20 MG/1
TABLET ORAL
Qty: 30 TABLET | Refills: 11 | Status: SHIPPED | OUTPATIENT
Start: 2024-09-11

## 2024-09-11 NOTE — TELEPHONE ENCOUNTER
[facility] Prior Authorization Team   Phone: [phone]    PRIOR AUTHORIZATION DENIED    Medication: SILDENAFIL CITRATE 20 MG PO TABS  Insurance Company: [employer] [place] - Phone [phone] Fax [phone]  Denial Date: 9/11/2024  Denial Reason(s): REQUIRES AN FDA APPROVED DX      Appeal Information: IF THE PROVIDER WOULD LIKE TO APPEAL THIS DECISION PLEASE PROVIDE THE PA TEAM WITH A LETTER OF MEDICAL NECESSITY      Patient Notified: NO

## 2024-10-22 ENCOUNTER — OFFICE VISIT (OUTPATIENT)
Dept: FAMILY MEDICINE | Facility: CLINIC | Age: 64
End: 2024-10-22
Payer: COMMERCIAL

## 2024-10-22 VITALS
HEIGHT: 71 IN | WEIGHT: 179.7 LBS | RESPIRATION RATE: 17 BRPM | DIASTOLIC BLOOD PRESSURE: 81 MMHG | SYSTOLIC BLOOD PRESSURE: 122 MMHG | HEART RATE: 67 BPM | TEMPERATURE: 97.8 F | OXYGEN SATURATION: 98 % | BODY MASS INDEX: 25.16 KG/M2

## 2024-10-22 DIAGNOSIS — K52.9 ENTERITIS: Primary | ICD-10-CM

## 2024-10-22 PROCEDURE — 99213 OFFICE O/P EST LOW 20 MIN: CPT | Performed by: FAMILY MEDICINE

## 2024-10-22 ASSESSMENT — ENCOUNTER SYMPTOMS: DIARRHEA: 1

## 2024-10-22 NOTE — PROGRESS NOTES
"  Assessment and Plan    (K52.9) Enteritis  (primary encounter diagnosis)  Comment: Seems to be resolving,  Plan: Observe, if still ill in a week will arrange for stool and blood testing.      RTC in  CPE    Yuri Masterson MD     Christina Calix is a 64 year old, presenting for the following health issues:  Establish Care, Abdominal Pain, and Diarrhea        10/22/2024     8:33 AM   Additional Questions   Roomed by Danyelle SALAS   Accompanied by self         10/22/2024     8:33 AM   Patient Reported Additional Medications   Patient reports taking the following new medications n/a     History of Present Illness       Reason for visit:  Stomach pain - diarrhea  Symptom onset:  1-2 weeks ago  Symptoms include:  Recurring stomach pain - diarrhea  Symptom intensity:  Mild  Symptom progression:  Staying the same  Had these symptoms before:  No  What makes it worse:  No  What makes it better:  No   He is taking medications regularly.     Got flu/covid about two weeks ago, felt that he developed some constipation for a few days and then developed diarrhea.  Liquid diarrhea for the last 1-1/2 weeks or so, although firmer stools last night and this morning.  Does use metamucil daily.  H/o prostate cancer, s/p rad tx ~2y ago.  Having some abd discomfort, no fever, n/v, no rosalinda blood or melena.  Continued to use metamucil through this, did try imodium.      [place]wash.      Objective    /81 (BP Location: Right arm, Patient Position: Sitting, Cuff Size: Adult Large)   Pulse 67   Temp 97.8  F (36.6  C) (Oral)   Resp 17   Ht 1.803 m (5' 11\")   Wt 81.5 kg (179 lb 11.2 oz)   SpO2 98%   BMI 25.06 kg/m    Body mass index is 25.06 kg/m .  Physical Exam  Vitals and nursing note reviewed.   HENT:      Head: Normocephalic.   Eyes:      Conjunctiva/sclera: Conjunctivae normal.   Cardiovascular:      Rate and Rhythm: Normal rate and regular rhythm.      Heart sounds: " Normal heart sounds.   Pulmonary:      Effort: Pulmonary effort is normal.      Breath sounds: Normal breath sounds.   Abdominal:      General: Bowel sounds are normal.      Palpations: Abdomen is soft.      Tenderness: There is no abdominal tenderness.   Skin:     General: Skin is warm and dry.   Neurological:      General: No focal deficit present.      Mental Status: He is alert and oriented to person, place, and time.   Psychiatric:         Mood and Affect: Mood normal.         Behavior: Behavior normal.              Signed Electronically by: Yuri Masterson MD

## 2024-11-04 ENCOUNTER — MYC REFILL (OUTPATIENT)
Dept: FAMILY MEDICINE | Facility: CLINIC | Age: 64
End: 2024-11-04
Payer: COMMERCIAL

## 2024-11-04 DIAGNOSIS — E78.5 HYPERLIPIDEMIA LDL GOAL <160: ICD-10-CM

## 2024-11-04 RX ORDER — ROSUVASTATIN CALCIUM 40 MG/1
40 TABLET, COATED ORAL DAILY
Qty: 90 TABLET | Refills: 3 | OUTPATIENT
Start: 2024-11-04

## 2024-11-15 ENCOUNTER — TELEPHONE (OUTPATIENT)
Dept: FAMILY MEDICINE | Facility: CLINIC | Age: 64
End: 2024-11-15
Payer: COMMERCIAL

## 2024-11-15 NOTE — TELEPHONE ENCOUNTER
Patient Quality Outreach    Patient is due for the following:   There are no preventive care reminders to display for this patient.    Action(s) Taken:   Schedule a nurse only visit for vaccines    Type of outreach:    Chart review performed, no outreach needed. Flu vaccine fgound on care everywhere    Questions for provider review:    None           So Mccarthy, CMA

## 2025-01-09 ENCOUNTER — OFFICE VISIT (OUTPATIENT)
Dept: DERMATOLOGY | Facility: CLINIC | Age: 65
End: 2025-01-09
Attending: FAMILY MEDICINE
Payer: COMMERCIAL

## 2025-01-09 DIAGNOSIS — L82.0 INFLAMED SEBORRHEIC KERATOSIS: ICD-10-CM

## 2025-01-09 DIAGNOSIS — D48.5 NEOPLASM OF UNCERTAIN BEHAVIOR OF SKIN: ICD-10-CM

## 2025-01-09 DIAGNOSIS — H02.61 XANTHELASMA OF RIGHT UPPER EYELID: ICD-10-CM

## 2025-01-09 DIAGNOSIS — D18.01 ANGIOMA OF SKIN: ICD-10-CM

## 2025-01-09 DIAGNOSIS — L81.4 LENTIGO: ICD-10-CM

## 2025-01-09 DIAGNOSIS — D23.9 DERMAL NEVUS: Primary | ICD-10-CM

## 2025-01-09 DIAGNOSIS — L82.1 SEBORRHEIC KERATOSES: ICD-10-CM

## 2025-01-09 NOTE — PROGRESS NOTES
Yogi Moreland , a 64 year old year old male patient, I was asked to see by Dr. Torres for spots on skin, he notes growing spot impinging vision on right eyelid and tender spot on left arm and neck.  Patient has no other skin complaints today.  Remainder of the HPI, Meds, PMH, Allergies, FH, and SH was reviewed in chart.      Past Medical History:   Diagnosis Date    Colon polyp     Colonic diverticular abscess     Dermatitis 02/12/2019    Elevated LFTs 08/2016    Elevated PSA     Enlarged prostate     Herpes zoster without complication 03/05/2019    History of prostate cancer 04/06/2017    Hyperlipidemia     Neuropathy 02/12/2019    Prediabetes 01/23/2013    Prostate cancer (H)     Rotator cuff syndrome, left     S/P colostomy takedown 12/27/2016    Sebaceous cyst 02/12/2019    Sleep disorder 05/07/2024    Xanthelasma of right upper eyelid 05/07/2024       Past Surgical History:   Procedure Laterality Date    APPENDECTOMY OPEN N/A 09/26/2016    Procedure: APPENDECTOMY OPEN;  Surgeon: Ramón Masters MD;  Location:  OR    BIOPSY  2/17/17    prostate biopsy    COLECTOMY WITH COLOSTOMY, COMBINED N/A 09/26/2016    Procedure: COMBINED COLECTOMY WITH COLOSTOMY;  Surgeon: Ramón Masters MD;  Location:  OR    COLONOSCOPY  2009    COLONOSCOPY  02/2013    COLONOSCOPY N/A 03/22/2018    Procedure: COLONOSCOPY;  COLONOSCOPY;  Surgeon: Chris Barrios MD;  Location:  GI    EXCISE MASS BACK N/A 07/14/2020    Procedure: EXCISION BACK CYST;  Surgeon: Ramón Masters MD;  Location:  OR    GENITOURINARY SURGERY  7/11/2017    Prostatectomy    HERNIORRHAPHY INGUINAL Left 12/15/2017    Procedure: HERNIORRHAPHY INGUINAL;  Left Inguinal Herniorrhaphy with Mesh;  Surgeon: Ramón Masters MD;  Location:  OR    HERNIORRHAPHY INGUINAL Right 07/14/2020    Procedure: OPEN RIGHT INGUINAL HERNIA REPAIR WITH MESH;  Surgeon: Ramón Masters MD;  Location:  OR    LAPAROSCOPIC ASSISTED COLOSTOMY TAKEDOWN N/A 12/27/2016     Procedure: LAPAROSCOPIC ASSISTED COLOSTOMY TAKEDOWN;  Surgeon: Ramón Masters MD;  Location: [facility] OR    LAPAROTOMY EXPLORATORY N/A 09/26/2016    Procedure: LAPAROTOMY EXPLORATORY;  Surgeon: Ramón Masters MD;  Location: [facility] OR    PROSTATECTOMY RETROPUBIC RADICAL N/A 07/11/2017    Procedure: PROSTATECTOMY RETROPUBIC RADICAL;  Retropubic Radical Prostectectomy, Bilateral pelvic Lymph Node Dissection ;  Surgeon: Chuy Reddy MD;  Location: [facility] OR        Family History   Problem Relation Age of Onset    Cancer Mother         metastatic    Other Cancer Mother     Cancer Father         melanoma     Other Cancer Father     Colon Cancer No family hx of        Social History     Socioeconomic History    Marital status:      Spouse name: Not on file    Number of children: Not on file    Years of education: Not on file    Highest education level: Not on file   Occupational History    Occupation:      Employer: [employer]   Tobacco Use    Smoking status: Never    Smokeless tobacco: Never   Vaping Use    Vaping status: Never Used   Substance and Sexual Activity    Alcohol use: Yes     Comment: 7-10 week    Drug use: No    Sexual activity: Yes     Partners: Female     Birth control/protection: None   Other Topics Concern    Parent/sibling w/ CABG, MI or angioplasty before 65F 55M? No   Social History Narrative    Not on file     Social Drivers of Health     Financial Resource Strain: Low Risk  (5/5/2024)    Financial Resource Strain     Within the past 12 months, have you or your family members you live with been unable to get utilities (heat, electricity) when it was really needed?: No   Food Insecurity: Low Risk  (5/5/2024)    Food Insecurity     Within the past 12 months, did you worry that your food would run out before you got money to buy more?: No     Within the past 12 months, did the food you bought just not last and you didn t have money to get more?: No   Transportation Needs: Low Risk   (5/5/2024)    Transportation Needs     Within the past 12 months, has lack of transportation kept you from medical appointments, getting your medicines, non-medical meetings or appointments, work, or from getting things that you need?: No   Physical Activity: Patient Declined (5/5/2024)    Exercise Vital Sign     Days of Exercise per Week: Patient declined     Minutes of Exercise per Session: Patient declined   Stress: No Stress Concern Present (5/5/2024)    [place] [employer] [place] [employer] - Occupational Stress Questionnaire     Feeling of Stress : Not at all   Social Connections: Patient Declined (5/5/2024)    Social Connection and Isolation Panel [NHANES]     Frequency of Communication with Friends and Family: Patient declined     Frequency of Social Gatherings with Friends and Family: Patient declined     Attends Rastafari Services: Patient declined     Active Member of Clubs or Organizations: Patient declined     Attends Club or Organization Meetings: Patient declined     Marital Status: Patient declined   Interpersonal Safety: Low Risk  (5/7/2024)    Interpersonal Safety     Do you feel physically and emotionally safe where you currently live?: Yes     Within the past 12 months, have you been hit, slapped, kicked or otherwise physically hurt by someone?: No     Within the past 12 months, have you been humiliated or emotionally abused in other ways by your partner or ex-partner?: No   Housing Stability: Low Risk  (5/5/2024)    Housing Stability     Do you have housing? : Yes     Are you worried about losing your housing?: No       Outpatient Encounter Medications as of 1/9/2025   Medication Sig Dispense Refill    COMPOUNDED NON-CONTROLLED SUBSTANCE (CMPD RX) - PHARMACY TO MIX COMPOUNDED MEDICATION Tadalafil trouche 17.5 mg, #30 w/ 3 refills. Dissolve one between cheek and gum prior to sexual intercourse, PRN. Not to exceed one in 24 hours 30 lozenge 3    gabapentin (NEURONTIN) 100 MG capsule Take 1  capsule (100 mg) by mouth at bedtime 90 capsule 2    rosuvastatin (CRESTOR) 40 MG tablet Take 1 tablet (40 mg) by mouth daily 90 tablet 3    sildenafil (REVATIO) 20 MG tablet Take  40 mg (2 tablets) to 100 mg (5 tablets) prn  as needed prior to sexual activity . Do not exceed 100 mg in a  24 hour period 30 tablet 11     No facility-administered encounter medications on file as of 1/9/2025.             Review Of Systems  Skin: As above  Eyes: negative  Ears/Nose/Throat: negative  Respiratory: No shortness of breath, dyspnea on exertion, cough, or hemoptysis  Cardiovascular: negative  Gastrointestinal: negative  Genitourinary: negative  Musculoskeletal: negative  Neurologic: negative  Psychiatric: negative  Hematologic/Lymphatic/Immunologic: negative  Endocrine: negative      O:   NAD, WDWN, Alert & Oriented, Mood & Affect wnl, Vitals stable   General appearance liz ii   Vitals stable   Alert, oriented and in no acute distress   Yellow plaque on right upper lid  L arm fubbery papule  Inflamed seborrheic keratosis on face and neck    Stuck on papules and brown macules on trunk and ext    Red papules on trunk   Flesh colored papules on trunk          Eyes: Conjunctivae/lids:Normal     ENT: Lips, mucosa: normal    MSK:Normal    Cardiovascular: peripheral edema none    Pulm: Breathing Normal    Neuro/Psych: Orientation:Normal; Mood/Affect:Normal      A/P:  1. Seborrheic keratosis, lentigo, angioma, dermal nevus  2. Inflamed seborrheic keratosis   Face x1, neck x2  3. L arm r/o NF  TANGENTIAL BIOPSY SENT OUT:  After consent, anesthesia with LEC and prep, tangential excision performed and specimen sent out for permanent section histology.  No complications and routine wound care. Patient told to call our office in 1-2 weeks for result.       4. Xanthelasma   Excision discussed with patient   Schedule   It was a pleasure speaking to Yogi Moreland today.  Previous clinic  notes and pertinent laboratory tests were reviewed  prior to Yogi Moreland's visit.  Signs and Symptoms of skin cancer discussed with patient.  Patient encouraged to perform monthly skin exams.  UV precautions reviewed with patient.  Return to clinic next appt

## 2025-01-09 NOTE — LETTER
1/9/2025      Yogi Moreland  [address] [address]      Dear Colleague,    Thank you for referring your patient, Yogi Moreland, to the [facility]. Please see a copy of my visit note below.    Yogi Moreland , a 64 year old year old male patient, I was asked to see by Dr. Torres for spots on skin, he notes growing spot impinging vision on right eyelid and tender spot on left arm and neck.  Patient has no other skin complaints today.  Remainder of the HPI, Meds, PMH, Allergies, FH, and SH was reviewed in chart.      Past Medical History:   Diagnosis Date     Colon polyp      Colonic diverticular abscess      Dermatitis 02/12/2019     Elevated LFTs 08/2016     Elevated PSA      Enlarged prostate      Herpes zoster without complication 03/05/2019     History of prostate cancer 04/06/2017     Hyperlipidemia      Neuropathy 02/12/2019     Prediabetes 01/23/2013     Prostate cancer (H)      Rotator cuff syndrome, left      S/P colostomy takedown 12/27/2016     Sebaceous cyst 02/12/2019     Sleep disorder 05/07/2024     Xanthelasma of right upper eyelid 05/07/2024       Past Surgical History:   Procedure Laterality Date     APPENDECTOMY OPEN N/A 09/26/2016    Procedure: APPENDECTOMY OPEN;  Surgeon: Ramón Masters MD;  Location:  OR     BIOPSY  2/17/17    prostate biopsy     COLECTOMY WITH COLOSTOMY, COMBINED N/A 09/26/2016    Procedure: COMBINED COLECTOMY WITH COLOSTOMY;  Surgeon: Ramón Masters MD;  Location:  OR     COLONOSCOPY  2009     COLONOSCOPY  02/2013     COLONOSCOPY N/A 03/22/2018    Procedure: COLONOSCOPY;  COLONOSCOPY;  Surgeon: Chris Barrios MD;  Location:  GI     EXCISE MASS BACK N/A 07/14/2020    Procedure: EXCISION BACK CYST;  Surgeon: Ramón Masters MD;  Location:  OR     GENITOURINARY SURGERY  7/11/2017    Prostatectomy     HERNIORRHAPHY INGUINAL Left 12/15/2017    Procedure: HERNIORRHAPHY INGUINAL;  Left Inguinal Herniorrhaphy  with Mesh;  Surgeon: Ramón Masters MD;  Location: [facility] OR     HERNIORRHAPHY INGUINAL Right 07/14/2020    Procedure: OPEN RIGHT INGUINAL HERNIA REPAIR WITH MESH;  Surgeon: Ramón Masters MD;  Location: [facility] OR     LAPAROSCOPIC ASSISTED COLOSTOMY TAKEDOWN N/A 12/27/2016    Procedure: LAPAROSCOPIC ASSISTED COLOSTOMY TAKEDOWN;  Surgeon: Ramón Masters MD;  Location: [facility] OR     LAPAROTOMY EXPLORATORY N/A 09/26/2016    Procedure: LAPAROTOMY EXPLORATORY;  Surgeon: Ramón Masters MD;  Location: [facility] OR     PROSTATECTOMY RETROPUBIC RADICAL N/A 07/11/2017    Procedure: PROSTATECTOMY RETROPUBIC RADICAL;  Retropubic Radical Prostectectomy, Bilateral pelvic Lymph Node Dissection ;  Surgeon: Chuy Reddy MD;  Location: [facility] OR        Family History   Problem Relation Age of Onset     Cancer Mother         metastatic     Other Cancer Mother      Cancer Father         melanoma      Other Cancer Father      Colon Cancer No family hx of        Social History     Socioeconomic History     Marital status:      Spouse name: Not on file     Number of children: Not on file     Years of education: Not on file     Highest education level: Not on file   Occupational History     Occupation:      Employer: [email or web address]   Tobacco Use     Smoking status: Never     Smokeless tobacco: Never   Vaping Use     Vaping status: Never Used   Substance and Sexual Activity     Alcohol use: Yes     Comment: 7-10 week     Drug use: No     Sexual activity: Yes     Partners: Female     Birth control/protection: None   Other Topics Concern     Parent/sibling w/ CABG, MI or angioplasty before 65F 55M? No   Social History Narrative     Not on file     Social Drivers of Health     Financial Resource Strain: Low Risk  (5/5/2024)    Financial Resource Strain      Within the past 12 months, have you or your family members you live with been unable to get utilities (heat, electricity) when it was really needed?: No   Food Insecurity:  Low Risk  (5/5/2024)    Food Insecurity      Within the past 12 months, did you worry that your food would run out before you got money to buy more?: No      Within the past 12 months, did the food you bought just not last and you didn t have money to get more?: No   Transportation Needs: Low Risk  (5/5/2024)    Transportation Needs      Within the past 12 months, has lack of transportation kept you from medical appointments, getting your medicines, non-medical meetings or appointments, work, or from getting things that you need?: No   Physical Activity: Patient Declined (5/5/2024)    Exercise Vital Sign      Days of Exercise per Week: Patient declined      Minutes of Exercise per Session: Patient declined   Stress: No Stress Concern Present (5/5/2024)    [employer] [place] [employer] - Occupational Stress Questionnaire      Feeling of Stress : Not at all   Social Connections: Patient Declined (5/5/2024)    Social Connection and Isolation Panel [NHANES]      Frequency of Communication with Friends and Family: Patient declined      Frequency of Social Gatherings with Friends and Family: Patient declined      Attends Jehovah's witness Services: Patient declined      Active Member of Clubs or Organizations: Patient declined      Attends Club or Organization Meetings: Patient declined      Marital Status: Patient declined   Interpersonal Safety: Low Risk  (5/7/2024)    Interpersonal Safety      Do you feel physically and emotionally safe where you currently live?: Yes      Within the past 12 months, have you been hit, slapped, kicked or otherwise physically hurt by someone?: No      Within the past 12 months, have you been humiliated or emotionally abused in other ways by your partner or ex-partner?: No   Housing Stability: Low Risk  (5/5/2024)    Housing Stability      Do you have housing? : Yes      Are you worried about losing your housing?: No       Outpatient Encounter Medications as of 1/9/2025   Medication Sig  Dispense Refill     COMPOUNDED NON-CONTROLLED SUBSTANCE (CMPD RX) - PHARMACY TO MIX COMPOUNDED MEDICATION Tadalafil trouche 17.5 mg, #30 w/ 3 refills. Dissolve one between cheek and gum prior to sexual intercourse, PRN. Not to exceed one in 24 hours 30 lozenge 3     gabapentin (NEURONTIN) 100 MG capsule Take 1 capsule (100 mg) by mouth at bedtime 90 capsule 2     rosuvastatin (CRESTOR) 40 MG tablet Take 1 tablet (40 mg) by mouth daily 90 tablet 3     sildenafil (REVATIO) 20 MG tablet Take  40 mg (2 tablets) to 100 mg (5 tablets) prn  as needed prior to sexual activity . Do not exceed 100 mg in a  24 hour period 30 tablet 11     No facility-administered encounter medications on file as of 1/9/2025.             Review Of Systems  Skin: As above  Eyes: negative  Ears/Nose/Throat: negative  Respiratory: No shortness of breath, dyspnea on exertion, cough, or hemoptysis  Cardiovascular: negative  Gastrointestinal: negative  Genitourinary: negative  Musculoskeletal: negative  Neurologic: negative  Psychiatric: negative  Hematologic/Lymphatic/Immunologic: negative  Endocrine: negative      O:   NAD, WDWN, Alert & Oriented, Mood & Affect wnl, Vitals stable   General appearance liz ii   Vitals stable   Alert, oriented and in no acute distress   Yellow plaque on right upper lid  L arm fubbery papule  Inflamed seborrheic keratosis on face and neck    Stuck on papules and brown macules on trunk and ext    Red papules on trunk   Flesh colored papules on trunk          Eyes: Conjunctivae/lids:Normal     ENT: Lips, mucosa: normal    MSK:Normal    Cardiovascular: peripheral edema none    Pulm: Breathing Normal    Neuro/Psych: Orientation:Normal; Mood/Affect:Normal      A/P:  1. Seborrheic keratosis, lentigo, angioma, dermal nevus  2. Inflamed seborrheic keratosis   Face x1, neck x2  3. L arm r/o NF  TANGENTIAL BIOPSY SENT OUT:  After consent, anesthesia with LEC and prep, tangential excision performed and specimen sent out for  permanent section histology.  No complications and routine wound care. Patient told to call our office in 1-2 weeks for result.       4. Xanthelasma   Excision discussed with patient   Schedule   It was a pleasure speaking to Yogi Moreland today.  Previous clinic  notes and pertinent laboratory tests were reviewed prior to Yogi Moreland's visit.  Signs and Symptoms of skin cancer discussed with patient.  Patient encouraged to perform monthly skin exams.  UV precautions reviewed with patient.  Return to clinic next appt      Again, thank you for allowing me to participate in the care of your patient.        Sincerely,        Avinash Whitman MD    Electronically signed

## 2025-01-09 NOTE — PATIENT INSTRUCTIONS
Spot on eye removal [place] March 17th or 19th or last week in march or sometime in April    Wound Care Instructions     FOR SUPERFICIAL WOUNDS     [facility] [phone]    [facility] [phone]                       AFTER 24 HOURS YOU SHOULD REMOVE THE BANDAGE AND BEGIN DAILY DRESSING CHANGES AS FOLLOWS:     1) Remove Dressing.     2) Clean and dry the area with tap water using a Q-tip or sterile gauze pad.     3) Apply Vaseline, Aquaphor, Polysporin ointment or Bacitracin ointment over entire wound.  Do NOT use Neosporin ointment.     4) Cover the wound with a band-aid, or a sterile non-stick gauze pad and micropore paper tape      REPEAT THESE INSTRUCTIONS AT LEAST ONCE A DAY UNTIL THE WOUND HAS COMPLETELY HEALED.    It is an old wives tale that a wound heals better when it is exposed to air and allowed to dry out. The wound will heal faster with a better cosmetic result if it is kept moist with ointment and covered with a bandage.    **Do not let the wound dry out.**      Supplies Needed:      *Cotton tipped applicators (Q-tips)    *Polysporin Ointment or Bacitracin Ointment (NOT NEOSPORIN)    *Band-aids or non-stick gauze pads and micropore paper tape.      PATIENT INFORMATION:    During the healing process you will notice a number of changes. All wounds develop a small halo of redness surrounding the wound.  This means healing is occurring. Severe itching with extensive redness usually indicates sensitivity to the ointment or bandage tape used to dress the wound.  You should call our office if this develops.      Swelling  and/or discoloration around your surgical site is common, particularly when performed around the eye.    All wounds normally drain.  The larger the wound the more drainage there will be.  After 7-10 days, you will notice the wound beginning to shrink and new skin will begin to grow.  The wound is healed when you can see skin has formed over the entire area.   A healed wound has a healthy, shiny look to the surface and is red to dark pink in color to normalize.  Wounds may take approximately 4-6 weeks to heal.  Larger wounds may take 6-8 weeks.  After the wound is healed you may discontinue dressing changes.    You may experience a sensation of tightness as your wound heals. This is normal and will gradually subside.    Your healed wound may be sensitive to temperature changes. This sensitivity improves with time, but if you re having a lot of discomfort, try to avoid temperature extremes.    Patients frequently experience itching after their wound appears to have healed because of the continue healing under the skin.  Plain Vaseline will help relieve the itching.        POSSIBLE COMPLICATIONS    BLEEDING:    Leave the bandage in place.  Use tightly rolled up gauze or a cloth to apply direct pressure over the bandage for 30  minutes.  Reapply pressure for an additional 30 minutes if necessary  Use additional gauze and tape to maintain pressure once the bleeding has stopped.

## 2025-01-13 LAB
PATH REPORT.COMMENTS IMP SPEC: NORMAL
PATH REPORT.COMMENTS IMP SPEC: NORMAL
PATH REPORT.FINAL DX SPEC: NORMAL
PATH REPORT.GROSS SPEC: NORMAL
PATH REPORT.MICROSCOPIC SPEC OTHER STN: NORMAL
PATH REPORT.RELEVANT HX SPEC: NORMAL

## 2025-02-20 DIAGNOSIS — N52.31 ERECTILE DYSFUNCTION AFTER RADICAL PROSTATECTOMY: ICD-10-CM

## 2025-03-17 ENCOUNTER — OFFICE VISIT (OUTPATIENT)
Dept: DERMATOLOGY | Facility: CLINIC | Age: 65
End: 2025-03-17
Payer: COMMERCIAL

## 2025-03-17 DIAGNOSIS — D48.5 NEOPLASM OF UNCERTAIN BEHAVIOR OF SKIN: Primary | ICD-10-CM

## 2025-03-17 PROCEDURE — 13151 CMPLX RPR E/N/E/L 1.1-2.5 CM: CPT | Performed by: DERMATOLOGY

## 2025-03-17 PROCEDURE — 11441 EXC FACE-MM B9+MARG 0.6-1 CM: CPT | Performed by: DERMATOLOGY

## 2025-03-17 NOTE — PATIENT INSTRUCTIONS
Sutured Wound Care     Right upper eyelid    [facility] [phone]              No strenuous activity for 48 hours. Resume moderate activity in 48 hours. No heavy exercising until you are seen for follow up in one week.     Take Tylenol as needed for discomfort.                         Do not drink alcoholic beverages for 48 hours.     Keep the pressure bandage in place for 24 hours. If the bandage becomes blood tinged or loose, reinforce it with gauze and tape.        (Refer to the reverse side of this page for management of bleeding).    Remove pressure bandage in 24 hours     Leave the flat bandage in place until your follow up appointment.    Keep the bandage dry. Wash around it carefully.    If the tape becomes soiled or starts to come off, reinforce it with additional paper tape.    Do not smoke for 3 weeks; smoking is detrimental to wound healing.    It is normal to have swelling and bruising around the surgical site. The bruising will fade in approximately 10-14 days. Elevate the area to reduce swelling.    Numbness, itchiness and sensitivity to temperature changes can occur after surgery and may take up to 18 months to normalize.      POSSIBLE COMPLICATIONS    BLEEDING:    Leave the bandage in place.  Use tightly rolled up gauze or a cloth to apply direct pressure over the bandage for 20   minutes.  Reapply pressure for an additional 20 minutes if necessary  Call the office or go to the nearest emergency room if pressure fails to stop the bleeding.  Use additional gauze and tape to maintain pressure once the bleeding has stopped.        PAIN:    Post operative pain should slowly get better, never worse.  A severe increase in pain may indicate a problem. Call the office if this occurs.    In case of emergency phone:Dr Whitman [phone]

## 2025-03-17 NOTE — LETTER
3/17/2025      Yogi Moreland  [address] [address]      Dear Colleague,    Thank you for referring your patient, Yogi Moreland, to the [facility]. Please see a copy of my visit note below.    Yogi Moreland is an extremely pleasant 64 year old year old male patient here today for evaluation and managment of cyst v xanthelasma on right upper eyelid.  Patient has no other skin complaints today.  Remainder of the HPI, Meds, PMH, Allergies, FH, and SH was reviewed in chart.      Past Medical History:   Diagnosis Date     Colon polyp      Colonic diverticular abscess      Dermatitis 02/12/2019     Elevated LFTs 08/2016     Elevated PSA      Enlarged prostate      Herpes zoster without complication 03/05/2019     History of prostate cancer 04/06/2017     Hyperlipidemia      Neuropathy 02/12/2019     Prediabetes 01/23/2013     Prostate cancer (H)      Rotator cuff syndrome, left      S/P colostomy takedown 12/27/2016     Sebaceous cyst 02/12/2019     Sleep disorder 05/07/2024     Xanthelasma of right upper eyelid 05/07/2024       Past Surgical History:   Procedure Laterality Date     APPENDECTOMY OPEN N/A 09/26/2016    Procedure: APPENDECTOMY OPEN;  Surgeon: Ramón Masters MD;  Location:  OR     BIOPSY  2/17/17    prostate biopsy     COLECTOMY WITH COLOSTOMY, COMBINED N/A 09/26/2016    Procedure: COMBINED COLECTOMY WITH COLOSTOMY;  Surgeon: aRmón Masters MD;  Location:  OR     COLONOSCOPY  2009     COLONOSCOPY  02/2013     COLONOSCOPY N/A 03/22/2018    Procedure: COLONOSCOPY;  COLONOSCOPY;  Surgeon: Chris Barrios MD;  Location:  GI     EXCISE MASS BACK N/A 07/14/2020    Procedure: EXCISION BACK CYST;  Surgeon: Ramón Masters MD;  Location:  OR     GENITOURINARY SURGERY  7/11/2017    Prostatectomy     HERNIORRHAPHY INGUINAL Left 12/15/2017    Procedure: HERNIORRHAPHY INGUINAL;  Left Inguinal Herniorrhaphy with Mesh;  Surgeon: Ramón Masters MD;   Location: [facility] OR     HERNIORRHAPHY INGUINAL Right 07/14/2020    Procedure: OPEN RIGHT INGUINAL HERNIA REPAIR WITH MESH;  Surgeon: Ramón Masters MD;  Location: [facility] OR     LAPAROSCOPIC ASSISTED COLOSTOMY TAKEDOWN N/A 12/27/2016    Procedure: LAPAROSCOPIC ASSISTED COLOSTOMY TAKEDOWN;  Surgeon: Ramón Masters MD;  Location: [facility] OR     LAPAROTOMY EXPLORATORY N/A 09/26/2016    Procedure: LAPAROTOMY EXPLORATORY;  Surgeon: Ramón Masters MD;  Location: [facility] OR     PROSTATECTOMY RETROPUBIC RADICAL N/A 07/11/2017    Procedure: PROSTATECTOMY RETROPUBIC RADICAL;  Retropubic Radical Prostectectomy, Bilateral pelvic Lymph Node Dissection ;  Surgeon: Chuy Reddy MD;  Location: [facility] OR        Family History   Problem Relation Age of Onset     Cancer Mother         metastatic     Other Cancer Mother      Cancer Father         melanoma      Other Cancer Father      Colon Cancer No family hx of        Social History     Socioeconomic History     Marital status:      Spouse name: Not on file     Number of children: Not on file     Years of education: Not on file     Highest education level: Not on file   Occupational History     Occupation:      Employer: [employer]   Tobacco Use     Smoking status: Never     Smokeless tobacco: Never   Vaping Use     Vaping status: Never Used   Substance and Sexual Activity     Alcohol use: Yes     Comment: 7-10 week     Drug use: No     Sexual activity: Yes     Partners: Female     Birth control/protection: None   Other Topics Concern     Parent/sibling w/ CABG, MI or angioplasty before 65F 55M? No   Social History Narrative     Not on file     Social Drivers of Health     Financial Resource Strain: Low Risk  (5/5/2024)    Financial Resource Strain      Within the past 12 months, have you or your family members you live with been unable to get utilities (heat, electricity) when it was really needed?: No   Food Insecurity: Low Risk  (5/5/2024)    Food Insecurity       Within the past 12 months, did you worry that your food would run out before you got money to buy more?: No      Within the past 12 months, did the food you bought just not last and you didn t have money to get more?: No   Transportation Needs: Low Risk  (5/5/2024)    Transportation Needs      Within the past 12 months, has lack of transportation kept you from medical appointments, getting your medicines, non-medical meetings or appointments, work, or from getting things that you need?: No   Physical Activity: Patient Declined (5/5/2024)    Exercise Vital Sign      Days of Exercise per Week: Patient declined      Minutes of Exercise per Session: Patient declined   Stress: No Stress Concern Present (5/5/2024)    Citizen of [place] of Occupational Health - Occupational Stress Questionnaire      Feeling of Stress : Not at all   Social Connections: Patient Declined (5/5/2024)    Social Connection and Isolation Panel [NHANES]      Frequency of Communication with Friends and Family: Patient declined      Frequency of Social Gatherings with Friends and Family: Patient declined      Attends Nondenominational Services: Patient declined      Active Member of Clubs or Organizations: Patient declined      Attends Club or Organization Meetings: Patient declined      Marital Status: Patient declined   Interpersonal Safety: Low Risk  (5/7/2024)    Interpersonal Safety      Do you feel physically and emotionally safe where you currently live?: Yes      Within the past 12 months, have you been hit, slapped, kicked or otherwise physically hurt by someone?: No      Within the past 12 months, have you been humiliated or emotionally abused in other ways by your partner or ex-partner?: No   Housing Stability: Low Risk  (5/5/2024)    Housing Stability      Do you have housing? : Yes      Are you worried about losing your housing?: No       Outpatient Encounter Medications as of 3/17/2025   Medication Sig Dispense Refill     COMPOUNDED  NON-CONTROLLED SUBSTANCE (CMPD RX) - PHARMACY TO MIX COMPOUNDED MEDICATION Tadalafil trouche 17.5 mg, #30 w/ 3 refills. Dissolve one between cheek and gum prior to sexual intercourse, PRN. Not to exceed one in 24 hours 30 lozenge 3     gabapentin (NEURONTIN) 100 MG capsule Take 1 capsule (100 mg) by mouth at bedtime 90 capsule 2     rosuvastatin (CRESTOR) 40 MG tablet Take 1 tablet (40 mg) by mouth daily 90 tablet 3     sildenafil (REVATIO) 20 MG tablet Take  40 mg (2 tablets) to 100 mg (5 tablets) prn  as needed prior to sexual activity . Do not exceed 100 mg in a  24 hour period 30 tablet 11     No facility-administered encounter medications on file as of 3/17/2025.             O:   NAD, WDWN, Alert & Oriented, Mood & Affect wnl, Vitals stable   General appearance normal   Vitals stable   Alert, oriented and in no acute distress     R upper lid 6mm white yellow papule      Eyes: Conjunctivae/lids:Normal     ENT: Lips, mucosa: normal    MSK:Normal    Cardiovascular: peripheral edema none    Pulm: Breathing Normal    Neuro/Psych: Orientation:Alert and Orientedx3 ; Mood/Affect:normal       A/P:  Cyst v xanthelasma   Scar and recurrence discussed with patient   EXCISION OF BENIGN LESION AND COMPLEX: After thorough discussion of PGACAC, consent obtained, anesthesia and prep, the margins of the lesion were identified and an incision was made encompassing the lesion 2mm margin size 1cm.  . The incisions were made through the skin and down to and including the subcutaneous tissue. The lesion was removed en bloc and submitted for perm  pathologic review. The wound edges were widely undermined until adequate tissue mobility was obtained. hemostasis was achieved. The wound edges were then closed in a layered fashion with 5 Vicryl and 5.0 Fast gut , being careful not to leave any dead space. Postoperative length was 1.2 cm.   EBL minimal; complications none; wound care routine. The patient was discharged in good condition and  will return in one week for wound evaluation.  It was a pleasure speaking to Yogi Moreland today.      Again, thank you for allowing me to participate in the care of your patient.        Sincerely,        Avinash Whitman MD    Electronically signed

## 2025-03-17 NOTE — LETTER
March 20, 2025      Yogi Arredondomelissa  [address] [facility] [address]        Dear ,        We are writing to inform you of your test results.      Skin, right upper lid, excision:   - Xanthelasma   - Milia cysts       No further treatment required.      If you have any questions or concerns, please call the clinic at the number listed above.       Sincerely,      Avinash Whitman MD/Lorene BAZAN MA      Electronically signed

## 2025-03-17 NOTE — PROGRESS NOTES
Yogi Moreland is an extremely pleasant 64 year old year old male patient here today for evaluation and managment of cyst v xanthelasma on right upper eyelid.  Patient has no other skin complaints today.  Remainder of the HPI, Meds, PMH, Allergies, FH, and SH was reviewed in chart.      Past Medical History:   Diagnosis Date    Colon polyp     Colonic diverticular abscess     Dermatitis 02/12/2019    Elevated LFTs 08/2016    Elevated PSA     Enlarged prostate     Herpes zoster without complication 03/05/2019    History of prostate cancer 04/06/2017    Hyperlipidemia     Neuropathy 02/12/2019    Prediabetes 01/23/2013    Prostate cancer (H)     Rotator cuff syndrome, left     S/P colostomy takedown 12/27/2016    Sebaceous cyst 02/12/2019    Sleep disorder 05/07/2024    Xanthelasma of right upper eyelid 05/07/2024       Past Surgical History:   Procedure Laterality Date    APPENDECTOMY OPEN N/A 09/26/2016    Procedure: APPENDECTOMY OPEN;  Surgeon: Ramón Masters MD;  Location:  OR    BIOPSY  2/17/17    prostate biopsy    COLECTOMY WITH COLOSTOMY, COMBINED N/A 09/26/2016    Procedure: COMBINED COLECTOMY WITH COLOSTOMY;  Surgeon: Ramón Masters MD;  Location:  OR    COLONOSCOPY  2009    COLONOSCOPY  02/2013    COLONOSCOPY N/A 03/22/2018    Procedure: COLONOSCOPY;  COLONOSCOPY;  Surgeon: Chris Barrios MD;  Location:  GI    EXCISE MASS BACK N/A 07/14/2020    Procedure: EXCISION BACK CYST;  Surgeon: Ramón Masters MD;  Location:  OR    GENITOURINARY SURGERY  7/11/2017    Prostatectomy    HERNIORRHAPHY INGUINAL Left 12/15/2017    Procedure: HERNIORRHAPHY INGUINAL;  Left Inguinal Herniorrhaphy with Mesh;  Surgeon: Ramón Masters MD;  Location:  OR    HERNIORRHAPHY INGUINAL Right 07/14/2020    Procedure: OPEN RIGHT INGUINAL HERNIA REPAIR WITH MESH;  Surgeon: Ramón Masters MD;  Location:  OR    LAPAROSCOPIC ASSISTED COLOSTOMY TAKEDOWN N/A 12/27/2016    Procedure: LAPAROSCOPIC ASSISTED  COLOSTOMY TAKEDOWN;  Surgeon: Ramón Masters MD;  Location: [facility] OR    LAPAROTOMY EXPLORATORY N/A 09/26/2016    Procedure: LAPAROTOMY EXPLORATORY;  Surgeon: Ramón Masters MD;  Location: [facility] OR    PROSTATECTOMY RETROPUBIC RADICAL N/A 07/11/2017    Procedure: PROSTATECTOMY RETROPUBIC RADICAL;  Retropubic Radical Prostectectomy, Bilateral pelvic Lymph Node Dissection ;  Surgeon: Chuy Reddy MD;  Location: [facility] OR        Family History   Problem Relation Age of Onset    Cancer Mother         metastatic    Other Cancer Mother     Cancer Father         melanoma     Other Cancer Father     Colon Cancer No family hx of        Social History     Socioeconomic History    Marital status:      Spouse name: Not on file    Number of children: Not on file    Years of education: Not on file    Highest education level: Not on file   Occupational History    Occupation:      Employer: for[MD]   Tobacco Use    Smoking status: Never    Smokeless tobacco: Never   Vaping Use    Vaping status: Never Used   Substance and Sexual Activity    Alcohol use: Yes     Comment: 7-10 week    Drug use: No    Sexual activity: Yes     Partners: Female     Birth control/protection: None   Other Topics Concern    Parent/sibling w/ CABG, MI or angioplasty before 65F 55M? No   Social History Narrative    Not on file     Social Drivers of Health     Financial Resource Strain: Low Risk  (5/5/2024)    Financial Resource Strain     Within the past 12 months, have you or your family members you live with been unable to get utilities (heat, electricity) when it was really needed?: No   Food Insecurity: Low Risk  (5/5/2024)    Food Insecurity     Within the past 12 months, did you worry that your food would run out before you got money to buy more?: No     Within the past 12 months, did the food you bought just not last and you didn t have money to get more?: No   Transportation Needs: Low Risk  (5/5/2024)    Transportation  Needs     Within the past 12 months, has lack of transportation kept you from medical appointments, getting your medicines, non-medical meetings or appointments, work, or from getting things that you need?: No   Physical Activity: Patient Declined (5/5/2024)    Exercise Vital Sign     Days of Exercise per Week: Patient declined     Minutes of Exercise per Session: Patient declined   Stress: No Stress Concern Present (5/5/2024)    [place] of Occupational Health - Occupational Stress Questionnaire     Feeling of Stress : Not at all   Social Connections: Patient Declined (5/5/2024)    Social Connection and Isolation Panel [NHANES]     Frequency of Communication with Friends and Family: Patient declined     Frequency of Social Gatherings with Friends and Family: Patient declined     Attends Anabaptism Services: Patient declined     Active Member of Clubs or Organizations: Patient declined     Attends Club or Organization Meetings: Patient declined     Marital Status: Patient declined   Interpersonal Safety: Low Risk  (5/7/2024)    Interpersonal Safety     Do you feel physically and emotionally safe where you currently live?: Yes     Within the past 12 months, have you been hit, slapped, kicked or otherwise physically hurt by someone?: No     Within the past 12 months, have you been humiliated or emotionally abused in other ways by your partner or ex-partner?: No   Housing Stability: Low Risk  (5/5/2024)    Housing Stability     Do you have housing? : Yes     Are you worried about losing your housing?: No       Outpatient Encounter Medications as of 3/17/2025   Medication Sig Dispense Refill    COMPOUNDED NON-CONTROLLED SUBSTANCE (CMPD RX) - PHARMACY TO MIX COMPOUNDED MEDICATION Tadalafil trouche 17.5 mg, #30 w/ 3 refills. Dissolve one between cheek and gum prior to sexual intercourse, PRN. Not to exceed one in 24 hours 30 lozenge 3    gabapentin (NEURONTIN) 100 MG capsule Take 1 capsule (100 mg) by mouth at  bedtime 90 capsule 2    rosuvastatin (CRESTOR) 40 MG tablet Take 1 tablet (40 mg) by mouth daily 90 tablet 3    sildenafil (REVATIO) 20 MG tablet Take  40 mg (2 tablets) to 100 mg (5 tablets) prn  as needed prior to sexual activity . Do not exceed 100 mg in a  24 hour period 30 tablet 11     No facility-administered encounter medications on file as of 3/17/2025.             O:   NAD, WDWN, Alert & Oriented, Mood & Affect wnl, Vitals stable   General appearance normal   Vitals stable   Alert, oriented and in no acute distress     R upper lid 6mm white yellow papule      Eyes: Conjunctivae/lids:Normal     ENT: Lips, mucosa: normal    MSK:Normal    Cardiovascular: peripheral edema none    Pulm: Breathing Normal    Neuro/Psych: Orientation:Alert and Orientedx3 ; Mood/Affect:normal       A/P:  Cyst v xanthelasma   Scar and recurrence discussed with patient   EXCISION OF BENIGN LESION AND COMPLEX: After thorough discussion of PGACAC, consent obtained, anesthesia and prep, the margins of the lesion were identified and an incision was made encompassing the lesion 2mm margin size 1cm.  . The incisions were made through the skin and down to and including the subcutaneous tissue. The lesion was removed en bloc and submitted for perm  pathologic review. The wound edges were widely undermined until adequate tissue mobility was obtained. hemostasis was achieved. The wound edges were then closed in a layered fashion with 5 Vicryl and 5.0 Fast gut , being careful not to leave any dead space. Postoperative length was 1.2 cm.   EBL minimal; complications none; wound care routine. The patient was discharged in good condition and will return in one week for wound evaluation.  It was a pleasure speaking to Yogi Moreland today.

## 2025-03-24 ENCOUNTER — ALLIED HEALTH/NURSE VISIT (OUTPATIENT)
Dept: DERMATOLOGY | Facility: CLINIC | Age: 65
End: 2025-03-24
Payer: COMMERCIAL

## 2025-03-24 DIAGNOSIS — H02.61 XANTHELASMA OF RIGHT UPPER EYELID: Primary | ICD-10-CM

## 2025-03-24 PROCEDURE — 99207 PR NO CHARGE NURSE ONLY: CPT | Performed by: STUDENT IN AN ORGANIZED HEALTH CARE EDUCATION/TRAINING PROGRAM

## 2025-03-24 NOTE — PATIENT INSTRUCTIONS
WOUND CARE INSTRUCTIONS  for  ONE WEEK AFTER SURGERY          Leave flat bandage on your skin for one week after today s bandage change.  In one week when you remove the bandage, you may resume your regular skin care routine, including washing with mild soap and water, applying moisturizer, make-up and sunscreen.    If there are any open or bleeding areas at the incision/graft site you should begin to cover the area with a bandage daily as follows:    Clean and dry the area with plain tap water using a Q-tip or sterile gauze pad.  Apply Polysporin or Bacitracin ointment to the open area.  Cover the wound with a band-aid or a sterile non-stick gauze pad and micropore paper tape.         SIGNS OF INFECTION  - If you notice any of these signs of infection, call your doctor right away: expanding redness around the wound.  - Yellow or greenish-colored pus or cloudy wound drainage.    - Red streaking spreading from the wound.  - Increased swelling, tenderness, or pain around the wound.   - Fever.    Please remember that yellow and clear drainage from a wound can be normal and related to normal wound healing.  Isolated drainage from a wound without a combination of the above features does not indicate infection.       *Once the bandages are removed, the scar will be red and firm (especially in the lip/chin area). This is normal and will fade in time. It might take 6-12 months for this to happen.     *Massaging the area will help the scar soften and fade quicker. Begin to massage the area one month after the bandages have been removed. To massage apply pressure directly and firmly over the scar with the fingertips and move in a circular motion. Massage the area for a few minutes several times a day. Continue to massage the site for several months.    *Approximately 6-8 weeks after surgery it is not uncommon to see the formation of  tender pimple-like  bump along the scar. This is normal. As the scar continues to mature and  the stitches underneath the skin begin to dissolve, this might occur. Do not pick or squeeze, this will resolve on it s own. Should one break open producing a small amount of drainage, apply Polysporin or Bacitracin ointment a few times a day until the wound is completely healed.    *Numbness in the surgical area is expected. It might take 12-18 months for the feeling to return to normal. During this time sensations of itchiness, tingling and occasional sharp pains might be noted. These feelings are normal and will subside once the nerves have completely healed.         IN CASE OF EMERGENCY: Dr Whitman [phone]     If you were seen in [place] call: [phone]    If you were seen in [place] call: [phone]

## 2025-03-24 NOTE — PROGRESS NOTES
Yogi BRIAN Moreland comes into clinic today at the request of Dr Whitman Ordering Provider for Wound Check Action taken: the area was cleaned and a steri strip applied to the area .      This service provided today was under the supervising provider of the day Dr Vanegas, who was available if needed.    Pt returned to clinic for post surgery 1 week follow up bandage change. Pt has no complaints, denies pain. Bandage removed from right eyelid, area cleansed with normal saline. Site is healing and wound edges approximating well. Reapplied new steri strips and paper tape.    Advised to watch for signs/sx of infection; spreading redness, drainage, odor, fever. Call or report promptly to clinic. Pt given written instructions and informed to rtc as needed. Patient verbalized understanding.       Hilary Roth MA

## 2025-04-03 ENCOUNTER — TELEPHONE (OUTPATIENT)
Dept: FAMILY MEDICINE | Facility: CLINIC | Age: 65
End: 2025-04-03

## 2025-04-03 NOTE — CONFIDENTIAL NOTE
Patient Quality Outreach    Patient is due for the following:   Physical Annual Wellness Visit    Action(s) Taken:   Patient has upcoming appointment, these items will be addressed at that time.    Type of outreach:    Chart review performed, no outreach needed.    Questions for provider review:    None         Ricki Rothman MA  Chart routed to .

## 2025-04-11 NOTE — CONFIDENTIAL NOTE
Patient Quality Outreach    Patient is due for the following:   Physical Annual Wellness Visit    Action(s) Taken:   Patient has upcoming appointment, these items will be addressed at that time.    Type of outreach:    Chart review performed, no outreach needed.    Questions for provider review:    None         Ricki Rothman MA  Chart routed to.

## 2025-04-30 DIAGNOSIS — N52.31 ERECTILE DYSFUNCTION AFTER RADICAL PROSTATECTOMY: ICD-10-CM

## 2025-04-30 RX ORDER — SILDENAFIL CITRATE 20 MG/1
TABLET ORAL
Qty: 170 TABLET | Refills: 0 | Status: SHIPPED | OUTPATIENT
Start: 2025-04-30

## 2025-06-11 ENCOUNTER — LAB (OUTPATIENT)
Dept: LAB | Facility: CLINIC | Age: 65
End: 2025-06-11
Payer: COMMERCIAL

## 2025-06-11 DIAGNOSIS — R97.21 BIOCHEMICALLY RECURRENT MALIGNANT NEOPLASM OF PROSTATE (H): ICD-10-CM

## 2025-06-11 DIAGNOSIS — C61 BIOCHEMICALLY RECURRENT MALIGNANT NEOPLASM OF PROSTATE (H): ICD-10-CM

## 2025-06-11 LAB — PSA SERPL DL<=0.01 NG/ML-MCNC: 0.08 NG/ML (ref 0–4.5)

## 2025-06-11 PROCEDURE — 84153 ASSAY OF PSA TOTAL: CPT

## 2025-06-11 PROCEDURE — 36415 COLL VENOUS BLD VENIPUNCTURE: CPT

## 2025-06-12 ENCOUNTER — RESULTS FOLLOW-UP (OUTPATIENT)
Dept: UROLOGY | Facility: CLINIC | Age: 65
End: 2025-06-12

## 2025-06-17 ENCOUNTER — VIRTUAL VISIT (OUTPATIENT)
Dept: UROLOGY | Facility: CLINIC | Age: 65
End: 2025-06-17
Payer: COMMERCIAL

## 2025-06-17 DIAGNOSIS — R97.21 BIOCHEMICALLY RECURRENT MALIGNANT NEOPLASM OF PROSTATE (H): Primary | ICD-10-CM

## 2025-06-17 DIAGNOSIS — C61 BIOCHEMICALLY RECURRENT MALIGNANT NEOPLASM OF PROSTATE (H): Primary | ICD-10-CM

## 2025-06-17 PROCEDURE — 1126F AMNT PAIN NOTED NONE PRSNT: CPT | Performed by: STUDENT IN AN ORGANIZED HEALTH CARE EDUCATION/TRAINING PROGRAM

## 2025-06-17 PROCEDURE — 98005 SYNCH AUDIO-VIDEO EST LOW 20: CPT | Performed by: STUDENT IN AN ORGANIZED HEALTH CARE EDUCATION/TRAINING PROGRAM

## 2025-06-17 NOTE — NURSING NOTE
Current patient location: [id number] [place] [id number]    Is the patient currently in the state of [place]? YES    Visit mode: VIDEO    If the visit is dropped, the patient can be reconnected by:VIDEO VISIT: Text to cell phone:   Telephone Information:   Mobile [phone]       Will anyone else be joining the visit? NO  (If patient encounters technical issues they should call [phone])    Are changes needed to the allergy or medication list? Pt stated no med changes    Are refills needed on medications prescribed by this physician? NO    Rooming Documentation:  Not applicable    Reason for visit: NANNETTE KAMARA

## 2025-06-17 NOTE — LETTER
6/17/2025       RE: Yogi Moreland  [address] [facility] [id number]     Dear Colleague,    Thank you for referring your patient, Yogi Moreland, to the [facility] UROLOGY [facility] [place] at [facility]. Please see a copy of my visit note below.    CHIEF COMPLAINT   Yogi Moreland who is a 64 year old male returns today for follow-up of prostate cancer s/p RRP + BPLND 7/11/2017 (iPSA 7.46 ng/ml, [place] 3+4=7 [id number], negative margins) with biochemical recurrence s/p salvage RT completed 3/27/2023 (Zackery), erectile dysfunction     HPI   Yogi Moreland is a 64 year old male returns today for follow-up of prostate cancer s/p RRP + BPLND 7/11/2017 (iPSA 7.46 ng/ml, [place] 3+4=7 [id number], negative margins) with biochemical recurrence s/p salvage RT completed 3/27/2023 (Zackery), erectile dysfunction     No new urinary issues to discuss    PHYSICAL EXAM  Patient is a 64 year old  male   Vitals: There were no vitals taken for this visit.  There is no height or weight on file to calculate BMI.  General Appearance Adult:   Alert, no acute distress, oriented  HENT: throat/mouth:normal, good dentition  Lungs: no respiratory distress, or pursed lip breathing  Heart: No obvious jugular venous distension present  Abdomen: nondistended  Musculoskeltal: extremities normal, no peripheral edema  Skin: no suspicious lesions or rashes  Neuro: Alert, oriented, speech and mentation normal  Psych: affect and mood normal       PSA PSA Diag Urologic Phys PSA Tumor Marker   Latest Ref Rng 0.00 - 4.00 ug/L 0.00 - 4.00 ng/mL 0.00 - 4.50 ng/mL   1/23/2013  8:48 AM 5.14 (H)      9/2/2016  8:13 AM 5.53 (H)      11/2/2016  7:57 AM 7.46 (H)      1/23/2017  3:59 PM 7.61 (H)      8/21/2017  1:20 PM 0.01      11/28/2017  7:58 AM <0.01      2/15/2018  8:17 AM <0.01      5/17/2018  9:04 AM <0.01      10/25/2018  10:03 AM 0.01      11/29/2018  11:17 AM  <0.04     3/4/2019  4:08 PM 0.02       9/17/2019  10:38 AM 0.02      2/18/2020  11:45 AM 0.03      2/18/2021  9:52 AM 0.04      8/13/2021  8:38 AM 0.05      1/6/2022  9:40 AM 0.06      7/19/2022  8:30 AM 0.07      1/24/2023  9:51 AM   0.15    6/21/2023  9:28 AM   0.02    12/20/2023  9:11 AM   <0.01    6/19/2024  9:08 AM   <0.01    6/11/2025  1:07 PM   0.08       Legend:  (H) High    ASSESSMENT and PLAN  64 year old male returns today for follow-up of prostate cancer s/p RRP + BPLND 7/11/2017 (iPSA 7.46 ng/ml, [place] 3+4=7 [id number], negative margins) with biochemical recurrence s/p salvage RT completed 3/27/2023 (Zackery), erectile dysfunction     Now with rising PSA after the salvage radiation, was previously undetectable immediately after the RT. Discussed with patient need to close followup of PSA to determine the trend. If rising rapidly, would recommend starting androgen deprivation therapy. Risks of ADT including fatigue, loss of bone and muscle mass, loss of libido discussed. PSA is likely too low for staging imaging to be useful at this point    If rapid PSA doubling time, will strongly consider initiating androgen deprivation therapy    - will recheck PSA in 3 months      Fercho Serra MD   [facility] Urology  [facility] Phone: [phone]        Virtual Visit Details    Type of service:  Video Visit   Video Start Time: 9:52AM  Video End Time:10:02 AM    Originating Location (pt. Location): Home  {PROVIDER LOCATION On-site should be selected for visits conducted from your clinic location or adjoining [facility] hospital, academic office, or other nearby [facility] building. Off-site should be selected for all other provider locations, including home:[id number]}  Distant Location (provider location):  On-site  Platform used for Video Visit: Love    The longitudinal plan of care for the diagnosis(es)/condition(s) as documented were addressed during this visit. Due to the added complexity in care, I will continue to support Yogi in  the subsequent management and with ongoing continuity of care.      Again, thank you for allowing me to participate in the care of your patient.      Sincerely,    Fercho Serra MD

## 2025-06-17 NOTE — PROGRESS NOTES
CHIEF COMPLAINT   Yogi Moreland who is a 64 year old male returns today for follow-up of prostate cancer s/p RRP + BPLND 7/11/2017 (iPSA 7.46 ng/ml, [place] 3+4=7 [id number], negative margins) with biochemical recurrence s/p salvage RT completed 3/27/2023 ([place]), erectile dysfunction     HPI   Yogi Moreland is a 64 year old male returns today for follow-up of prostate cancer s/p RRP + BPLND 7/11/2017 (iPSA 7.46 ng/ml, [place] 3+4=7 [id number], negative margins) with biochemical recurrence s/p salvage RT completed 3/27/2023 ([place]), erectile dysfunction     No new urinary issues to discuss    PHYSICAL EXAM  Patient is a 64 year old  male   Vitals: There were no vitals taken for this visit.  There is no height or weight on file to calculate BMI.  General Appearance Adult:   Alert, no acute distress, oriented  HENT: throat/mouth:normal, good dentition  Lungs: no respiratory distress, or pursed lip breathing  Heart: No obvious jugular venous distension present  Abdomen: nondistended  Musculoskeltal: extremities normal, no peripheral edema  Skin: no suspicious lesions or rashes  Neuro: Alert, oriented, speech and mentation normal  Psych: affect and mood normal       PSA PSA Diag Urologic Phys PSA Tumor Marker   Latest Ref Rng 0.00 - 4.00 ug/L 0.00 - 4.00 ng/mL 0.00 - 4.50 ng/mL   1/23/2013  8:48 AM 5.14 (H)      9/2/2016  8:13 AM 5.53 (H)      11/2/2016  7:57 AM 7.46 (H)      1/23/2017  3:59 PM 7.61 (H)      8/21/2017  1:20 PM 0.01      11/28/2017  7:58 AM <0.01      2/15/2018  8:17 AM <0.01      5/17/2018  9:04 AM <0.01      10/25/2018  10:03 AM 0.01      11/29/2018  11:17 AM  <0.04     3/4/2019  4:08 PM 0.02      9/17/2019  10:38 AM 0.02      2/18/2020  11:45 AM 0.03      2/18/2021  9:52 AM 0.04      8/13/2021  8:38 AM 0.05      1/6/2022  9:40 AM 0.06      7/19/2022  8:30 AM 0.07      1/24/2023  9:51 AM   0.15    6/21/2023  9:28 AM   0.02    12/20/2023  9:11 AM   <0.01    6/19/2024  9:08 AM   <0.01    6/11/2025  1:07  PM   0.08       Legend:  (H) High    ASSESSMENT and PLAN  64 year old male returns today for follow-up of prostate cancer s/p RRP + BPLND 7/11/2017 (iPSA 7.46 ng/ml, [place] 3+4=7 [id number], negative margins) with biochemical recurrence s/p salvage RT completed 3/27/2023 (Zackery), erectile dysfunction     Now with rising PSA after the salvage radiation, was previously undetectable immediately after the RT. Discussed with patient need to close followup of PSA to determine the trend. If rising rapidly, would recommend starting androgen deprivation therapy. Risks of ADT including fatigue, loss of bone and muscle mass, loss of libido discussed. PSA is likely too low for staging imaging to be useful at this point    If rapid PSA doubling time, will strongly consider initiating androgen deprivation therapy    - will recheck PSA in 3 months      Fercho Serra MD   [facility] Urology  [facility] Phone: [phone]        Virtual Visit Details    Type of service:  Video Visit   Video Start Time: 9:52AM  Video End Time:10:02 AM    Originating Location (pt. Location): Home    Distant Location (provider location):  On-site  Platform used for Video Visit: Love    The longitudinal plan of care for the diagnosis(es)/condition(s) as documented were addressed during this visit. Due to the added complexity in care, I will continue to support Yogi in the subsequent management and with ongoing continuity of care.

## 2025-06-19 DIAGNOSIS — E78.5 HYPERLIPIDEMIA LDL GOAL <160: ICD-10-CM

## 2025-06-19 RX ORDER — ROSUVASTATIN CALCIUM 40 MG/1
40 TABLET, COATED ORAL DAILY
Qty: 90 TABLET | Refills: 0 | Status: SHIPPED | OUTPATIENT
Start: 2025-06-19

## 2025-07-03 SDOH — HEALTH STABILITY: PHYSICAL HEALTH: ON AVERAGE, HOW MANY MINUTES DO YOU ENGAGE IN EXERCISE AT THIS LEVEL?: PATIENT DECLINED

## 2025-07-03 ASSESSMENT — SOCIAL DETERMINANTS OF HEALTH (SDOH): HOW OFTEN DO YOU GET TOGETHER WITH FRIENDS OR RELATIVES?: PATIENT DECLINED

## 2025-07-05 ENCOUNTER — HEALTH MAINTENANCE LETTER (OUTPATIENT)
Age: 65
End: 2025-07-05

## 2025-07-08 ENCOUNTER — OFFICE VISIT (OUTPATIENT)
Dept: FAMILY MEDICINE | Facility: CLINIC | Age: 65
End: 2025-07-08
Payer: COMMERCIAL

## 2025-07-08 VITALS
TEMPERATURE: 98.5 F | SYSTOLIC BLOOD PRESSURE: 121 MMHG | OXYGEN SATURATION: 97 % | DIASTOLIC BLOOD PRESSURE: 73 MMHG | RESPIRATION RATE: 15 BRPM | WEIGHT: 181 LBS | HEIGHT: 71 IN | HEART RATE: 71 BPM | BODY MASS INDEX: 25.34 KG/M2

## 2025-07-08 DIAGNOSIS — E78.5 HYPERLIPIDEMIA LDL GOAL <160: ICD-10-CM

## 2025-07-08 DIAGNOSIS — R73.03 PREDIABETES: ICD-10-CM

## 2025-07-08 DIAGNOSIS — Z00.00 ENCOUNTER FOR MEDICARE ANNUAL WELLNESS EXAM: Primary | ICD-10-CM

## 2025-07-08 LAB
EST. AVERAGE GLUCOSE BLD GHB EST-MCNC: 114 MG/DL
HBA1C MFR BLD: 5.6 % (ref 0–5.6)

## 2025-07-08 PROCEDURE — 3074F SYST BP LT 130 MM HG: CPT | Performed by: FAMILY MEDICINE

## 2025-07-08 PROCEDURE — 3044F HG A1C LEVEL LT 7.0%: CPT | Performed by: FAMILY MEDICINE

## 2025-07-08 PROCEDURE — G0438 PPPS, INITIAL VISIT: HCPCS | Performed by: FAMILY MEDICINE

## 2025-07-08 PROCEDURE — 1126F AMNT PAIN NOTED NONE PRSNT: CPT | Performed by: FAMILY MEDICINE

## 2025-07-08 PROCEDURE — 36415 COLL VENOUS BLD VENIPUNCTURE: CPT | Performed by: FAMILY MEDICINE

## 2025-07-08 PROCEDURE — 83036 HEMOGLOBIN GLYCOSYLATED A1C: CPT | Performed by: FAMILY MEDICINE

## 2025-07-08 PROCEDURE — 3048F LDL-C <100 MG/DL: CPT | Performed by: FAMILY MEDICINE

## 2025-07-08 PROCEDURE — 3078F DIAST BP <80 MM HG: CPT | Performed by: FAMILY MEDICINE

## 2025-07-08 PROCEDURE — 80053 COMPREHEN METABOLIC PANEL: CPT | Performed by: FAMILY MEDICINE

## 2025-07-08 PROCEDURE — 80061 LIPID PANEL: CPT | Performed by: FAMILY MEDICINE

## 2025-07-08 RX ORDER — ROSUVASTATIN CALCIUM 40 MG/1
40 TABLET, COATED ORAL AT BEDTIME
Qty: 90 TABLET | Refills: 3 | Status: SHIPPED | OUTPATIENT
Start: 2025-07-08

## 2025-07-08 ASSESSMENT — PAIN SCALES - GENERAL: PAINLEVEL_OUTOF10: NO PAIN (0)

## 2025-07-08 NOTE — PATIENT INSTRUCTIONS
Patient Education   Preventive Care Advice   This is general advice given by our system to help you stay healthy. However, your care team may have specific advice just for you. Please talk to your care team about your preventive care needs.  Nutrition  Eat 5 or more servings of fruits and vegetables each day.  Try wheat bread, brown rice and whole grain pasta (instead of white bread, rice, and pasta).  Get enough calcium and vitamin D. Check the label on foods and aim for 100% of the RDA (recommended daily allowance).  Lifestyle  Exercise at least 150 minutes each week  (30 minutes a day, 5 days a week).  Do muscle strengthening activities 2 days a week. These help control your weight and prevent disease.  No smoking.  Wear sunscreen to prevent skin cancer.  Have a dental exam and cleaning every 6 months.  Yearly exams  See your health care team every year to talk about:  Any changes in your health.  Any medicines your care team has prescribed.  Preventive care, family planning, and ways to prevent chronic diseases.  Shots (vaccines)   HPV shots (up to age 26), if you've never had them before.  Hepatitis B shots (up to age 59), if you've never had them before.  COVID-19 shot: Get this shot when it's due.  Flu shot: Get a flu shot every year.  Tetanus shot: Get a tetanus shot every 10 years.  Pneumococcal, hepatitis A, and RSV shots: Ask your care team if you need these based on your risk.  Shingles shot (for age 50 and up)  General health tests  Diabetes screening:  Starting at age 35, Get screened for diabetes at least every 3 years.  If you are younger than age 35, ask your care team if you should be screened for diabetes.  Cholesterol test: At age 39, start having a cholesterol test every 5 years, or more often if advised.  Bone density scan (DEXA): At age 50, ask your care team if you should have this scan for osteoporosis (brittle bones).  Hepatitis C: Get tested at least once in your life.  STIs (sexually  transmitted infections)  Before age 24: Ask your care team if you should be screened for STIs.  After age 24: Get screened for STIs if you're at risk. You are at risk for STIs (including HIV) if:  You are sexually active with more than one person.  You don't use condoms every time.  You or a partner was diagnosed with a sexually transmitted infection.  If you are at risk for HIV, ask about PrEP medicine to prevent HIV.  Get tested for HIV at least once in your life, whether you are at risk for HIV or not.  Cancer screening tests  Cervical cancer screening: If you have a cervix, begin getting regular cervical cancer screening tests starting at age 21.  Breast cancer scan (mammogram): If you've ever had breasts, begin having regular mammograms starting at age 40. This is a scan to check for breast cancer.  Colon cancer screening: It is important to start screening for colon cancer at age 45.  Have a colonoscopy test every 10 years (or more often if you're at risk) Or, ask your provider about stool tests like a FIT test every year or Cologuard test every 3 years.  To learn more about your testing options, visit:   .  For help making a decision, visit:   https://bit.ly/ap18245.  Prostate cancer screening test: If you have a prostate, ask your care team if a prostate cancer screening test (PSA) at age 55 is right for you.  Lung cancer screening: If you are a current or former smoker ages 50 to 80, ask your care team if ongoing lung cancer screenings are right for you.  For informational purposes only. Not to replace the advice of your health care provider. Copyright   2023 [employer]. All rights reserved. Clinically reviewed by the [facility] Transitions Program. [employer] 727166 - REV 01/24.

## 2025-07-08 NOTE — PROGRESS NOTES
"Preventive Care Visit  [facility]  Yuri Masterson MD, Family Medicine  Jul 8, 2025      Assessment & Plan       Assessment and Plan    (Z00.00) Encounter for Medicare annual wellness exam  (primary encounter diagnosis)  Comment:   Plan: COMPREHENSIVE METABOLIC PANEL            (R73.03) Prediabetes  Comment: monitoring, has been stable for many years  Plan: HEMOGLOBIN A1C              (E78.5) Hyperlipidemia LDL goal <160  Comment: annual labs, refill meds  Plan: Lipid panel reflex to direct LDL Non-fasting,         rosuvastatin (CRESTOR) 40 MG tablet              RTC in 1y    Yuri Masterson MD   The longitudinal plan of care for the diagnosis(es)/condition(s) as documented were addressed during this visit. Due to the added complexity in care, I will continue to support patient in the subsequent management and with ongoing continuity of care.      BMI  Estimated body mass index is 25.24 kg/m  as calculated from the following:    Height as of this encounter: 1.803 m (5' 11\").    Weight as of this encounter: 82.1 kg (181 lb).       Counseling  Appropriate preventive services were addressed with this patient via screening, questionnaire, or discussion as appropriate for fall prevention, nutrition, physical activity, Tobacco-use cessation, social engagement, weight loss and cognition.  Checklist reviewing preventive services available has been given to the patient.  Reviewed patient's diet, addressing concerns and/or questions.   Reviewed preventive health counseling, as reflected in patient instructions       Regular exercise       Healthy diet/nutrition       Vision screening        Subjective   Yogi is a 64 year old, presenting for the following:  Medicare Visit        7/8/2025     2:32 PM   Additional Questions   Roomed by MR   Accompanied by Self         7/8/2025     2:32 PM   Patient Reported Additional Medications   Patient reports taking the following new medications NA    "       HPI    Notes  Cancer - prostate cancer appears to have returned.  PSA creeping up, onc uro is concerned about reoccurance.      Otherwise is feeling great and has no specific concerns.      Advance Care Planning    Discussed advance care planning with patient; however, patient declined at this time.        7/3/2025   General Health   How would you rate your overall physical health? Good    Feel stress (tense, anxious, or unable to sleep) Patient declined        Proxy-reported         7/3/2025   Nutrition   Diet: Regular (no restrictions)        Proxy-reported         7/3/2025   Exercise   Days per week of moderate/strenous exercise Patient declined    Average minutes spent exercising at this level Patient declined        Proxy-reported         7/3/2025   Social Factors   Frequency of gathering with friends or relatives Patient declined    Worry food won't last until get money to buy more No    Food not last or not have enough money for food? No    Do you have housing? (Housing is defined as stable permanent housing and does not include staying outside in a car, in a tent, in an abandoned building, in an overnight shelter, or couch-surfing.) Yes    Are you worried about losing your housing? No    Lack of transportation? No    Unable to get utilities (heat,electricity)? No        Proxy-reported         7/3/2025   Fall Risk   Fallen 2 or more times in the past year? No    Trouble with walking or balance? No        Proxy-reported          7/3/2025   Activities of Daily Living- Home Safety   Needs help with the following daily activites None of the above    Safety concerns in the home None of the above        Proxy-reported         7/3/2025   Dental   Dentist two times every year? Yes        Proxy-reported         7/3/2025   Hearing Screening   Hearing concerns? None of the above        Proxy-reported         7/3/2025   Driving Risk Screening   Patient/family members have concerns about driving No         Proxy-reported         7/3/2025   General Alertness/Fatigue Screening   Have you been more tired than usual lately? (!) DECLINE        Proxy-reported         7/3/2025   Urinary Incontinence Screening   Bothered by leaking urine in past 6 months No        Proxy-reported         Today's PHQ-2 Score:       7/7/2025     4:09 PM   PHQ-2 ( 1999 Pf[employer])   Q1: Little interest or pleasure in doing things 0    Q2: Feeling down, depressed or hopeless 0    PHQ-2 Score 0    Q1: Little interest or pleasure in doing things Not at all    Q2: Feeling down, depressed or hopeless Not at all    PHQ-2 Score 0        Proxy-reported           7/3/2025   Substance Use   Alcohol more than 3/day or more than 7/wk No    Do you have a current opioid prescription? No    How severe/bad is pain from 1 to 10? 0/10 (No Pain)    Do you use any other substances recreationally? No     (!) DECLINE        Proxy-reported    Multiple values from one day are sorted in reverse-chronological order     Social History     Tobacco Use    Smoking status: Never    Smokeless tobacco: Never   Vaping Use    Vaping status: Never Used   Substance Use Topics    Alcohol use: Yes     Comment: 7-10 week    Drug use: No           7/3/2025   AAA Screening   Family history of Abdominal Aortic Aneurysm (AAA)? No        Proxy-reported   Last PSA:   PSA   Date Value Ref Range Status   02/18/2021 0.04 0 - 4 ug/L Final     Comment:     Assay Method:  Chemiluminescence using Siemens Vista analyzer     PSA Tumor Marker   Date Value Ref Range Status   06/11/2025 0.08 0.00 - 4.50 ng/mL Final   07/19/2022 0.07 0.00 - 4.00 ug/L Final     ASCVD Risk   The 10-year ASCVD risk score (Leslie BARNARD, et al., 2019) is: 11%    Values used to calculate the score:      Age: 64 years      Sex: Male      Is Non- : No      Diabetic: No      Tobacco smoker: No      Systolic Blood Pressure: 121 mmHg      Is BP treated: No      HDL Cholesterol: 41 mg/dL      Total  "Cholesterol: 140 mg/dL            Reviewed and updated as needed this visit by Provider                    Current providers sharing in care for this patient include:  Patient Care Team:  Kiel Torrse MD (Inactive) as PCP - General (Family Medicine)  Fercho Serra MD as Assigned Surgical Provider  Fercho Serra MD as MD (Urology)  Avinash Whitman MD as MD (Dermatology)  Yuri Masterson MD as Assigned PCP  Avinash Whitman MD as Assigned Dermatology Provider  Fercho Serra MD as MD (Urology)    The following health maintenance items are reviewed in Epic and correct as of today:  Health Maintenance   Topic Date Due    COVID-19 VACCINE (8 - 2024-25 season) 04/07/2025    MEDICARE ANNUAL WELLNESS VISIT  05/07/2025    ANNUAL REVIEW OF HM ORDERS  05/07/2025    A1C  06/19/2025    CMP  06/19/2025    LIPID  06/19/2025    PSA  12/11/2025    FALL RISK ASSESSMENT  07/08/2026    COLORECTAL CANCER SCREENING  03/22/2028    DTAP/TDAP/TD VACCINE (2 - Td or Tdap) 02/12/2029    ADVANCE CARE PLANNING  07/08/2030    RSV VACCINE (1 - 1-dose 75+ series) 07/19/2033    HEPATITIS C SCREENING  Completed    PHQ-2 (once per calendar year)  Completed    PNEUMOCOCCAL VACCINE 50+ YEARS  Completed    ZOSTER VACCINE  Completed    HPV VACCINE  Aged Out    MENINGITIS VACCINE  Aged Out    INFLUENZA VACCINE  Discontinued    DIABETES SCREENING  Discontinued        Objective    Exam  /73   Pulse 71   Temp 98.5  F (36.9  C) (Skin)   Resp 15   Ht 1.803 m (5' 11\")   Wt 82.1 kg (181 lb)   SpO2 97%   BMI 25.24 kg/m     Estimated body mass index is 25.24 kg/m  as calculated from the following:    Height as of this encounter: 1.803 m (5' 11\").    Weight as of this encounter: 82.1 kg (181 lb).    Physical Exam  Vitals and nursing note reviewed.   Constitutional:       Appearance: He is well-developed.   HENT:      Head: Normocephalic and atraumatic.      Right Ear: Tympanic membrane, ear canal and external ear normal.    "   Left Ear: Tympanic membrane, ear canal and external ear normal.   Eyes:      Pupils: Pupils are equal, round, and reactive to light.   Neck:      Thyroid: No thyromegaly.   Cardiovascular:      Rate and Rhythm: Normal rate and regular rhythm.      Heart sounds: Normal heart sounds.   Pulmonary:      Effort: Pulmonary effort is normal.      Breath sounds: Normal breath sounds.   Abdominal:      General: Bowel sounds are normal.      Palpations: Abdomen is soft. There is no mass.   Musculoskeletal:         General: Normal range of motion.   Lymphadenopathy:      Cervical: No cervical adenopathy.   Skin:     General: Skin is warm and dry.      Findings: No rash.   Neurological:      Mental Status: He is alert and oriented to person, place, and time.   Psychiatric:         Judgment: Judgment normal.                7/8/2025   Mini Cog   Clock Draw Score 2 Normal   3 Item Recall 3 objects recalled   Mini Cog Total Score 5              Signed Electronically by: Yuri Masterson MD

## 2025-07-09 LAB
ALBUMIN SERPL BCG-MCNC: 4.6 G/DL (ref 3.5–5.2)
ALP SERPL-CCNC: 61 U/L (ref 40–150)
ALT SERPL W P-5'-P-CCNC: 25 U/L (ref 0–70)
ANION GAP SERPL CALCULATED.3IONS-SCNC: 9 MMOL/L (ref 7–15)
AST SERPL W P-5'-P-CCNC: 34 U/L (ref 0–45)
BILIRUB SERPL-MCNC: 0.5 MG/DL
BUN SERPL-MCNC: 24.9 MG/DL (ref 8–23)
CALCIUM SERPL-MCNC: 9.5 MG/DL (ref 8.8–10.4)
CHLORIDE SERPL-SCNC: 105 MMOL/L (ref 98–107)
CHOLEST SERPL-MCNC: 142 MG/DL
CREAT SERPL-MCNC: 0.92 MG/DL (ref 0.67–1.17)
EGFRCR SERPLBLD CKD-EPI 2021: >90 ML/MIN/1.73M2
FASTING STATUS PATIENT QL REPORTED: NO
FASTING STATUS PATIENT QL REPORTED: NO
GLUCOSE SERPL-MCNC: 110 MG/DL (ref 70–99)
HCO3 SERPL-SCNC: 27 MMOL/L (ref 22–29)
HDLC SERPL-MCNC: 39 MG/DL
LDLC SERPL CALC-MCNC: 68 MG/DL
NONHDLC SERPL-MCNC: 103 MG/DL
POTASSIUM SERPL-SCNC: 4.5 MMOL/L (ref 3.4–5.3)
PROT SERPL-MCNC: 6.5 G/DL (ref 6.4–8.3)
SODIUM SERPL-SCNC: 141 MMOL/L (ref 135–145)
TRIGL SERPL-MCNC: 176 MG/DL

## 2025-08-01 NOTE — ASSESSMENT & PLAN NOTE
SURGEON:  Dr. Darin Alfredo    Preoperative Diagnosis:  Nonfunctioning ventilation tube(s)    Postoperative Diagnosis:  Same    Procedure:  Removal of bilateral retained ear tube with left tympanoplasty    Findings: Retained PET bilateral ears    Anesthesia:  Mask    Blood loss:  None    Medications administered in OR:  None    Indications for procedure:   Patient present to ENT clinic with complaints of recurrent acute otitis media.  Risks and benefits of tube removal and tympanoplasty were extensively discussed with the child's guardians, and they elected to proceed with the procedure.    Procedure in detail:  After appropriate consents were obtained, the patient was taken to the Operating Room and placed on the operating table in a supine position.  After anesthesia achieved an adequate level of mask anesthetic, the binocular operating microscope was brought into the field.    Her right EAC was found to have a moderate amount of cerumen that was carefully cleaned with a curette.  The tympanic membrane was then visualized, and was found to have a retained PET in place. Using a pick and alligator forceps we were able to remove the PET which was adherent to the TM.  The underlying TM was intact.  A cotton ball was then placed in the EAC, and attention was then turned to the left ear.    Her left EAC was found to have a moderate amount of cerumen that was carefully cleaned with a curette.  The tympanic membrane was then visualized, and was found to have a retained PET in place. Using a pick, the tube was gently loosened from the surrounding TM.  An empty alligator was then used to remove the tube from the TM.  A pick was then used to free a small rim of tissue surrounding the residual perforation, and that tissue was then removed with an alligator. The ear was then irrigated with normal saline.  A small piece of gel foam soaked in ofloxacin was then placed in a dumbbell shaped fashion into the residual perforation.  A  Urology will seem him shortly   cotton ball was then placed in the EAC, and attention was then turned to the left ear.    The patient was then handed over to Anesthesia, at which time he was awakened without difficulty and brought to the recovery room in good condition.

## 2025-08-25 DIAGNOSIS — N52.31 ERECTILE DYSFUNCTION AFTER RADICAL PROSTATECTOMY: ICD-10-CM

## 2025-08-25 RX ORDER — SILDENAFIL CITRATE 20 MG/1
TABLET ORAL
Qty: 170 TABLET | Refills: 2 | Status: SHIPPED | OUTPATIENT
Start: 2025-08-25

## (undated) DEVICE — SU VICRYL 2-0 CT-2 27" J333H

## (undated) DEVICE — ESU GROUND PAD ADULT W/CORD E7507

## (undated) DEVICE — DRAPE LAP W/ARMBOARD 29410

## (undated) DEVICE — SU MONOCRYL 4-0 PS-2 18" UND Y496G

## (undated) DEVICE — SU MONOCRYL 2-0 UR-6 27" Y605H

## (undated) DEVICE — Device

## (undated) DEVICE — DRSG ABDOMINAL 07 1/2X8" 7197D

## (undated) DEVICE — LINEN TOWEL PACK X10 5473

## (undated) DEVICE — LINEN HALF SHEET 5512

## (undated) DEVICE — SU VICRYL 3-0 TIE 12X18" J904T

## (undated) DEVICE — MANIFOLD NEPTUNE 4 PORT 700-20

## (undated) DEVICE — BLADE KNIFE SURG 10 371110

## (undated) DEVICE — SOL WATER IRRIG 1000ML BOTTLE 2F7114

## (undated) DEVICE — LINEN FULL SHEET 5511

## (undated) DEVICE — DRSG TELFA 3X8" 1238

## (undated) DEVICE — DRAPE LAP PEDS DISP 29492

## (undated) DEVICE — PACK MINOR CUSTOM RIDGES SBA32RMRMA

## (undated) DEVICE — DRAPE LAP W/POUCH 9430

## (undated) DEVICE — GLOVE PROTEXIS POWDER FREE SMT 7.5  2D72PT75X

## (undated) DEVICE — DRAIN PENROSE 0.50"X18" LATEX FREE GR203

## (undated) DEVICE — PREP POVIDONE IODINE SOLUTION 10% 120ML

## (undated) DEVICE — SPONGE LAP 18X18" 1515

## (undated) DEVICE — SU PDS II 2-0 CT-2 27"  Z333H

## (undated) DEVICE — SU CHROMIC 2-0 UR-6 27" N878H

## (undated) DEVICE — PREP SKIN SCRUB TRAY 4461A

## (undated) DEVICE — PREP CHLORAPREP 26ML TINTED ORANGE  260815

## (undated) DEVICE — DECANTER BAG 2002S

## (undated) DEVICE — CATH HOLDER STRAP 36600

## (undated) DEVICE — ESU ELEC BLADE 6" COATED E1450-6

## (undated) DEVICE — BLADE KNIFE SURG 15 371115

## (undated) DEVICE — SU DERMABOND MINI DHVM12

## (undated) DEVICE — GLOVE PROTEXIS BLUE W/NEU-THERA 6.0  2D73EB60

## (undated) DEVICE — SU VICRYL 3-0 SH 27" UND J416H

## (undated) DEVICE — ADH SKIN CLOSURE PREMIERPRO EXOFIN MICOR HV 0.5ML 3471

## (undated) DEVICE — BLADE CLIPPER 3M 9670

## (undated) DEVICE — CATH FOLEY 20FR 5ML LUBRICATH LATEX 0165L20

## (undated) DEVICE — CLIP ETHICON LIGACLIP LG YELLOW LT400

## (undated) DEVICE — DRAIN JACKSON PRATT RESERVOIR 100ML SU130-1305

## (undated) DEVICE — CLIP APPLIER 11.5" PREMIUM II 134053

## (undated) DEVICE — PREP POVIDONE IODINE SCRUB 7.5% 120ML

## (undated) DEVICE — ESU ELEC BLADE 2.75" COATED/INSULATED E1455

## (undated) DEVICE — SU CHROMIC 2-0 UR-5 27" U245H

## (undated) DEVICE — PACK MAJOR SBA15MAFSI

## (undated) DEVICE — SYR 50ML CATH TIP W/O NDL 309620

## (undated) DEVICE — SUCTION CANISTER STRAW 65652-008

## (undated) DEVICE — DRSG STERI STRIP 1/2X4" R1547

## (undated) DEVICE — PAD CHUX UNDERPAD 30X36" P3036C

## (undated) DEVICE — BAG CLEAR TRASH 1.3M 39X33" P4040C

## (undated) DEVICE — NDL 22GA 1.5"

## (undated) DEVICE — SU PLAIN 3-0 CT 27" 852H

## (undated) DEVICE — LINEN DRAPE 54X72" 5467

## (undated) DEVICE — PREP DURAPREP 26ML APL 8630

## (undated) DEVICE — SU VICRYL 2-0 UR-5 27" J375H

## (undated) DEVICE — KIT ENDO TURNOVER/PROCEDURE W/CLEAN A SCOPE LINERS 103888

## (undated) DEVICE — SOL NACL 0.9% IRRIG 1000ML BOTTLE 2F7124

## (undated) DEVICE — TUBING SUCTION 12"X1/4" N612

## (undated) DEVICE — SUCTION CANISTER MEDIVAC LINER 3000ML W/LID 65651-530

## (undated) DEVICE — SU SILK 2-0 PSL 18" 673H

## (undated) DEVICE — DEVICE CATH STABILIZATION STATLOCK FOLEY 2-WAY FOL0102

## (undated) DEVICE — SU VICRYL 0 REEL 54" J207G

## (undated) DEVICE — SPONGE KITTNER 30-101

## (undated) DEVICE — DRAIN JACKSON PRATT 15FR ROUND SU130-1323

## (undated) DEVICE — SPONGE LAP 4X18" X8415

## (undated) DEVICE — DRAPE IOBAN INCISE 23X17" 6650EZ

## (undated) DEVICE — SU VICRYL 0 CT-1 36" J346H

## (undated) DEVICE — PITCHER STERILE 1000ML  SSK9004A

## (undated) DEVICE — DRSG GAUZE 4X4" TRAY

## (undated) DEVICE — ESU HOLSTER PLASTIC DISP E2400

## (undated) DEVICE — SPONGE BALL KERLIX ROUND XL W/O STRING LATEX 4935

## (undated) DEVICE — SYR 10ML SLIP TIP W/O NDL 303134

## (undated) DEVICE — GLOVE PROTEXIS POWDER FREE 8.0 ORTHOPEDIC 2D73ET80

## (undated) DEVICE — SU CHROMIC 4-0 RB-1 27" U203H

## (undated) RX ORDER — PHENYLEPHRINE HCL IN 0.9% NACL 1 MG/10 ML
SYRINGE (ML) INTRAVENOUS
Status: DISPENSED
Start: 2020-07-14

## (undated) RX ORDER — CEFAZOLIN SODIUM 2 G/100ML
INJECTION, SOLUTION INTRAVENOUS
Status: DISPENSED
Start: 2017-12-15

## (undated) RX ORDER — DEXAMETHASONE SODIUM PHOSPHATE 4 MG/ML
INJECTION, SOLUTION INTRA-ARTICULAR; INTRALESIONAL; INTRAMUSCULAR; INTRAVENOUS; SOFT TISSUE
Status: DISPENSED
Start: 2017-07-11

## (undated) RX ORDER — BUPIVACAINE HYDROCHLORIDE 5 MG/ML
INJECTION, SOLUTION EPIDURAL; INTRACAUDAL
Status: DISPENSED
Start: 2017-07-11

## (undated) RX ORDER — LIDOCAINE HYDROCHLORIDE 10 MG/ML
INJECTION, SOLUTION EPIDURAL; INFILTRATION; INTRACAUDAL; PERINEURAL
Status: DISPENSED
Start: 2017-07-11

## (undated) RX ORDER — BUPIVACAINE HYDROCHLORIDE 5 MG/ML
INJECTION, SOLUTION EPIDURAL; INTRACAUDAL
Status: DISPENSED
Start: 2017-12-15

## (undated) RX ORDER — FENTANYL CITRATE 50 UG/ML
INJECTION, SOLUTION INTRAMUSCULAR; INTRAVENOUS
Status: DISPENSED
Start: 2017-07-11

## (undated) RX ORDER — CEFAZOLIN SODIUM 2 G/100ML
INJECTION, SOLUTION INTRAVENOUS
Status: DISPENSED
Start: 2020-07-14

## (undated) RX ORDER — NEOSTIGMINE METHYLSULFATE 1 MG/ML
VIAL (ML) INJECTION
Status: DISPENSED
Start: 2020-07-14

## (undated) RX ORDER — GLYCOPYRROLATE 0.2 MG/ML
INJECTION INTRAMUSCULAR; INTRAVENOUS
Status: DISPENSED
Start: 2017-07-11

## (undated) RX ORDER — PHENYLEPHRINE HCL IN 0.9% NACL 1 MG/10 ML
SYRINGE (ML) INTRAVENOUS
Status: DISPENSED
Start: 2017-07-11

## (undated) RX ORDER — NEOSTIGMINE METHYLSULFATE 5 MG/5 ML
SYRINGE (ML) INTRAVENOUS
Status: DISPENSED
Start: 2017-07-11

## (undated) RX ORDER — OXYCODONE HYDROCHLORIDE 5 MG/1
TABLET ORAL
Status: DISPENSED
Start: 2017-12-15

## (undated) RX ORDER — LIDOCAINE HYDROCHLORIDE 10 MG/ML
INJECTION, SOLUTION EPIDURAL; INFILTRATION; INTRACAUDAL; PERINEURAL
Status: DISPENSED
Start: 2020-07-14

## (undated) RX ORDER — PROPOFOL 10 MG/ML
INJECTION, EMULSION INTRAVENOUS
Status: DISPENSED
Start: 2017-07-11

## (undated) RX ORDER — FENTANYL CITRATE 50 UG/ML
INJECTION, SOLUTION INTRAMUSCULAR; INTRAVENOUS
Status: DISPENSED
Start: 2018-03-22

## (undated) RX ORDER — GLYCOPYRROLATE 0.2 MG/ML
INJECTION INTRAMUSCULAR; INTRAVENOUS
Status: DISPENSED
Start: 2020-07-14

## (undated) RX ORDER — ONDANSETRON 2 MG/ML
INJECTION INTRAMUSCULAR; INTRAVENOUS
Status: DISPENSED
Start: 2020-07-14

## (undated) RX ORDER — FENTANYL CITRATE 50 UG/ML
INJECTION, SOLUTION INTRAMUSCULAR; INTRAVENOUS
Status: DISPENSED
Start: 2020-07-14

## (undated) RX ORDER — ONDANSETRON 2 MG/ML
INJECTION INTRAMUSCULAR; INTRAVENOUS
Status: DISPENSED
Start: 2017-07-11

## (undated) RX ORDER — CEFAZOLIN SODIUM 1 G/3ML
INJECTION, POWDER, FOR SOLUTION INTRAMUSCULAR; INTRAVENOUS
Status: DISPENSED
Start: 2017-07-11

## (undated) RX ORDER — DEXAMETHASONE SODIUM PHOSPHATE 4 MG/ML
INJECTION, SOLUTION INTRA-ARTICULAR; INTRALESIONAL; INTRAMUSCULAR; INTRAVENOUS; SOFT TISSUE
Status: DISPENSED
Start: 2020-07-14

## (undated) RX ORDER — BUPIVACAINE HYDROCHLORIDE 5 MG/ML
INJECTION, SOLUTION EPIDURAL; INTRACAUDAL
Status: DISPENSED
Start: 2020-07-14

## (undated) RX ORDER — PROPOFOL 10 MG/ML
INJECTION, EMULSION INTRAVENOUS
Status: DISPENSED
Start: 2020-07-14

## (undated) RX ORDER — OXYCODONE HYDROCHLORIDE 5 MG/1
TABLET ORAL
Status: DISPENSED
Start: 2020-07-14